# Patient Record
Sex: MALE | Race: BLACK OR AFRICAN AMERICAN | Employment: OTHER | URBAN - METROPOLITAN AREA
[De-identification: names, ages, dates, MRNs, and addresses within clinical notes are randomized per-mention and may not be internally consistent; named-entity substitution may affect disease eponyms.]

---

## 2018-10-09 ENCOUNTER — HOSPITAL ENCOUNTER (OUTPATIENT)
Dept: LAB | Age: 58
Discharge: HOME | End: 2018-10-09
Payer: MEDICARE

## 2018-10-09 ENCOUNTER — HOSPITAL ENCOUNTER (OUTPATIENT)
Dept: NON INVASIVE DIAGNOSTICS | Age: 58
Discharge: HOME | End: 2018-10-09
Payer: MEDICARE

## 2018-10-09 DIAGNOSIS — M20.12 ACQUIRED HALLUX VALGUS OF LEFT FOOT: ICD-10-CM

## 2018-10-09 LAB
ATRIAL RATE: 66 BPM
CALCULATED P AXIS, ECG09: 76 DEGREES
CALCULATED R AXIS, ECG10: 74 DEGREES
CALCULATED T AXIS, ECG11: 65 DEGREES
DIAGNOSIS, 93000: NORMAL
HCT VFR BLD AUTO: 43.6 % (ref 36–48)
HGB BLD-MCNC: 14.1 G/DL (ref 13–16)
P-R INTERVAL, ECG05: 154 MS
POTASSIUM SERPL-SCNC: 4.7 MMOL/L (ref 3.5–5.5)
Q-T INTERVAL, ECG07: 368 MS
QRS DURATION, ECG06: 88 MS
QTC CALCULATION (BEZET), ECG08: 385 MS
VENTRICULAR RATE, ECG03: 66 BPM

## 2018-10-09 PROCEDURE — 93005 ELECTROCARDIOGRAM TRACING: CPT

## 2018-10-09 PROCEDURE — 36415 COLL VENOUS BLD VENIPUNCTURE: CPT | Performed by: PODIATRIST

## 2018-10-09 PROCEDURE — 85018 HEMOGLOBIN: CPT | Performed by: PODIATRIST

## 2018-10-09 PROCEDURE — 84132 ASSAY OF SERUM POTASSIUM: CPT | Performed by: PODIATRIST

## 2018-10-10 LAB
FAX TO INFO,FAXT: NORMAL
FAX TO NUMBER,FAXN: NORMAL

## 2018-10-23 RX ORDER — TRIAMCINOLONE ACETONIDE 1 MG/G
CREAM TOPICAL
COMMUNITY

## 2018-10-23 RX ORDER — OMEPRAZOLE 20 MG/1
20 CAPSULE, DELAYED RELEASE ORAL
Status: ON HOLD | COMMUNITY
End: 2019-02-04

## 2018-10-23 RX ORDER — FAMOTIDINE 20 MG/1
20 TABLET, FILM COATED ORAL DAILY
COMMUNITY

## 2018-10-23 RX ORDER — COLCHICINE 0.6 MG/1
0.6 TABLET ORAL DAILY
COMMUNITY

## 2018-10-29 ENCOUNTER — HOSPITAL ENCOUNTER (OUTPATIENT)
Dept: INTERVENTIONAL RADIOLOGY/VASCULAR | Age: 58
Discharge: HOME | End: 2018-10-29
Attending: PODIATRIST
Payer: MEDICARE

## 2018-10-29 DIAGNOSIS — M79.675 PAIN OF GREAT TOE, LEFT: ICD-10-CM

## 2018-10-29 LAB
ANION GAP SERPL CALC-SCNC: 12 MMOL/L (ref 3–18)
APTT PPP: 25.9 SEC (ref 23–36.4)
BASOPHILS # BLD: 0 K/UL (ref 0–0.1)
BASOPHILS NFR BLD: 0 % (ref 0–3)
BUN SERPL-MCNC: 11 MG/DL (ref 7–18)
BUN/CREAT SERPL: 11
CALCIUM SERPL-MCNC: 8.9 MG/DL (ref 8.5–10.1)
CHLORIDE SERPL-SCNC: 100 MMOL/L (ref 100–108)
CO2 SERPL-SCNC: 28 MMOL/L (ref 21–32)
CREAT SERPL-MCNC: 0.96 MG/DL (ref 0.6–1.3)
DIFFERENTIAL METHOD BLD: ABNORMAL
EOSINOPHIL # BLD: 0.2 K/UL (ref 0–0.4)
EOSINOPHIL NFR BLD: 2 % (ref 0–5)
ERYTHROCYTE [DISTWIDTH] IN BLOOD BY AUTOMATED COUNT: 13 % (ref 11.6–14.5)
GLUCOSE SERPL-MCNC: 80 MG/DL (ref 74–99)
HCT VFR BLD AUTO: 41.7 % (ref 36–48)
HGB BLD-MCNC: 13.1 G/DL (ref 13–16)
INR PPP: 1 (ref 0.8–1.2)
LYMPHOCYTES # BLD: 1.2 K/UL (ref 0.8–3.5)
LYMPHOCYTES NFR BLD: 10 % (ref 20–51)
MCH RBC QN AUTO: 27.6 PG (ref 24–34)
MCHC RBC AUTO-ENTMCNC: 31.4 G/DL (ref 31–37)
MCV RBC AUTO: 87.8 FL (ref 74–97)
MONOCYTES # BLD: 0.7 K/UL (ref 0–1)
MONOCYTES NFR BLD: 6 % (ref 2–9)
NEUTS BAND NFR BLD MANUAL: 9 % (ref 0–5)
NEUTS SEG # BLD: 8.7 K/UL (ref 1.8–8)
NEUTS SEG NFR BLD: 73 % (ref 42–75)
PLATELET # BLD AUTO: 353 K/UL (ref 135–420)
PLATELET COMMENTS,PCOM: ABNORMAL
PMV BLD AUTO: 9.5 FL (ref 9.2–11.8)
POTASSIUM SERPL-SCNC: 4.7 MMOL/L (ref 3.5–5.5)
PROTHROMBIN TIME: 12.4 SEC (ref 11.5–15.2)
RBC # BLD AUTO: 4.75 M/UL (ref 4.7–5.5)
RBC MORPH BLD: ABNORMAL
SODIUM SERPL-SCNC: 140 MMOL/L (ref 136–145)
WBC # BLD AUTO: 11.9 K/UL (ref 4.6–13.2)

## 2018-10-29 PROCEDURE — 85730 THROMBOPLASTIN TIME PARTIAL: CPT | Performed by: PODIATRIST

## 2018-10-29 PROCEDURE — 85610 PROTHROMBIN TIME: CPT | Performed by: PODIATRIST

## 2018-10-29 PROCEDURE — 36415 COLL VENOUS BLD VENIPUNCTURE: CPT | Performed by: PODIATRIST

## 2018-10-29 PROCEDURE — 74011250636 HC RX REV CODE- 250/636: Performed by: PODIATRIST

## 2018-10-29 PROCEDURE — C1751 CATH, INF, PER/CENT/MIDLINE: HCPCS

## 2018-10-29 PROCEDURE — 85025 COMPLETE CBC W/AUTO DIFF WBC: CPT | Performed by: PODIATRIST

## 2018-10-29 PROCEDURE — 80048 BASIC METABOLIC PNL TOTAL CA: CPT | Performed by: PODIATRIST

## 2018-10-29 RX ORDER — LIDOCAINE HYDROCHLORIDE 10 MG/ML
1-10 INJECTION, SOLUTION EPIDURAL; INFILTRATION; INTRACAUDAL; PERINEURAL
Status: COMPLETED | OUTPATIENT
Start: 2018-10-29 | End: 2018-10-29

## 2018-10-29 RX ORDER — HEPARIN SODIUM 200 [USP'U]/100ML
500 INJECTION, SOLUTION INTRAVENOUS
Status: COMPLETED | OUTPATIENT
Start: 2018-10-29 | End: 2018-10-29

## 2018-10-29 RX ORDER — HEPARIN SODIUM 200 [USP'U]/100ML
INJECTION, SOLUTION INTRAVENOUS
Status: DISPENSED
Start: 2018-10-29 | End: 2018-10-29

## 2018-10-29 RX ORDER — LIDOCAINE HYDROCHLORIDE 10 MG/ML
INJECTION, SOLUTION EPIDURAL; INFILTRATION; INTRACAUDAL; PERINEURAL
Status: DISPENSED
Start: 2018-10-29 | End: 2018-10-29

## 2018-10-29 RX ORDER — HEPARIN SODIUM (PORCINE) LOCK FLUSH IV SOLN 100 UNIT/ML 100 UNIT/ML
SOLUTION INTRAVENOUS
Status: DISPENSED
Start: 2018-10-29 | End: 2018-10-29

## 2018-10-29 RX ORDER — HEPARIN SODIUM (PORCINE) LOCK FLUSH IV SOLN 100 UNIT/ML 100 UNIT/ML
500 SOLUTION INTRAVENOUS
Status: COMPLETED | OUTPATIENT
Start: 2018-10-29 | End: 2018-10-29

## 2018-10-29 RX ADMIN — HEPARIN SODIUM 1000 UNITS: 200 INJECTION, SOLUTION INTRAVENOUS at 08:45

## 2018-10-29 RX ADMIN — HEPARIN SODIUM (PORCINE) LOCK FLUSH IV SOLN 100 UNIT/ML 500 UNITS: 100 SOLUTION at 08:48

## 2018-10-29 RX ADMIN — LIDOCAINE HYDROCHLORIDE 3 ML: 10 INJECTION, SOLUTION EPIDURAL; INFILTRATION; INTRACAUDAL; PERINEURAL at 08:45

## 2019-02-03 ENCOUNTER — HOSPITAL ENCOUNTER (EMERGENCY)
Age: 59
Discharge: HOME | DRG: 477 | End: 2019-02-03
Attending: EMERGENCY MEDICINE
Payer: MEDICARE

## 2019-02-03 VITALS
WEIGHT: 110 LBS | OXYGEN SATURATION: 100 % | TEMPERATURE: 98.9 F | HEART RATE: 115 BPM | RESPIRATION RATE: 18 BRPM | HEIGHT: 64 IN | SYSTOLIC BLOOD PRESSURE: 147 MMHG | DIASTOLIC BLOOD PRESSURE: 101 MMHG | BODY MASS INDEX: 18.78 KG/M2

## 2019-02-03 DIAGNOSIS — M79.675 PAIN OF TOE OF LEFT FOOT: Primary | ICD-10-CM

## 2019-02-03 PROCEDURE — 99283 EMERGENCY DEPT VISIT LOW MDM: CPT

## 2019-02-03 PROCEDURE — 74011250637 HC RX REV CODE- 250/637: Performed by: EMERGENCY MEDICINE

## 2019-02-03 RX ORDER — HYDROCODONE BITARTRATE AND ACETAMINOPHEN 5; 325 MG/1; MG/1
1 TABLET ORAL
Status: COMPLETED | OUTPATIENT
Start: 2019-02-03 | End: 2019-02-03

## 2019-02-03 RX ADMIN — HYDROCODONE BITARTRATE AND ACETAMINOPHEN 1 TABLET: 5; 325 TABLET ORAL at 15:48
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
## 2019-02-03 NOTE — DISCHARGE INSTRUCTIONS
Foot Pain: Care Instructions  Your Care Instructions  Foot injuries that cause pain and swelling are fairly common. Almost all sports or home repair projects can cause a misstep that ends up as foot pain. Normal wear and tear, especially as you get older, also can cause foot pain. Most minor foot injuries will heal on their own, and home treatment is usually all you need to do. If you have a severe injury, you may need tests and treatment. Follow-up care is a key part of your treatment and safety. Be sure to make and go to all appointments, and call your doctor if you are having problems. It's also a good idea to know your test results and keep a list of the medicines you take. How can you care for yourself at home? · Take pain medicines exactly as directed. ? If the doctor gave you a prescription medicine for pain, take it as prescribed. ? If you are not taking a prescription pain medicine, ask your doctor if you can take an over-the-counter medicine. · Rest and protect your foot. Take a break from any activity that may cause pain. · Put ice or a cold pack on your foot for 10 to 20 minutes at a time. Put a thin cloth between the ice and your skin. · Prop up the sore foot on a pillow when you ice it or anytime you sit or lie down during the next 3 days. Try to keep it above the level of your heart. This will help reduce swelling. · Your doctor may recommend that you wrap your foot with an elastic bandage. Keep your foot wrapped for as long as your doctor advises. · If your doctor recommends crutches, use them as directed. · Wear roomy footwear. · As soon as pain and swelling end, begin gentle exercises of your foot. Your doctor can tell you which exercises will help. When should you call for help? Call 911 anytime you think you may need emergency care.  For example, call if:    · Your foot turns pale, white, blue, or cold.    Call your doctor now or seek immediate medical care if:    · You cannot move or stand on your foot.     · Your foot looks twisted or out of its normal position.     · Your foot is not stable when you step down.     · You have signs of infection, such as:  ? Increased pain, swelling, warmth, or redness. ? Red streaks leading from the sore area. ? Pus draining from a place on your foot. ? A fever.     · Your foot is numb or tingly.    Watch closely for changes in your health, and be sure to contact your doctor if:    · You do not get better as expected.     · You have bruises from an injury that last longer than 2 weeks. Where can you learn more? Go to http://neil-yoon.info/. Enter B889 in the search box to learn more about \"Foot Pain: Care Instructions. \"  Current as of: September 20, 2018  Content Version: 11.9  © 9949-1314 [employer]. Care instructions adapted under license by [employer] (which disclaims liability or warranty for this information). If you have questions about a medical condition or this instruction, always ask your healthcare professional. [address] any warranty or liability for your use of this information.
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
## 2019-02-04 ENCOUNTER — HOSPITAL ENCOUNTER (INPATIENT)
Age: 59
LOS: 7 days | Discharge: HOME HEALTH | DRG: 477 | End: 2019-02-11
Attending: HOSPITALIST | Admitting: HOSPITALIST
Payer: MEDICARE

## 2019-02-04 ENCOUNTER — APPOINTMENT (OUTPATIENT)
Dept: INTERVENTIONAL RADIOLOGY/VASCULAR | Age: 59
DRG: 477 | End: 2019-02-04
Attending: HOSPITALIST
Payer: MEDICARE

## 2019-02-04 DIAGNOSIS — Z98.890 STATUS POST SURGERY: Primary | ICD-10-CM

## 2019-02-04 PROBLEM — L03.116 CELLULITIS OF LEFT FOOT: Status: ACTIVE | Noted: 2019-02-04

## 2019-02-04 PROBLEM — Z78.9 ALCOHOL USE: Status: ACTIVE | Noted: 2019-02-04

## 2019-02-04 PROBLEM — F10.20 ALCOHOLISM (HCC): Status: ACTIVE | Noted: 2019-02-04

## 2019-02-04 PROBLEM — E87.1 HYPONATREMIA: Status: ACTIVE | Noted: 2019-02-04

## 2019-02-04 PROBLEM — I10 HYPERTENSION: Status: ACTIVE | Noted: 2019-02-04

## 2019-02-04 PROBLEM — M10.9 GOUT: Status: ACTIVE | Noted: 2019-02-04

## 2019-02-04 PROBLEM — I73.9 PAD (PERIPHERAL ARTERY DISEASE) (HCC): Status: ACTIVE | Noted: 2019-02-04

## 2019-02-04 PROBLEM — F17.200 SMOKER: Status: ACTIVE | Noted: 2019-02-04

## 2019-02-04 LAB
ALBUMIN SERPL-MCNC: 3.6 G/DL (ref 3.4–5)
ALBUMIN/GLOB SERPL: 0.8 {RATIO} (ref 0.8–1.7)
ALP SERPL-CCNC: 171 U/L (ref 45–117)
ALT SERPL-CCNC: 18 U/L (ref 16–61)
AMPHET UR QL SCN: NEGATIVE
ANION GAP SERPL CALC-SCNC: 7 MMOL/L (ref 3–18)
AST SERPL-CCNC: 16 U/L (ref 15–37)
BARBITURATES UR QL SCN: NEGATIVE
BASOPHILS # BLD: 0 K/UL (ref 0–0.1)
BASOPHILS NFR BLD: 0 % (ref 0–2)
BENZODIAZ UR QL: NEGATIVE
BILIRUB SERPL-MCNC: 0.6 MG/DL (ref 0.2–1)
BUN SERPL-MCNC: 10 MG/DL (ref 7–18)
BUN/CREAT SERPL: 13 (ref 12–20)
CALCIUM SERPL-MCNC: 9.1 MG/DL (ref 8.5–10.1)
CANNABINOIDS UR QL SCN: NEGATIVE
CHLORIDE SERPL-SCNC: 98 MMOL/L (ref 100–108)
CO2 SERPL-SCNC: 28 MMOL/L (ref 21–32)
COCAINE UR QL SCN: POSITIVE
CREAT SERPL-MCNC: 0.8 MG/DL (ref 0.6–1.3)
DIFFERENTIAL METHOD BLD: ABNORMAL
EOSINOPHIL # BLD: 0.2 K/UL (ref 0–0.4)
EOSINOPHIL NFR BLD: 1 % (ref 0–5)
ERYTHROCYTE [DISTWIDTH] IN BLOOD BY AUTOMATED COUNT: 12.3 % (ref 11.6–14.5)
GLOBULIN SER CALC-MCNC: 4.4 G/DL (ref 2–4)
GLUCOSE SERPL-MCNC: 103 MG/DL (ref 74–99)
HCT VFR BLD AUTO: 40 % (ref 36–48)
HDSCOM,HDSCOM: ABNORMAL
HGB BLD-MCNC: 12.8 G/DL (ref 13–16)
INR PPP: 0.9 (ref 0.8–1.2)
LYMPHOCYTES # BLD: 0.8 K/UL (ref 0.9–3.6)
LYMPHOCYTES NFR BLD: 6 % (ref 21–52)
MAGNESIUM SERPL-MCNC: 2 MG/DL (ref 1.6–2.6)
MCH RBC QN AUTO: 28.1 PG (ref 24–34)
MCHC RBC AUTO-ENTMCNC: 32 G/DL (ref 31–37)
MCV RBC AUTO: 87.9 FL (ref 74–97)
METHADONE UR QL: NEGATIVE
MONOCYTES # BLD: 2 K/UL (ref 0.05–1.2)
MONOCYTES NFR BLD: 13 % (ref 3–10)
NEUTS SEG # BLD: 12 K/UL (ref 1.8–8)
NEUTS SEG NFR BLD: 80 % (ref 40–73)
OPIATES UR QL: POSITIVE
PCP UR QL: NEGATIVE
PLATELET # BLD AUTO: 351 K/UL (ref 135–420)
PMV BLD AUTO: 9.6 FL (ref 9.2–11.8)
POTASSIUM SERPL-SCNC: 4 MMOL/L (ref 3.5–5.5)
PROT SERPL-MCNC: 8 G/DL (ref 6.4–8.2)
PROTHROMBIN TIME: 12.3 SEC (ref 11.5–15.2)
RBC # BLD AUTO: 4.55 M/UL (ref 4.7–5.5)
SODIUM SERPL-SCNC: 133 MMOL/L (ref 136–145)
WBC # BLD AUTO: 15.1 K/UL (ref 4.6–13.2)

## 2019-02-04 PROCEDURE — 65270000029 HC RM PRIVATE

## 2019-02-04 PROCEDURE — C1751 CATH, INF, PER/CENT/MIDLINE: HCPCS

## 2019-02-04 PROCEDURE — 74011250637 HC RX REV CODE- 250/637: Performed by: HOSPITALIST

## 2019-02-04 PROCEDURE — 83735 ASSAY OF MAGNESIUM: CPT

## 2019-02-04 PROCEDURE — 74011250636 HC RX REV CODE- 250/636

## 2019-02-04 PROCEDURE — 85025 COMPLETE CBC W/AUTO DIFF WBC: CPT

## 2019-02-04 PROCEDURE — 87077 CULTURE AEROBIC IDENTIFY: CPT

## 2019-02-04 PROCEDURE — 36415 COLL VENOUS BLD VENIPUNCTURE: CPT

## 2019-02-04 PROCEDURE — 87186 SC STD MICRODIL/AGAR DIL: CPT

## 2019-02-04 PROCEDURE — 80053 COMPREHEN METABOLIC PANEL: CPT

## 2019-02-04 PROCEDURE — 02HV33Z INSERTION OF INFUSION DEVICE INTO SUPERIOR VENA CAVA, PERCUTANEOUS APPROACH: ICD-10-PCS | Performed by: RADIOLOGY

## 2019-02-04 PROCEDURE — 74011250636 HC RX REV CODE- 250/636: Performed by: HOSPITALIST

## 2019-02-04 PROCEDURE — 80307 DRUG TEST PRSMV CHEM ANLYZR: CPT

## 2019-02-04 PROCEDURE — 85610 PROTHROMBIN TIME: CPT

## 2019-02-04 PROCEDURE — 74011000258 HC RX REV CODE- 258: Performed by: HOSPITALIST

## 2019-02-04 PROCEDURE — 87070 CULTURE OTHR SPECIMN AEROBIC: CPT

## 2019-02-04 RX ORDER — AMLODIPINE BESYLATE 5 MG/1
10 TABLET ORAL DAILY
Status: DISCONTINUED | OUTPATIENT
Start: 2019-02-05 | End: 2019-02-11 | Stop reason: HOSPADM

## 2019-02-04 RX ORDER — HEPARIN SODIUM 5000 [USP'U]/ML
5000 INJECTION, SOLUTION INTRAVENOUS; SUBCUTANEOUS EVERY 8 HOURS
Status: DISCONTINUED | OUTPATIENT
Start: 2019-02-04 | End: 2019-02-11 | Stop reason: HOSPADM

## 2019-02-04 RX ORDER — HYDROCODONE BITARTRATE AND ACETAMINOPHEN 5; 325 MG/1; MG/1
1 TABLET ORAL
Status: DISCONTINUED | OUTPATIENT
Start: 2019-02-04 | End: 2019-02-11

## 2019-02-04 RX ORDER — ACETAMINOPHEN 325 MG/1
650 TABLET ORAL
Status: DISCONTINUED | OUTPATIENT
Start: 2019-02-04 | End: 2019-02-11 | Stop reason: HOSPADM

## 2019-02-04 RX ORDER — TRIAMCINOLONE ACETONIDE 1 MG/G
CREAM TOPICAL 2 TIMES DAILY
Status: DISCONTINUED | OUTPATIENT
Start: 2019-02-04 | End: 2019-02-11 | Stop reason: HOSPADM

## 2019-02-04 RX ORDER — ONDANSETRON 2 MG/ML
4 INJECTION INTRAMUSCULAR; INTRAVENOUS
Status: DISCONTINUED | OUTPATIENT
Start: 2019-02-04 | End: 2019-02-11 | Stop reason: HOSPADM

## 2019-02-04 RX ORDER — ASPIRIN 325 MG/1
100 TABLET, FILM COATED ORAL DAILY
Status: DISCONTINUED | OUTPATIENT
Start: 2019-02-05 | End: 2019-02-11 | Stop reason: HOSPADM

## 2019-02-04 RX ORDER — KETOROLAC TROMETHAMINE 30 MG/ML
15 INJECTION, SOLUTION INTRAMUSCULAR; INTRAVENOUS
Status: DISPENSED | OUTPATIENT
Start: 2019-02-04 | End: 2019-02-09

## 2019-02-04 RX ORDER — MORPHINE SULFATE 2 MG/ML
1 INJECTION, SOLUTION INTRAMUSCULAR; INTRAVENOUS
Status: DISCONTINUED | OUTPATIENT
Start: 2019-02-04 | End: 2019-02-05

## 2019-02-04 RX ORDER — HEPARIN SODIUM 200 [USP'U]/100ML
500 INJECTION, SOLUTION INTRAVENOUS
Status: COMPLETED | OUTPATIENT
Start: 2019-02-04 | End: 2019-02-04

## 2019-02-04 RX ORDER — HEPARIN SODIUM (PORCINE) LOCK FLUSH IV SOLN 100 UNIT/ML 100 UNIT/ML
SOLUTION INTRAVENOUS
Status: COMPLETED
Start: 2019-02-04 | End: 2019-02-04

## 2019-02-04 RX ORDER — SODIUM CHLORIDE 0.9 % (FLUSH) 0.9 %
5-40 SYRINGE (ML) INJECTION EVERY 8 HOURS
Status: DISCONTINUED | OUTPATIENT
Start: 2019-02-04 | End: 2019-02-08

## 2019-02-04 RX ORDER — SODIUM CHLORIDE 0.9 % (FLUSH) 0.9 %
5-40 SYRINGE (ML) INJECTION AS NEEDED
Status: DISCONTINUED | OUTPATIENT
Start: 2019-02-04 | End: 2019-02-11 | Stop reason: HOSPADM

## 2019-02-04 RX ORDER — LISINOPRIL 20 MG/1
40 TABLET ORAL DAILY
Status: DISCONTINUED | OUTPATIENT
Start: 2019-02-04 | End: 2019-02-11 | Stop reason: HOSPADM

## 2019-02-04 RX ORDER — SODIUM CHLORIDE 9 MG/ML
75 INJECTION, SOLUTION INTRAVENOUS CONTINUOUS
Status: DISCONTINUED | OUTPATIENT
Start: 2019-02-04 | End: 2019-02-09

## 2019-02-04 RX ORDER — LIDOCAINE HYDROCHLORIDE 10 MG/ML
INJECTION, SOLUTION EPIDURAL; INFILTRATION; INTRACAUDAL; PERINEURAL
Status: COMPLETED
Start: 2019-02-04 | End: 2019-02-04

## 2019-02-04 RX ORDER — OMEPRAZOLE 20 MG/1
20 CAPSULE, DELAYED RELEASE ORAL DAILY
Status: DISCONTINUED | OUTPATIENT
Start: 2019-02-05 | End: 2019-02-11 | Stop reason: HOSPADM

## 2019-02-04 RX ORDER — FOLIC ACID 1 MG/1
1 TABLET ORAL DAILY
Status: DISCONTINUED | OUTPATIENT
Start: 2019-02-05 | End: 2019-02-11 | Stop reason: HOSPADM

## 2019-02-04 RX ORDER — COLCHICINE 0.6 MG/1
0.6 TABLET ORAL DAILY
Status: DISCONTINUED | OUTPATIENT
Start: 2019-02-05 | End: 2019-02-11 | Stop reason: HOSPADM

## 2019-02-04 RX ORDER — HEPARIN SODIUM (PORCINE) LOCK FLUSH IV SOLN 100 UNIT/ML 100 UNIT/ML
500 SOLUTION INTRAVENOUS
Status: DISCONTINUED | OUTPATIENT
Start: 2019-02-04 | End: 2019-02-11 | Stop reason: HOSPADM

## 2019-02-04 RX ORDER — HEPARIN SODIUM 200 [USP'U]/100ML
INJECTION, SOLUTION INTRAVENOUS
Status: COMPLETED
Start: 2019-02-04 | End: 2019-02-04

## 2019-02-04 RX ORDER — IBUPROFEN 200 MG
1 TABLET ORAL EVERY 24 HOURS
Status: DISCONTINUED | OUTPATIENT
Start: 2019-02-04 | End: 2019-02-11 | Stop reason: HOSPADM

## 2019-02-04 RX ORDER — CLONIDINE HYDROCHLORIDE 0.1 MG/1
0.1 TABLET ORAL
Status: COMPLETED | OUTPATIENT
Start: 2019-02-04 | End: 2019-02-04

## 2019-02-04 RX ORDER — LORAZEPAM 2 MG/ML
3 INJECTION INTRAMUSCULAR
Status: DISCONTINUED | OUTPATIENT
Start: 2019-02-04 | End: 2019-02-11 | Stop reason: HOSPADM

## 2019-02-04 RX ORDER — LORAZEPAM 1 MG/1
1 TABLET ORAL
Status: DISCONTINUED | OUTPATIENT
Start: 2019-02-04 | End: 2019-02-11 | Stop reason: HOSPADM

## 2019-02-04 RX ORDER — LORAZEPAM 2 MG/ML
1 INJECTION INTRAMUSCULAR
Status: DISCONTINUED | OUTPATIENT
Start: 2019-02-04 | End: 2019-02-11 | Stop reason: HOSPADM

## 2019-02-04 RX ORDER — DOCUSATE SODIUM 100 MG/1
100 CAPSULE, LIQUID FILLED ORAL 2 TIMES DAILY
Status: DISCONTINUED | OUTPATIENT
Start: 2019-02-04 | End: 2019-02-11 | Stop reason: HOSPADM

## 2019-02-04 RX ORDER — LIDOCAINE HYDROCHLORIDE 10 MG/ML
1-5 INJECTION, SOLUTION EPIDURAL; INFILTRATION; INTRACAUDAL; PERINEURAL
Status: COMPLETED | OUTPATIENT
Start: 2019-02-04 | End: 2019-02-04

## 2019-02-04 RX ORDER — DIPHENHYDRAMINE HYDROCHLORIDE 50 MG/ML
12.5 INJECTION, SOLUTION INTRAMUSCULAR; INTRAVENOUS
Status: DISCONTINUED | OUTPATIENT
Start: 2019-02-04 | End: 2019-02-11 | Stop reason: HOSPADM

## 2019-02-04 RX ORDER — LORAZEPAM 1 MG/1
2 TABLET ORAL
Status: DISCONTINUED | OUTPATIENT
Start: 2019-02-04 | End: 2019-02-11 | Stop reason: HOSPADM

## 2019-02-04 RX ORDER — LORAZEPAM 2 MG/ML
2 INJECTION INTRAMUSCULAR
Status: DISCONTINUED | OUTPATIENT
Start: 2019-02-04 | End: 2019-02-11 | Stop reason: HOSPADM

## 2019-02-04 RX ORDER — NALOXONE HYDROCHLORIDE 0.4 MG/ML
0.4 INJECTION, SOLUTION INTRAMUSCULAR; INTRAVENOUS; SUBCUTANEOUS AS NEEDED
Status: DISCONTINUED | OUTPATIENT
Start: 2019-02-04 | End: 2019-02-11 | Stop reason: HOSPADM

## 2019-02-04 RX ADMIN — HEPARIN SODIUM 1000 UNITS: 200 INJECTION, SOLUTION INTRAVENOUS at 14:26

## 2019-02-04 RX ADMIN — LIDOCAINE HYDROCHLORIDE 2 ML: 10 INJECTION, SOLUTION EPIDURAL; INFILTRATION; INTRACAUDAL; PERINEURAL at 14:26

## 2019-02-04 RX ADMIN — PIPERACILLIN SODIUM,TAZOBACTAM SODIUM 3.38 G: 3; .375 INJECTION, POWDER, FOR SOLUTION INTRAVENOUS at 17:30

## 2019-02-04 RX ADMIN — MORPHINE SULFATE 1 MG: 2 INJECTION, SOLUTION INTRAMUSCULAR; INTRAVENOUS at 12:30

## 2019-02-04 RX ADMIN — HEPARIN SODIUM (PORCINE) LOCK FLUSH IV SOLN 100 UNIT/ML 500 UNITS: 100 SOLUTION at 14:37

## 2019-02-04 RX ADMIN — HYDROCODONE BITARTRATE AND ACETAMINOPHEN 1 TABLET: 5; 325 TABLET ORAL at 15:05

## 2019-02-04 RX ADMIN — PANCRELIPASE LIPASE, PANCRELIPASE PROTEASE, PANCRELIPASE AMYLASE 1 CAPSULE: 10000; 32000; 42000 CAPSULE, DELAYED RELEASE ORAL at 21:12

## 2019-02-04 RX ADMIN — PIPERACILLIN SODIUM,TAZOBACTAM SODIUM 3.38 G: 3; .375 INJECTION, POWDER, FOR SOLUTION INTRAVENOUS at 12:30

## 2019-02-04 RX ADMIN — HEPARIN SODIUM 5000 UNITS: 5000 INJECTION INTRAVENOUS; SUBCUTANEOUS at 19:54

## 2019-02-04 RX ADMIN — TRIAMCINOLONE ACETONIDE: 1 CREAM TOPICAL at 21:12

## 2019-02-04 RX ADMIN — MORPHINE SULFATE 1 MG: 2 INJECTION, SOLUTION INTRAMUSCULAR; INTRAVENOUS at 17:30

## 2019-02-04 RX ADMIN — CLONIDINE HYDROCHLORIDE 0.1 MG: 0.1 TABLET ORAL at 15:27

## 2019-02-04 RX ADMIN — VANCOMYCIN HYDROCHLORIDE 1000 MG: 10 INJECTION, POWDER, LYOPHILIZED, FOR SOLUTION INTRAVENOUS at 13:12

## 2019-02-04 RX ADMIN — SODIUM CHLORIDE 75 ML/HR: 900 INJECTION, SOLUTION INTRAVENOUS at 13:09

## 2019-02-04 RX ADMIN — LISINOPRIL 40 MG: 20 TABLET ORAL at 13:41

## 2019-02-04 RX ADMIN — SODIUM CHLORIDE 75 ML/HR: 900 INJECTION, SOLUTION INTRAVENOUS at 21:10

## 2019-02-04 RX ADMIN — MORPHINE SULFATE 1 MG: 2 INJECTION, SOLUTION INTRAMUSCULAR; INTRAVENOUS at 21:11

## 2019-02-04 RX ADMIN — KETOROLAC TROMETHAMINE 15 MG: 30 INJECTION, SOLUTION INTRAMUSCULAR at 21:11

## 2019-02-04 NOTE — CONSULTS
Consult Note     This is a 62y.o. year old  Male whois my patient being admitted for failure of implant following great toe joint fusion as well as developing cellulitis and non healing. He has a history of PVD and was cleared for surgery by [facility] vascular. He did well initially but is now starting to deteriorate around the incision. He requires explantation of the hardware and bone biopsy but I also would like to have another vascular eval by Dr Negrita Leone. We have attempted out patient surgery in the past but IV access was too difficult. Subjective:  Past Medical History:   Diagnosis Date    Gout     Hypertension     Pancreatitis chronic      Past Surgical History:   Procedure Laterality Date    HX CHOLECYSTECTOMY      VASCULAR SURGERY PROCEDURE UNLIST      vascular     Social History     Socioeconomic History    Marital status:      Spouse name: Not on file    Number of children: Not on file    Years of education: Not on file    Highest education level: Not on file   Social Needs    Financial resource strain: Not on file    Food insecurity - worry: Not on file    Food insecurity - inability: Not on file   [place] [employer] needs - medical: Not on file   [employer] needs - non-medical: Not on file   Occupational History    Not on file   Tobacco Use    Smoking status: Current Every Day Smoker     Packs/day: 0.50     Years: 20.00     Pack years: 10.00    Smokeless tobacco: Never Used    Tobacco comment: using chantix   Substance and Sexual Activity    Alcohol use:  Yes     Alcohol/week: 1.2 oz     Types: 2 Cans of beer per week    Drug use: No    Sexual activity: Not on file   Other Topics Concern     Service Not Asked    Blood Transfusions Not Asked    Caffeine Concern Not Asked    Occupational Exposure Not Asked    Hobby Hazards Not Asked    Sleep Concern Not Asked    Stress Concern Not Asked    Weight Concern Not Asked    Special Diet Not Asked  Back Care Not Asked    Exercise Not Asked    Bike Helmet Not Asked   [address] Not Asked    Self-Exams Not Asked   Social History Narrative    Not on file     No family history on file.       Objective:  Lower Extremity Physical Exam    Visit Vitals  BP (!) 160/92   Pulse 85   Temp 97.7 °F (36.5 °C)   Resp 16   SpO2 100%       Exam:  Edema and erythema around the great toe fusion site  Skin breakdown and pain  Weak pedal pulses    Assessment:  PVD  Cellulitis of the great toe  Implant failure    Plan  Vascular consult  Explantation of hardware  IV abx    Osito Sanabria DPM  February 4, 2019  5:02 PM

## 2019-02-04 NOTE — PROCEDURES
Vascular & Interventional Radiology Brief Procedure Note    Interventional Radiologist: Jet Fuchs MD    Pre-operative Diagnosis:  Long term venous access     Post-operative Diagnosis: Same as pre-op dx    Procedure(s) Performed:  Ultrasound/fluoroscopic guided PICC line placement    Anesthesia:  Local      Findings:  Successful left arm dual lumen 5F PICC line placement. Ready for immediate use.       Complications: None    Estimated Blood Loss:  minimal    Tubes and Drains: as above    Specimens: None    Condition: Stable       Jet Fuchs MD  [facility]  Vascular & Interventional Radiology  2/4/2019
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
## 2019-02-04 NOTE — PROGRESS NOTES
Problem: Falls - Risk of 
Goal: *Absence of Falls Document [place]by Rank Fall Risk and appropriate interventions in the flowsheet. Outcome: Progressing Towards Goal 
Fall Risk Interventions: 
Mobility Interventions: Assess mobility with egress test 
 
  
 
Medication Interventions: Patient to call before getting OOB Elimination Interventions: Bed/chair exit alarm History of Falls Interventions: Bed/chair exit alarm

## 2019-02-04 NOTE — H&P (VIEW-ONLY)
Consult Note This is a 62y.o. year old  Male whois my patient being admitted for failure of implant following great toe joint fusion as well as developing cellulitis and non healing. He has a history of PVD and was cleared for surgery by [facility] vascular. He did well initially but is now starting to deteriorate around the incision. He requires explantation of the hardware and bone biopsy but I also would like to have another vascular eval by Dr Lauren Bush. We have attempted out patient surgery in the past but IV access was too difficult. Subjective: 
Past Medical History:  
Diagnosis Date  Gout  Hypertension  Pancreatitis chronic Past Surgical History:  
Procedure Laterality Date  HX CHOLECYSTECTOMY  VASCULAR SURGERY PROCEDURE UNLIST    
 vascular Social History Socioeconomic History  Marital status:  Spouse name: Not on file  Number of children: Not on file  Years of education: Not on file  Highest education level: Not on file Social Needs  Financial resource strain: Not on file  Food insecurity - worry: Not on file  Food insecurity - inability: Not on file  Transportation needs - medical: Not on file  Transportation needs - non-medical: Not on file Occupational History  Not on file Tobacco Use  Smoking status: Current Every Day Smoker Packs/day: 0.50 Years: 20.00 Pack years: 10.00  Smokeless tobacco: Never Used  Tobacco comment: using chantix Substance and Sexual Activity  Alcohol use: Yes Alcohol/week: 1.2 oz Types: 2 Cans of beer per week  Drug use: No  
 Sexual activity: Not on file Other Topics Concern [address] Service Not Asked  Blood Transfusions Not Asked  Caffeine Concern Not Asked  Occupational Exposure Not Asked [place] Hazards Not Asked  Sleep Concern Not Asked  Stress Concern Not Asked  Weight Concern Not Asked  Special Diet Not Asked  Back Care Not Asked  Exercise Not Asked  Bike Helmet Not Asked  Seat Belt Not Asked  Self-Exams Not Asked Social History Narrative  Not on file No family history on file. Objective: 
Lower Extremity Physical Exam 
 
Visit Vitals BP (!) 160/92 Pulse 85 Temp 97.7 °F (36.5 °C) Resp 16 SpO2 100% Exam: 
Edema and erythema around the great toe fusion site Skin breakdown and pain Weak pedal pulses Assessment: PVD Cellulitis of the great toe Implant failure Plan Vascular consult Explantation of hardware IV abx Gerson Douglas DPM 
February 4, 2019 
5:02 PM

## 2019-02-04 NOTE — ROUTINE PROCESS
Called Kalina Vigil (medic) for IV site. Pt has had PICC lines in the past d/t being a hard stick. [address], spoke to Ms. Carvajal regarding STAT lab draw  
 
[phone] - Spoke to Ivan Tovar from lab, states already chiquita STAT labs as per order 65 - Spoke to MD regarding high BP even after administration of lisinopril (scheduled and pain medication). MD states will place orders

## 2019-02-04 NOTE — PROGRESS NOTES
Pharmacy Dosing Services: Vancomycin Consult for Vancomycin Dosing by Pharmacy by Dr. Juan Bear provided for this 62y.o. year old male , for indication of SSTI. Day of Therapy 01 Ht Readings from Last 1 Encounters:  
02/03/19 162.6 cm (64\") Wt Readings from Last 1 Encounters:  
02/03/19 49.9 kg (110 lb) Previous Regimen NA Last Level NA Other Current Antibiotics Zosyn 3.375 gm IV q6 Significant Cultures Pending Serum Creatinine Lab Results Component Value Date/Time Creatinine 0.80 02/04/2019 12:17 PM  
  
Creatinine Clearance Estimated Creatinine Clearance: 68.4 mL/min (based on SCr of 0.8 mg/dL). BUN Lab Results Component Value Date/Time BUN 10 02/04/2019 12:17 PM  
  
WBC Lab Results Component Value Date/Time WBC 15.1 (H) 02/04/2019 12:17 PM  
  
H/H Lab Results Component Value Date/Time HGB 12.8 (L) 02/04/2019 12:17 PM  
  
Platelets Lab Results Component Value Date/Time PLATELET 204 47/18/6015 12:17 PM  
  
Temp 98.5 °F (36.9 °C) Start Vancomycin therapy, with loading dose of Vancomycin 1 gm IV x1 @ 13:00 2/4/19 Follow with maintenance dose of 750 mg IV q12, beginning @ 01:00 2/5/19 Dose calculated to approximate a therapeutic trough of 10-15 mcg/mL. Pharmacy to follow daily and will make changes to dose and/or frequency based on clinical status. Safia Paez, Pharm. D. Clinical Pharmacist 
[phone]

## 2019-02-04 NOTE — H&P
The patient is an appropriate candidate to undergo PICC. Patient assessed immediately prior to induction. Anesthesia plan as follows: Local/No Anesthesia. History and Physical update:  H&P was reviewed and the patient was examined. No changes have occurred in the patient's condition since the H&P was completed. Levi Fierro MD 
Vascular & Interventional Radiology [facility] 2/4/2019

## 2019-02-05 ENCOUNTER — APPOINTMENT (OUTPATIENT)
Dept: VASCULAR SURGERY | Age: 59
DRG: 477 | End: 2019-02-05
Attending: SURGERY
Payer: MEDICARE

## 2019-02-05 PROBLEM — F14.10 COCAINE ABUSE (HCC): Status: ACTIVE | Noted: 2019-02-05

## 2019-02-05 LAB
ALBUMIN SERPL-MCNC: 3.1 G/DL (ref 3.4–5)
ALBUMIN/GLOB SERPL: 0.9 {RATIO} (ref 0.8–1.7)
ALP SERPL-CCNC: 151 U/L (ref 45–117)
ALT SERPL-CCNC: 15 U/L (ref 16–61)
ANION GAP SERPL CALC-SCNC: 7 MMOL/L (ref 3–18)
AST SERPL-CCNC: 15 U/L (ref 15–37)
BASOPHILS # BLD: 0 K/UL (ref 0–0.1)
BASOPHILS NFR BLD: 0 % (ref 0–2)
BILIRUB SERPL-MCNC: 0.7 MG/DL (ref 0.2–1)
BUN SERPL-MCNC: 11 MG/DL (ref 7–18)
BUN/CREAT SERPL: 13 (ref 12–20)
CALCIUM SERPL-MCNC: 8.8 MG/DL (ref 8.5–10.1)
CHLORIDE SERPL-SCNC: 104 MMOL/L (ref 100–108)
CO2 SERPL-SCNC: 26 MMOL/L (ref 21–32)
CREAT SERPL-MCNC: 0.86 MG/DL (ref 0.6–1.3)
DIFFERENTIAL METHOD BLD: ABNORMAL
EOSINOPHIL # BLD: 0.3 K/UL (ref 0–0.4)
EOSINOPHIL NFR BLD: 4 % (ref 0–5)
ERYTHROCYTE [DISTWIDTH] IN BLOOD BY AUTOMATED COUNT: 12.4 % (ref 11.6–14.5)
GLOBULIN SER CALC-MCNC: 3.6 G/DL (ref 2–4)
GLUCOSE SERPL-MCNC: 77 MG/DL (ref 74–99)
HCT VFR BLD AUTO: 39 % (ref 36–48)
HGB BLD-MCNC: 12.2 G/DL (ref 13–16)
LEFT ABI: 0.56
LEFT ANTERIOR TIBIAL: 80 MMHG
LEFT ARM BP: 144 MMHG
LEFT POSTERIOR TIBIAL: 44 MMHG
LYMPHOCYTES # BLD: 1.2 K/UL (ref 0.9–3.6)
LYMPHOCYTES NFR BLD: 12 % (ref 21–52)
MAGNESIUM SERPL-MCNC: 1.8 MG/DL (ref 1.6–2.6)
MCH RBC QN AUTO: 27.5 PG (ref 24–34)
MCHC RBC AUTO-ENTMCNC: 31.3 G/DL (ref 31–37)
MCV RBC AUTO: 88 FL (ref 74–97)
MONOCYTES # BLD: 1.4 K/UL (ref 0.05–1.2)
MONOCYTES NFR BLD: 15 % (ref 3–10)
NEUTS SEG # BLD: 6.6 K/UL (ref 1.8–8)
NEUTS SEG NFR BLD: 69 % (ref 40–73)
PLATELET # BLD AUTO: 321 K/UL (ref 135–420)
PMV BLD AUTO: 9.8 FL (ref 9.2–11.8)
POTASSIUM SERPL-SCNC: 4.1 MMOL/L (ref 3.5–5.5)
PROT SERPL-MCNC: 6.7 G/DL (ref 6.4–8.2)
RBC # BLD AUTO: 4.43 M/UL (ref 4.7–5.5)
RIGHT ABI: 0.92
RIGHT ANTERIOR TIBIAL: 133 MMHG
RIGHT ARM BP: 140 MMHG
RIGHT POSTERIOR TIBIAL: 120 MMHG
RIGHT TBI: 0.97
RIGHT TOE PRESSURE: 140 MMHG
SODIUM SERPL-SCNC: 137 MMOL/L (ref 136–145)
WBC # BLD AUTO: 9.4 K/UL (ref 4.6–13.2)

## 2019-02-05 PROCEDURE — 80053 COMPREHEN METABOLIC PANEL: CPT

## 2019-02-05 PROCEDURE — 93922 UPR/L XTREMITY ART 2 LEVELS: CPT

## 2019-02-05 PROCEDURE — 74011250636 HC RX REV CODE- 250/636: Performed by: HOSPITALIST

## 2019-02-05 PROCEDURE — 83735 ASSAY OF MAGNESIUM: CPT

## 2019-02-05 PROCEDURE — 97116 GAIT TRAINING THERAPY: CPT

## 2019-02-05 PROCEDURE — 74011250636 HC RX REV CODE- 250/636: Performed by: INTERNAL MEDICINE

## 2019-02-05 PROCEDURE — 74011250637 HC RX REV CODE- 250/637: Performed by: HOSPITALIST

## 2019-02-05 PROCEDURE — 36415 COLL VENOUS BLD VENIPUNCTURE: CPT

## 2019-02-05 PROCEDURE — 77030020887 HC CRM SKN PROT DIMTH S&N -A

## 2019-02-05 PROCEDURE — 97165 OT EVAL LOW COMPLEX 30 MIN: CPT

## 2019-02-05 PROCEDURE — 97535 SELF CARE MNGMENT TRAINING: CPT

## 2019-02-05 PROCEDURE — 93926 LOWER EXTREMITY STUDY: CPT

## 2019-02-05 PROCEDURE — 97161 PT EVAL LOW COMPLEX 20 MIN: CPT

## 2019-02-05 PROCEDURE — 65270000029 HC RM PRIVATE

## 2019-02-05 PROCEDURE — 85025 COMPLETE CBC W/AUTO DIFF WBC: CPT

## 2019-02-05 PROCEDURE — 74011000258 HC RX REV CODE- 258: Performed by: HOSPITALIST

## 2019-02-05 RX ORDER — CEFAZOLIN SODIUM 2 G/50ML
2 SOLUTION INTRAVENOUS ONCE
Status: CANCELLED | OUTPATIENT
Start: 2019-02-06 | End: 2019-02-07

## 2019-02-05 RX ORDER — HYDRALAZINE HYDROCHLORIDE 20 MG/ML
10 INJECTION INTRAMUSCULAR; INTRAVENOUS
Status: DISCONTINUED | OUTPATIENT
Start: 2019-02-05 | End: 2019-02-11 | Stop reason: HOSPADM

## 2019-02-05 RX ORDER — HYDROMORPHONE HYDROCHLORIDE 1 MG/ML
1 INJECTION, SOLUTION INTRAMUSCULAR; INTRAVENOUS; SUBCUTANEOUS
Status: DISCONTINUED | OUTPATIENT
Start: 2019-02-05 | End: 2019-02-06

## 2019-02-05 RX ADMIN — PIPERACILLIN SODIUM,TAZOBACTAM SODIUM 3.38 G: 3; .375 INJECTION, POWDER, FOR SOLUTION INTRAVENOUS at 11:53

## 2019-02-05 RX ADMIN — HYDROMORPHONE HYDROCHLORIDE 1 MG: 1 INJECTION, SOLUTION INTRAMUSCULAR; INTRAVENOUS; SUBCUTANEOUS at 10:28

## 2019-02-05 RX ADMIN — HYDROMORPHONE HYDROCHLORIDE 1 MG: 1 INJECTION, SOLUTION INTRAMUSCULAR; INTRAVENOUS; SUBCUTANEOUS at 22:06

## 2019-02-05 RX ADMIN — Medication 100 MG: at 09:15

## 2019-02-05 RX ADMIN — Medication 10 ML: at 06:41

## 2019-02-05 RX ADMIN — PANCRELIPASE LIPASE, PANCRELIPASE PROTEASE, PANCRELIPASE AMYLASE 1 CAPSULE: 10000; 32000; 42000 CAPSULE, DELAYED RELEASE ORAL at 22:06

## 2019-02-05 RX ADMIN — HYDROMORPHONE HYDROCHLORIDE 1 MG: 1 INJECTION, SOLUTION INTRAMUSCULAR; INTRAVENOUS; SUBCUTANEOUS at 07:58

## 2019-02-05 RX ADMIN — HYDROMORPHONE HYDROCHLORIDE 1 MG: 1 INJECTION, SOLUTION INTRAMUSCULAR; INTRAVENOUS; SUBCUTANEOUS at 03:51

## 2019-02-05 RX ADMIN — AMLODIPINE BESYLATE 10 MG: 5 TABLET ORAL at 09:15

## 2019-02-05 RX ADMIN — HYDROMORPHONE HYDROCHLORIDE 1 MG: 1 INJECTION, SOLUTION INTRAMUSCULAR; INTRAVENOUS; SUBCUTANEOUS at 13:04

## 2019-02-05 RX ADMIN — PIPERACILLIN SODIUM,TAZOBACTAM SODIUM 3.38 G: 3; .375 INJECTION, POWDER, FOR SOLUTION INTRAVENOUS at 00:45

## 2019-02-05 RX ADMIN — HYDROMORPHONE HYDROCHLORIDE 1 MG: 1 INJECTION, SOLUTION INTRAMUSCULAR; INTRAVENOUS; SUBCUTANEOUS at 17:20

## 2019-02-05 RX ADMIN — HEPARIN SODIUM 5000 UNITS: 5000 INJECTION INTRAVENOUS; SUBCUTANEOUS at 20:54

## 2019-02-05 RX ADMIN — COLCHICINE 0.6 MG: 0.6 TABLET, FILM COATED ORAL at 09:15

## 2019-02-05 RX ADMIN — PIPERACILLIN SODIUM,TAZOBACTAM SODIUM 3.38 G: 3; .375 INJECTION, POWDER, FOR SOLUTION INTRAVENOUS at 17:20

## 2019-02-05 RX ADMIN — FOLIC ACID 1 MG: 1 TABLET ORAL at 09:15

## 2019-02-05 RX ADMIN — HYDROCODONE BITARTRATE AND ACETAMINOPHEN 1 TABLET: 5; 325 TABLET ORAL at 09:15

## 2019-02-05 RX ADMIN — HYDROCODONE BITARTRATE AND ACETAMINOPHEN 1 TABLET: 5; 325 TABLET ORAL at 20:54

## 2019-02-05 RX ADMIN — HEPARIN SODIUM 5000 UNITS: 5000 INJECTION INTRAVENOUS; SUBCUTANEOUS at 11:53

## 2019-02-05 RX ADMIN — OMEPRAZOLE 20 MG: 20 CAPSULE, DELAYED RELEASE ORAL at 09:15

## 2019-02-05 RX ADMIN — HYDROMORPHONE HYDROCHLORIDE 1 MG: 1 INJECTION, SOLUTION INTRAMUSCULAR; INTRAVENOUS; SUBCUTANEOUS at 00:43

## 2019-02-05 RX ADMIN — VANCOMYCIN HYDROCHLORIDE 750 MG: 10 INJECTION, POWDER, LYOPHILIZED, FOR SOLUTION INTRAVENOUS at 01:40

## 2019-02-05 RX ADMIN — HEPARIN SODIUM 5000 UNITS: 5000 INJECTION INTRAVENOUS; SUBCUTANEOUS at 03:31

## 2019-02-05 RX ADMIN — HYDRALAZINE HYDROCHLORIDE 10 MG: 20 INJECTION INTRAMUSCULAR; INTRAVENOUS at 01:57

## 2019-02-05 RX ADMIN — PANCRELIPASE LIPASE, PANCRELIPASE PROTEASE, PANCRELIPASE AMYLASE 1 CAPSULE: 10000; 32000; 42000 CAPSULE, DELAYED RELEASE ORAL at 15:46

## 2019-02-05 RX ADMIN — VANCOMYCIN HYDROCHLORIDE 750 MG: 10 INJECTION, POWDER, LYOPHILIZED, FOR SOLUTION INTRAVENOUS at 12:53

## 2019-02-05 RX ADMIN — LISINOPRIL 40 MG: 20 TABLET ORAL at 07:58

## 2019-02-05 RX ADMIN — TRIAMCINOLONE ACETONIDE: 1 CREAM TOPICAL at 09:16

## 2019-02-05 RX ADMIN — SODIUM CHLORIDE 75 ML/HR: 900 INJECTION, SOLUTION INTRAVENOUS at 12:53

## 2019-02-05 RX ADMIN — KETOROLAC TROMETHAMINE 15 MG: 30 INJECTION, SOLUTION INTRAMUSCULAR at 03:30

## 2019-02-05 RX ADMIN — TRIAMCINOLONE ACETONIDE: 1 CREAM TOPICAL at 20:55

## 2019-02-05 RX ADMIN — PIPERACILLIN SODIUM,TAZOBACTAM SODIUM 3.38 G: 3; .375 INJECTION, POWDER, FOR SOLUTION INTRAVENOUS at 05:30

## 2019-02-05 RX ADMIN — HYDROCODONE BITARTRATE AND ACETAMINOPHEN 1 TABLET: 5; 325 TABLET ORAL at 15:46

## 2019-02-05 NOTE — PROGRESS NOTES
Problem: Falls - Risk of 
Goal: *Absence of Falls Document Sydni Levi Fall Risk and appropriate interventions in the flowsheet. Outcome: Progressing Towards Goal 
Fall Risk Interventions: 
Mobility Interventions: Communicate number of staff needed for ambulation/transfer, PT Consult for mobility concerns Medication Interventions: Patient to call before getting OOB, Teach patient to arise slowly Elimination Interventions: Call light in reach, Patient to call for help with toileting needs History of Falls Interventions: Door open when patient unattended Problem: Pressure Injury - Risk of 
Goal: *Prevention of pressure injury Document Michael Scale and appropriate interventions in the flowsheet. Pressure Injury Interventions: Activity Interventions: PT/OT evaluation Mobility Interventions: PT/OT evaluation Nutrition Interventions: Document food/fluid/supplement intake

## 2019-02-05 NOTE — PROGRESS NOTES
Problem: Pressure Injury - Risk of 
Goal: *Prevention of pressure injury Document Michael Scale and appropriate interventions in the flowsheet. Outcome: Progressing Towards Goal 
Pressure Injury Interventions: Activity Interventions: PT/OT evaluation Mobility Interventions: PT/OT evaluation Nutrition Interventions: Document food/fluid/supplement intake

## 2019-02-05 NOTE — PROGRESS NOTES
Call to [facility] RN, informed her of 1330 Left leg angio procedure tomorrow. Pt needs to be NPO and no hold of heparin SQ.

## 2019-02-05 NOTE — PROGRESS NOTES
Problem: Mobility Impaired (Adult and Pediatric) Goal: *Acute Goals and Plan of Care (Insert Text) Physical Therapy Goals Initiated 2/5/2019 and to be accomplished within 4 day(s) 1. Patient will move from supine <> sit with independence in prep for out of bed activity and change of position. 2.  Patient will perform sit<> stand with mod independence with LRAD in prep for transfers/ambulation. 3.  Patient will transfer from bed <> chair with mod independence with LRAD for time up in chair for completion of ADL activity. 4.  Patient will ambulate 150 feet with modified for improved functional mobility/safe discharge. 5.  Patient will ascend/descend 3-5 stairs with handrail(s) with minimal assistance/contact guard assist for home re-entry as needed. Outcome: Progressing Towards Goal 
physical Therapy EVALUATION Patient: Dhaval Obrien (62 y.o. male) Date: 2/5/2019 Primary Diagnosis: Cellulitis of left foot [L03.116] Precautions: Fall ASSESSMENT : 
Based on the objective data described below, the patient is a 62 y M seen on medical unit. Pt c/o pain 10/10 L foot and initially declining PT participation due to same, however, intermittently joking with writer as well. Pt with swelling L foot at area of great toe and incision/no dressing. Pt presents with independence in bed mobility, limitation in transfers to supervision and in gait quality with SPC/orthotic boot L foot (donned independently) required for gt 46ft with supervision. Pt with balance intact, decreased loretta, step to gt pattern with decreased stance time L LE. Dr Alyse Chavez in to see pt upon return to room. Patient reports pain 10/10 post treatment as well. Nurse Stiven Cesar notified of above and notes pt able to have pain meds again after session. Pt left supine in bed with all needs in reach. At this time, pt may not require physical therapy upon discharge, however, will update status following further intervention. Pt Education: pt educated in safety/technique during functional mobility tasks Patient will benefit from skilled intervention to address the above impairments. Patients rehabilitation potential is considered to be Good Factors which may influence rehabilitation potential include:  
[]         None noted 
[]         Mental ability/status []         Medical condition 
[]         Home/family situation and support systems 
[]         Safety awareness [x]         Pain tolerance/management 
[]         Other: PLAN : 
Recommendations and Planned Interventions: 
[x]           Bed Mobility Training             []    Neuromuscular Re-Education 
[x]           Transfer Training                   []    Orthotic/Prosthetic Training 
[x]           Gait Training                          []    Modalities [x]           Therapeutic Exercises          []    Edema Management/Control 
[x]           Therapeutic Activities            [x]    Patient and Family Training/Education 
[]           Other (comment): Frequency/Duration: Patient will be followed by physical therapy 1-2 times per day to address goals. Discharge Recommendations: To Be Determined Further Equipment Recommendations for Discharge: N/A  
 
SUBJECTIVE:  
Patient stated I need something for this pain.  OBJECTIVE DATA SUMMARY:  
 
Past Medical History:  
Diagnosis Date  Gout  Hypertension  Pancreatitis chronic Past Surgical History:  
Procedure Laterality Date  HX CHOLECYSTECTOMY  VASCULAR SURGERY PROCEDURE UNLIST    
 vascular Barriers to Learning/Limitations: None Compensate with: N/A Prior Level of Function/Home Situation: as noted above Home Situation Home Environment: Apartment # Steps to Enter: 4 Rails to Enter: Yes One/Two Story Residence: One story Living Alone: No 
Support Systems: Spouse/Significant Other/Partner Patient Expects to be Discharged to[de-identified] [facility] Current DME Used/Available at Home: Brace/Splint, Walker, rolling, Cane, straight(amb ) Tub or Shower Type: Tub/Shower combinationCritical Behavior: 
Neurologic State: Alert; Appropriate for age Orientation Level: Oriented X4 Cognition: Follows commands Psychosocial 
Patient Behaviors: Calm; Cooperative Purposeful Interaction: Yes Pt Identified Daily Priority: Clinical issues (comment) [employer] Process: Nurture loving kindness;Establish trust 
Caring Interventions: Reassure Reassure: Therapeutic listening Therapeutic Modalities: Humor; Intentional therapeutic touchSkin Condition/Temp: Dry;Flaky; Warm 
Skin Integrity: Incision (comment); Wound (add Wound LDA)(L great toe region w/o drsing) Skin Integumentary Skin Color: Appropriate for ethnicity Skin Condition/Temp: Dry;Flaky; Warm 
Skin Integrity: Incision (comment); Wound (add Wound LDA)(L great toe region w/o drsing) Turgor: Non-tenting Hair Growth: Absent Strength:   
Strength: Generally decreased, functional(LE's) Tone & Sensation:  
Tone: Normal 
Sensation: Intact Range Of Motion: 
AROM: Within functional limits(LE's) Functional Mobility: 
Bed Mobility: 
Supine to Sit: Independent Sit to Supine: Independent Scooting: Independent Transfers: 
Sit to Stand: Supervision Stand to Sit: Supervision Balance:  
Sitting: Intact Standing: Intact; With support(SPC)Ambulation/Gait Training: 
Distance (ft): 46 Feet (ft) Assistive Device: Gait belt;Brace/Splint;Cane, straight Ambulation - Level of Assistance: Supervision Gait Description (WDL): Exceptions to [facility] Gait Abnormalities: Antalgic;Decreased step clearance; Step to gait Left Side Weight Bearing: As tolerated Stance: Left decreased Speed/Becka: Pace decreased (<100 feet/min) Step Length: Right shortened;Left shortened Swing Pattern: Left asymmetrical;Right asymmetrical 
Interventions: Safety awareness training Pain: 
Pain Scale 1: Numeric (0 - 10) Pain Intensity 1: 10 
Pain Location 1: Foot Pain Orientation 1: Left Pain Description 1: (\"just pain\") Pain Intervention(s) 1: Nurse notified Activity Tolerance:  
Fair Please refer to the flowsheet for vital signs taken during this treatment. After treatment:  
[]         Patient left in no apparent distress sitting up in chair 
[x]         Patient left in no apparent distress in bed 
[x]         Call bell left within reach [x]         Nursing notified 
[]         Caregiver present 
[]         Bed alarm activated COMMUNICATION/EDUCATION:  
[x]         Fall prevention education was provided and the patient/caregiver indicated understanding. [x]         Patient/family have participated as able in goal setting and plan of care. [x]         Patient/family agree to work toward stated goals and plan of care. []         Patient understands intent and goals of therapy, but is neutral about his/her participation. []         Patient is unable to participate in goal setting and plan of care. Eval Complexity: History: HIGH Complexity :3+ comorbidities / personal factors will impact the outcome/ POC Exam:LOW Complexity : 1-2 Standardized tests and measures addressing body structure, function, activity limitation and / or participation in recreation  Presentation: LOW Complexity : Stable, uncomplicated  Clinical Decision Making:Low Complexity amb >30ft with SPC/S with ortho boot in place Overall Complexity:LOW Thank you for this referral. 
Marquise Alarcon, PT Time Calculation: 26 mins

## 2019-02-05 NOTE — PROGRESS NOTES
Problem: Falls - Risk of 
Goal: *Absence of Falls Document [place] Fall Risk and appropriate interventions in the flowsheet. Outcome: Progressing Towards Goal 
Fall Risk Interventions: 
Mobility Interventions: Assess mobility with egress test 
 
  
 
Medication Interventions: Patient to call before getting OOB Elimination Interventions: Call light in reach History of Falls Interventions: Door open when patient unattended

## 2019-02-05 NOTE — PROGRESS NOTES
Problem: Self Care Deficits Care Plan (Adult) Goal: *Acute Goals and Plan of Care (Insert Text) Outcome: Resolved/Met Date Met: 02/05/19 Occupational Therapy EVALUATION/discharge Patient: Rafaela Gordon (62 y.o. male) Date: 2/5/2019 Primary Diagnosis: Cellulitis of left foot [L03.116] Precautions:   Fall ASSESSMENT AND RECOMMENDATIONS: 
Based on the objective data described below, the patient presents with ability to perform ADL tasks at baseline level. Pt was sitting on EOB upon arrival; pleasant and cooperative. Pt was able to demo ability to complete his dressing tasks with no difficulty. Pt donned L boot and R shoe with setup. Pt completed in-room and bathroom mobility with his SPC and S/Mod I. Pt completing toileting tasks and grooming tasks while in bathroom with S/Mod I. Pt was left sitting on EOB with all needs met; call bell and table tray within reach. Pt overall is completing his self-care tasks with no difficulty or assistance. Pt was educated and verbalized understanding of Role of OT and in agreement of not needing skilled OT services. Pt with no further OT/ADL concerns at this time. Skilled occupational therapy is not indicated at this time. Education: Role of OT/POC, home safety Discharge Recommendations: none Further Equipment Recommendations for Discharge: none SUBJECTIVE:  
Patient stated I'm doing alright. I can take care of myself.  OBJECTIVE DATA SUMMARY:  
 
Past Medical History:  
Diagnosis Date  Gout  Hypertension  Pancreatitis chronic Past Surgical History:  
Procedure Laterality Date  HX CHOLECYSTECTOMY  VASCULAR SURGERY PROCEDURE UNLIST    
 vascular Barriers to Learning/Limitations: None Compensate with: visual, verbal, tactile, kinesthetic cues/model Prior Level of Function/Home Situation: Pt was Mod I with ADLs/IADLs prior. Pt used SPC for mobility Home Situation Home Environment: Apartment # Steps to Enter: 4 Rails to Enter: Yes One/Two Story Residence: One story Living Alone: No 
Support Systems: Spouse/Significant Other/Partner Patient Expects to be Discharged to[de-identified] [employer] Current DME Used/Available at Home: Brace/Splint, Walker, rolling, Cane, straight(amb ) Tub or Shower Type: Tub/Shower combination Cognitive/Behavioral Status: 
Neurologic State: Alert; Appropriate for age Orientation Level: Appropriate for age;Oriented X4 Cognition: Appropriate decision making; Appropriate for age attention/concentration; Appropriate safety awareness; Follows commands Safety/Judgement: Awareness of environment; Fall prevention;Good awareness of safety precautions; Home safety Vision/Perceptual:   
   Corrective Lenses: Glasses Coordination: 
Coordination: Within functional limits Fine Motor Skills-Upper: Left Intact; Right Intact Gross Motor Skills-Upper: Left Intact; Right Intact Balance: 
Sitting: Intact Standing: Intact; With support[place] Strength: BUE Strength: Within functional limits Tone & Sensation: BUE Tone: Normal 
Sensation: Intact Range of Motion: BUE 
AROM: Within functional limits PROM: Within functional limits Functional Mobility and Transfers for ADLs: 
Bed Mobility: 
 Supine to Sit: Independent Sit to Supine: Independent Scooting: Independent Transfers: 
Sit to Stand: Supervision;Modified independent ADL Assessment: 
Feeding: Independent Oral Facial Hygiene/Grooming: Supervision;Modified Independent Lower Body Dressing: Setup Toileting: Supervision;Modified independent ADL Intervention: 
Feeding Feeding Assistance: Independent Container Management: Independent Cutting Food: Independent Utensil Management: Independent Food to Mouth: Independent Drink to Mouth: Independent Grooming Grooming Assistance: Supervision/set up;Modified independent Washing Face: Supervision/set-up; Modified independent Washing Hands: Supervision/set-up; Modified independent Brushing Teeth: Supervision/set-up; Modified independent Lower Body Dressing Assistance Shoes with Cloth Laces: Supervision/set-up Position Performed: Bending forward method;Seated edge of bed Toileting Toileting Assistance: Supervision/set up;Modified independent Clothing Management: Supervision/set-up Cognitive Retraining Safety/Judgement: Awareness of environment; Fall prevention;Good awareness of safety precautions; Home safety Pain: 
Pre-treatment: 9/10 Post-treatment: 9/10  Pt was given pain medication during session. Activity Tolerance:  
Pt overall tolerated session well with no signs of fatigue or distress. Please refer to the flowsheet for vital signs taken during this treatment. After treatment:  
[x]  Patient left in no apparent distress sitting up on EOB []  Patient left in no apparent distress in bed 
[]  Call bell left within reach 
[]  Nursing notified 
[]  Caregiver present 
[]  Bed alarm activated COMMUNICATION/EDUCATION:  
Communication/Collaboration: 
[x]      Home safety education was provided and the patient/caregiver indicated understanding. [x]      Patient/family have participated as able and agree with findings and recommendations. []      Patient is unable to participate in plan of care at this time. Thank you for this referral. 
Rula Zaidi OTR/L Time Calculation: 24 mins Eval Complexity: History: MEDIUM Complexity : Expanded review of history including physical, cognitive and psychosocial  history ; Examination: LOW Complexity : 1-3 performance deficits relating to physical, cognitive , or psychosocial skils that result in activity limitations and / or participation restrictions ; Decision Making:LOW Complexity : No comorbidities that affect functional and no verbal or physical assistance needed to complete eval tasks

## 2019-02-05 NOTE — PROGRESS NOTES
Progress Note Patient: Jose M Howe Said               Sex: male          DOA: 2/4/2019 YOB: 1960      Age:  62 y.o.        LOS:  LOS: 1 day Subjective:  
Pt seen today for follow up of cellulitis of the great toe and PVD. He ius feeling better but still reporting pain 10/10. He wants more morphine. Objective:  
  
Visit Vitals /71 (BP 1 Location: Right arm, BP Patient Position: At rest) Pulse 89 Temp 98.6 °F (37 °C) Resp 18 Ht 5' 4\" (1.626 m) Wt 49.9 kg (110 lb) SpO2 98% BMI 18.88 kg/m² Physical Exam: 
Erythema and edema is much better. Wounds are healing. But great toe is still quite tender Intake and Output: 
Current Shift:  No intake/output data recorded. Last three shifts:  02/03 1901 - 02/05 0700 In: -  
Out: 011 [[id number]] Lab/Data Reviewed: All lab results for the last 24 hours reviewed. All lab results for the last 24 hours reviewed. Medications Reviewed Assessment/Plan Principal Problem: 
  Cellulitis of left foot (2/4/2019) Active Problems: Hypertension (2/4/2019) Smoker (2/4/2019) Pancreatitis chronic (2/4/2019) Gout (2/4/2019) PAD (peripheral artery disease) ([address] (2/4/2019) Alcohol use (2/4/2019) Hyponatremia (2/4/2019) Cocaine abuse ([address] (2/5/2019) Awaiting angiogram by Dr Iveth Man I would like to remove hardware once he has been cleared from a vascular standpoint. Gauri Gaviria, DPANAIS 
February 5, 2019

## 2019-02-05 NOTE — ROUTINE PROCESS
Bedside and Verbal shift change report given to Magdaleno Engel RN (oncoming nurse) by Gene Rouse RN (offgoing nurse). Report included the following information SBAR.

## 2019-02-05 NOTE — CONSULTS
VASCULAR CONSULTATION NOTE    A vascular consultation has been requested on Glynn Lang, who is a 62 y.o. male admitted to the hospital on 2/4/2019 with an admitting diagnosis of Cellulitis of left foot [L03.116]. He is being seen for evaluation and possible treatment of  Poorly healing left foot wound    Attending Physician: Dr. Nguyen Funk    HPI:  61 yo male w/ HTN and tob abuse who presents with poorly healing left foot wound after podiatric intervention for hammer toe. Patient has had right to left fem-fem bypass with GSV from Left thigh and this is patent. He has a palpable right popliteal and left popliteal pulse but I do not appreciate palpable pedal pulses bilaterally. Past Medical History:   Diagnosis Date    Gout     Hypertension     Pancreatitis chronic      Past Surgical History:   Procedure Laterality Date    HX CHOLECYSTECTOMY      VASCULAR SURGERY PROCEDURE UNLIST      vascular       No family history on file. No Known Allergies    Home Medications:   [unfilled]    In Patient Medications:   [unfilled]    Review of Systems:   Constitutional: negative for sweats  Respiratory: negative for cough or sputum  Cardiovascular: negative for chest pain  Gastrointestinal: negative for abdominal pain  Genitourinary:negative for frequency  Musculoskeletal:positive for bone pain  Neurological: negative for seizures  Behavioral/Psych: negative for bipolar    Physical Assessment:       Physical Exam   Constitutional: oriented to person, place, and time and well-developed, well-nourished, and in no distress. HENT:   Head: Normocephalic. Eyes: Conjunctivae are normal.   Neck: Normal range of motion. Neck supple. Normal carotid pulses and no JVD present. Carotid bruit is not present. Cardiovascular: Normal rate, regular rhythm, normal heart sounds   Pulses:       Carotid pulses are 2+ on the right side, and 2+ on the left side.        Radial pulses are 2+ on the right side, and 2+ on the left side.        Femoral pulses are 2+ on the right side, and 2+ on the left side. Popliteal pulses are 2+ on the right side, and 2+ on the left side. Dorsalis pedis pulses are 0 on the right side, and 0 on the left side. Posterior tibial pulses are 0 on the right side, and 0 on the left side. Normal PMI, no palpable thrills; lifts or palpable S3 or S4   Pulmonary/Chest: Effort normal and breath sounds normal. Has no wheezes. No rales. Abdominal: Soft. Exhibits no distension, no abdominal bruit, no pulsatile midline mass and no mass. There is no hepatomegaly. No tenderness. No guarding. Palpable pulse in right to left fem-fem bypass lower abdomen. Musculoskeletal: Normal range of motion. Exhibits no edema, left dorsal foot is tender over 1st MT. Lymphadenopathy:  Has no cervical adenopathy. Neurological:  Alert and oriented to person, place, and time. Displays no weakness, facial symmetry and normal speech. Gait normal.  Skin: Skin is warm and dry. No rash noted. No erythema. No pallor. Psychiatric: Mood and affect normal.             Lab   CBC(Brief) w/Diff   Lab Results   Component Value Date/Time    WBC 15.1 (H) 02/04/2019 12:17 PM    HCT 40.0 02/04/2019 12:17 PM    MCV 87.9 02/04/2019 12:17 PM        Lab Results   Component Value Date    INR 0.9 02/04/2019    APTT 25.9 10/29/2018     Lab Results   Component Value Date     (L) 02/04/2019    CO2 28 02/04/2019    BUN 10 02/04/2019       Xray       PVL       Impression:     1. 63 yo male with PAD and poorly healing left foot wound with underlying infection who will require explantation of hardware per Dr Chhaya Casarez. Plan:      Will obtain non-invasive arterial studies tomorrow  Likely angiography with intervention Wednesday 2/6    [address] [facility]  [employer] Asst Prof Surgery  PG [phone]  Cell [phone]

## 2019-02-05 NOTE — ROUTINE PROCESS
Bedside and Verbal shift change report given to Shayy Trammell RN (oncoming nurse) by Sara Fallon RN (offgoing nurse). Report included the following information SBAR, Kardex, ED Summary, Procedure Summary, Intake/Output, MAR, Recent Results and Med Rec Status.

## 2019-02-05 NOTE — PROGRESS NOTES
INITIAL NUTRITION ASSESSMENT  
RECOMMENDATIONS/PLAN:  
Adv diet per MD 
Avoid prolonged NPO diet order as it does not meet estimated needs Needs MVI added Monitor labs/lytes, diet adv, skin integrity, wt, fluid status, BM 
 
REASON FOR ASSESSMENT:  
 
[] Positive Nutrition Screen: 
[x] BMI <19 NUTRITION ASSESSMENT:  
Client History: 62 yrs old Male admitted with left unhealing foot wound, cellulitis, hardware infection, PAD, HTN, chronic pancreatitis, hyponatremia. Noted edema around great toe fusion site PMHx: Gout, HTN, pancreatitis chronic Cultural/Quaker Food Preferences: None Identified FOOD/NUTRITION HISTORY Diet History: pt in a lot of pain and upset that he has not gotten anything to eat; pt not a good answer of questions due to pain and hunger; pt did report that he sometimes eats three meals at home and takes iron and calcium supplements. Unable to do physical assessment at this time Food Allergies:  - NKFA Pertinent PTA Medications: pepcid, creon, colcrys NUTRITION INTAKE Diet Order:  NPO Average PO Intake:      
No data found. Pertinent Medications:  - Reviewed; colace, folic acid, zenpep, thiamine Electrolyte Replacement Protocol: -K  -Mg  -PO4 Insulin:  - SSI  - Pre-meal   -  Basal   - Drip  - None Pt expected to meet estimated nutrient needs through next review:          -  Yes     - No; NPO does not meet estimated needs ANTHROPOMETRICS Height: 5' 4\" (162.6 cm)       Weight: 49.9 kg (110 lb) BMI: 18.9 kg/m^2  -  normal weight (18.5%-24.9% BMI) Weight change: no wt loss per chart review Comparison to Reference Standards: IBW: 130 lbs      %IBW:85%      AdjBW: n/a NUTRITION-FOCUSED PHYSICAL ASSESSMENT Skin: left unhealing foot wound. GI: +BM 2/4/19 BIOCHEMICAL DATA & MEDICAL TESTS Pertinent Labs:  - Reviewed; NUTRITION PRESCRIPTION Calories: 7307-8760 kcal/day based on 35-40kcal/kg Protein: 60-75 g/day based on 1.2-1.5 g/kg Fluid: 4077-6439 ml/day based on 1 kcal/ml NUTRITION DIAGNOSES:  
1. Malnutrition related to inadequate oral intake as evidence by BMI 18.9, unhealing foot wound NUTRITION INTERVENTIONS:  
INTERVENTIONS:        GOALS: 
1. Diet: Adv diet per MD 1. Adv diet per MD by next review 3 days LEARNING NEEDS (Diet, Supplementation, Food/Nutrient-Drug Interaction):  
[] None Identified 
[] Inpatient education provided/documented   
[x] Identified and patient:  [] Declined     [x] Was not appropriate/indicated NUTRITION MONITORING /EVALUATION:  
Monitor wt Monitor renal labs, electrolytes, fluid status 
 
[] Participated in Interdisciplinary Rounds 
[x] [facility] Reviewed/Documented DISCHARGE NUTRITION RECOMMENDATIONS ADDRESSED:  
 
  [x] To be determined closer to discharge NUTRITION RISK:     [x]  At risk                     []  Not currently at risk Will follow-up per policy. Adalberto Mak 1

## 2019-02-05 NOTE — PROGRESS NOTES
Hospitalist Progress Note-critical care note Patient: Brian Hough MRN: [id number]  CSN: [id number] YOB: 1960  Age: 62 y.o. Sex: male DOA: 2/4/2019 LOS:  LOS: 1 day Chief complaint: foot cellulitis. Htn. Smoker , cocaine abuse   
htn Assessment/Plan Hospital Problems  Date Reviewed: 6/2/2013 Codes Class Noted POA Cocaine abuse ([facility] [address] ICD-10-CM: F14.10 ICD-9-CM: 305.60  2/5/2019 Unknown * (Principal) Cellulitis of left foot ICD-10-CM: L03.116 ICD-9-CM: 682.7  2/4/2019 Unknown Hypertension ICD-10-CM: I10 
ICD-9-CM: 401.9  2/4/2019 Unknown Smoker ICD-10-CM: Z00.252 ICD-9-CM: 305.1  2/4/2019 Unknown Pancreatitis chronic ICD-9-CM: 577.1  2/4/2019 Unknown Gout ICD-10-CM: M10.9 ICD-9-CM: 274.9  2/4/2019 Unknown PAD (peripheral artery disease) (HCC) ICD-10-CM: I73.9 ICD-9-CM: 443.9  2/4/2019 Unknown Alcohol use ICD-10-CM: Z78.9 ICD-9-CM: V49.89  2/4/2019 Hyponatremia ICD-10-CM: E87.1 ICD-9-CM: 276.1  2/4/2019 Unknown Foot cellulitis-left Due to hardwire infection Dr. Nathen Hodgkin is planning surgery after vascular evaluation  
picc line placed  
vanc and zosyn Pain control Will feed pt  
  
  
Pad Vascular on board, will do pal and angiography tomorrow Need to stop smoking  
  
 HTN, accelerated Better controlled, cocaine positive Continue home medication. 
  
Smoker Nicotine patch  
  
Chronic pancreatitis Due to alcohol drinking Continue creon  
  
Hyponatremia Resolved per iv ns, will d/c fluid-start feeding now  
  Alcohol use Reported not daily drinker now On ciwa protocol  
  
Cocaine abuse Subjective: foot pain Nurse: positive cocaine-on iv dilaudid for pain Pain meds has been upgraded to dilaudid Disposition :tbd, Review of systems: 
 
General: No fevers or chills. Cardiovascular: No chest pain or pressure. No palpitations. Pulmonary: No shortness of breath. Gastrointestinal: No nausea, vomiting. Msk: foot pain Vital signs/Intake and Output: 
Visit Vitals /83 Pulse 68 Temp 99.5 °F (37.5 °C) Resp 18 Ht 5' 4\" (1.626 m) Wt 49.9 kg (110 lb) SpO2 99% BMI 18.88 kg/m² Current Shift:  No intake/output data recorded. Last three shifts:  02/03 1901 - 02/05 0700 In: -  
Out: 462 [WBOWU:488] Physical Exam: 
General: WD, WN. Alert, cooperative, no acute distress   
HEENT: NC, Atraumatic. PERRLA, anicteric sclerae. Lungs: CTA Bilaterally. No Wheezing/Rhonchi/Rales. Heart:  Regular  rhythm,  No murmur, No Rubs, No Gallops Abdomen: Soft, Non distended, Non tender.  +Bowel sounds, Extremities: No c/c. Left foot erythema /swelling better Psych:   Not anxious or agitated. Neurologic:  No acute neurological deficit. Labs: Results:  
   
Chemistry Recent Labs 02/05/19 
1695 02/04/19 
1217 GLU 77 103*  133* K 4.1 4.0  
 98* CO2 26 28 BUN 11 10 CREA 0.86 0.80 CA 8.8 9.1 AGAP 7 7 BUCR 13 13 * 171* TP 6.7 8.0 ALB 3.1* 3.6 GLOB 3.6 4.4* AGRAT 0.9 0.8 CBC w/Diff Recent Labs 02/05/19 
8085 02/04/19 
1217 WBC 9.4 15.1*  
RBC 4.43* 4.55* HGB 12.2* 12.8* HCT 39.0 40.0  351 GRANS 69 80* LYMPH 12* 6*  
EOS 4 1 Cardiac Enzymes No results for input(s): CPK, CKND1, DOUGLAS in the last 72 hours. No lab exists for component: Carmen Orellana Coagulation Recent Labs 02/04/19 
1155 PTP 12.3 INR 0.9 Lipid Panel Lab Results Component Value Date/Time Cholesterol, total 126 02/24/2014 09:52 AM  
 HDL Cholesterol 58 02/24/2014 09:52 AM  
 LDL, calculated 60.6 02/24/2014 09:52 AM  
 VLDL, calculated 7.4 02/24/2014 09:52 AM  
 Triglyceride 37 02/24/2014 09:52 AM  
 CHOL/HDL Ratio 2.2 02/24/2014 09:52 AM  
  
BNP No results for input(s): BNPP in the last 72 hours. Liver Enzymes Recent Labs 02/05/19 
2473 TP 6.7 ALB 3.1* * SGOT 15 Thyroid Studies Lab Results Component Value Date/Time TSH 0.84 02/24/2014 09:52 AM  
    
Procedures/imaging: see electronic medical records for all procedures/Xrays and details which were not copied into this note but were reviewed prior to creation of Plan Danelle Vega MD

## 2019-02-05 NOTE — PROGRESS NOTES
1955-Pt received in bed A+Ox4, lung sounds clear in all lobes, pt. On room air, bowel sounds active, PICC line,double lumen with 0.9 NS infusing at 75ml/hr. Pt. Is NPO. Uses urinal. Has a cane at bedside for ambulation. Pt requested pain medication and rated his pain as a 9/10. Pt was given Toradol 15mg IV and Morphine 1mg IV for great toe pain. Pt has three stitches in his great toe. At 2110, pt rated his pain as a 9/10. At 2210, pt rated his pain as a 7/10.  
2130-CIWA score was 2. 
2330-CIWA score was  2 . Pt's B/p was 184/120. MD was jaylene. Dilaudid 1mg IV was ordered for pain. 0043-Pt rated his pain as a 10/10. Pt was given Dilaudid 1mg IV. At 0000- Pt was given Piperacillin IVPB via his PICC. 0135-Pt was given Vancomycin 750mg/250ml IVPB. 0143-Pt will be reassessed at 0143 for pain and his b/p. 
0148-Pt rated his pain in his great toe as a 8/10. Pt's b/p was 167/84.  
0200-Pt was given Hydralazine 10mg IVP. 0350-Pts' b/p was 147/103. 
0351- Pt was given Dilaudid 1mg/ml IVP. Pt will be reassessed in one hour. 0429-Pts. B/p-131/86. Pt continues to rate pain as a 10/10. 
0530- Pt. Continues to rate pain as a 7/10. Pt is lying awake in bed. 
0600-Pt is receiving Zosyn 3.375mg IV.
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
## 2019-02-05 NOTE — ROUTINE PROCESS
Spoke to Selina Merlin from radiology regarding prep of groin sites this evening in preparation for procedure tomorrow and states that MD states to GIVE heparin as ordered and to place pt NPO status at midnight.

## 2019-02-05 NOTE — PROGRESS NOTES
Arterial duplex with evidence of significant tibial level disease. Will perform angio tomorrow. Patience Londono MD [facility] [employer] Asst Prof Surgery PG [phone] Cell [phone]

## 2019-02-05 NOTE — WOUND CARE
Received consult for foot wound. According to notes, patient is currently being treated by Dr. Haile Palmer. Please consult wound care if any further need arises.

## 2019-02-06 ENCOUNTER — APPOINTMENT (OUTPATIENT)
Dept: INTERVENTIONAL RADIOLOGY/VASCULAR | Age: 59
DRG: 477 | End: 2019-02-06
Attending: SURGERY
Payer: MEDICARE

## 2019-02-06 LAB
ALBUMIN SERPL-MCNC: 2.8 G/DL (ref 3.4–5)
ALBUMIN/GLOB SERPL: 0.8 {RATIO} (ref 0.8–1.7)
ALP SERPL-CCNC: 134 U/L (ref 45–117)
ALT SERPL-CCNC: 14 U/L (ref 16–61)
ANION GAP SERPL CALC-SCNC: 6 MMOL/L (ref 3–18)
AST SERPL-CCNC: 11 U/L (ref 15–37)
BACTERIA SPEC CULT: ABNORMAL
BACTERIA SPEC CULT: ABNORMAL
BASOPHILS # BLD: 0 K/UL (ref 0–0.1)
BASOPHILS NFR BLD: 0 % (ref 0–2)
BILIRUB SERPL-MCNC: 0.4 MG/DL (ref 0.2–1)
BUN SERPL-MCNC: 12 MG/DL (ref 7–18)
BUN/CREAT SERPL: 15 (ref 12–20)
CALCIUM SERPL-MCNC: 8.4 MG/DL (ref 8.5–10.1)
CHLORIDE SERPL-SCNC: 109 MMOL/L (ref 100–108)
CO2 SERPL-SCNC: 26 MMOL/L (ref 21–32)
CREAT SERPL-MCNC: 0.78 MG/DL (ref 0.6–1.3)
DATE LAST DOSE: NORMAL
DIFFERENTIAL METHOD BLD: ABNORMAL
EOSINOPHIL # BLD: 0.5 K/UL (ref 0–0.4)
EOSINOPHIL NFR BLD: 4 % (ref 0–5)
ERYTHROCYTE [DISTWIDTH] IN BLOOD BY AUTOMATED COUNT: 12.5 % (ref 11.6–14.5)
GLOBULIN SER CALC-MCNC: 3.4 G/DL (ref 2–4)
GLUCOSE SERPL-MCNC: 103 MG/DL (ref 74–99)
GRAM STN SPEC: ABNORMAL
GRAM STN SPEC: ABNORMAL
HCT VFR BLD AUTO: 34.3 % (ref 36–48)
HGB BLD-MCNC: 10.6 G/DL (ref 13–16)
LEFT ARTERIAL PROX ANASTOMOSIS EDV: 27.6 CM/S
LEFT ARTERIAL PROX ANASTOMOSIS PSV: 183.6 CM/S
LEFT CFA DIST SYS PSV: 141.8 CM/S
LEFT DIST ATA VELOCITY: 16.8 CM/S
LEFT DIST OUTFLOW EDV: 14.1 CM/S
LEFT DIST OUTFLOW PSV: 47.8 CM/S
LEFT DIST PTA PSV: 44.5 CM/S
LEFT GRAFT 1 NAME: NORMAL
LEFT INFLOW ARTERY EDV: 33.2 CM/S
LEFT INFLOW ARTERY PSV: 180.8 CM/S
LEFT MID ATA VELOCITY: 14 CM/S
LEFT MID OUTFLOW EDV: 16.7 CM/S
LEFT MID OUTFLOW PSV: 63.3 CM/S
LEFT PROX ATA VELOCITY: 50.4 CM/S
LEFT PROX OUTFLOW EDV: 20.7 CM/S
LEFT PROX OUTFLOW PSV: 139.2 CM/S
LEFT PROX PFA A PSV: 125.1 CM/S
LEFT PTA MID SYS PSV: 23.9 CM/S
LEFT SFA PROX VEL RATIO: 1.53
LEFT SUPER FEMORAL PROX SYS PSV: 217 CM/S
LYMPHOCYTES # BLD: 1 K/UL (ref 0.9–3.6)
LYMPHOCYTES NFR BLD: 10 % (ref 21–52)
MAGNESIUM SERPL-MCNC: 1.9 MG/DL (ref 1.6–2.6)
MCH RBC QN AUTO: 27.4 PG (ref 24–34)
MCHC RBC AUTO-ENTMCNC: 30.9 G/DL (ref 31–37)
MCV RBC AUTO: 88.6 FL (ref 74–97)
MONOCYTES # BLD: 1.5 K/UL (ref 0.05–1.2)
MONOCYTES NFR BLD: 14 % (ref 3–10)
NEUTS SEG # BLD: 7.8 K/UL (ref 1.8–8)
NEUTS SEG NFR BLD: 72 % (ref 40–73)
PLATELET # BLD AUTO: 304 K/UL (ref 135–420)
PMV BLD AUTO: 9.9 FL (ref 9.2–11.8)
POTASSIUM SERPL-SCNC: 3.7 MMOL/L (ref 3.5–5.5)
PROT SERPL-MCNC: 6.2 G/DL (ref 6.4–8.2)
RBC # BLD AUTO: 3.87 M/UL (ref 4.7–5.5)
REPORTED DOSE,DOSE: NORMAL UNITS
REPORTED DOSE/TIME,TMG: 1300
RIGHT ARTERIAL PROX ANASTOMOSIS EDV: 42.6 CM/S
RIGHT ARTERIAL PROX ANASTOMOSIS PSV: 212.5 CM/S
RIGHT DIST OUTFLOW EDV: 14.9 CM/S
RIGHT DIST OUTFLOW PSV: 78 CM/S
RIGHT EXT ILIAC DIST VEL: 0 CM/S
RIGHT GRAFT 1 NAME: NORMAL
RIGHT INFLOW ARTERY EDV: 42.6 CM/S
RIGHT INFLOW ARTERY PSV: 176.4 CM/S
RIGHT MID OUTFLOW EDV: 24.6 CM/S
RIGHT MID OUTFLOW PSV: 92.5 CM/S
RIGHT OUTFLOW VESSEL EDV: 14.6 CM/S
RIGHT OUTFLOW VESSEL PSV: 92.6 CM/S
RIGHT PROX OUTFLOW EDV: 18.2 CM/S
RIGHT PROX OUTFLOW PSV: 84.4 CM/S
RIGHT PROX PFA A PSV: 129.7 CM/S
RIGHT SUPER FEMORAL PROX SYS PSV: 92.6 CM/S
SERVICE CMNT-IMP: ABNORMAL
SODIUM SERPL-SCNC: 141 MMOL/L (ref 136–145)
VANCOMYCIN TROUGH SERPL-MCNC: 12 UG/ML (ref 10–20)
WBC # BLD AUTO: 10.8 K/UL (ref 4.6–13.2)

## 2019-02-06 PROCEDURE — C1887 CATHETER, GUIDING: HCPCS

## 2019-02-06 PROCEDURE — 74011250636 HC RX REV CODE- 250/636

## 2019-02-06 PROCEDURE — 99153 MOD SED SAME PHYS/QHP EA: CPT

## 2019-02-06 PROCEDURE — C1725 CATH, TRANSLUMIN NON-LASER: HCPCS

## 2019-02-06 PROCEDURE — 74011250637 HC RX REV CODE- 250/637: Performed by: HOSPITALIST

## 2019-02-06 PROCEDURE — 97116 GAIT TRAINING THERAPY: CPT

## 2019-02-06 PROCEDURE — 04CN3ZZ EXTIRPATION OF MATTER FROM LEFT POPLITEAL ARTERY, PERCUTANEOUS APPROACH: ICD-10-PCS | Performed by: SURGERY

## 2019-02-06 PROCEDURE — 74011250636 HC RX REV CODE- 250/636: Performed by: INTERNAL MEDICINE

## 2019-02-06 PROCEDURE — 74011250636 HC RX REV CODE- 250/636: Performed by: HOSPITALIST

## 2019-02-06 PROCEDURE — 047Q3ZZ DILATION OF LEFT ANTERIOR TIBIAL ARTERY, PERCUTANEOUS APPROACH: ICD-10-PCS | Performed by: SURGERY

## 2019-02-06 PROCEDURE — 80053 COMPREHEN METABOLIC PANEL: CPT

## 2019-02-06 PROCEDURE — 99152 MOD SED SAME PHYS/QHP 5/>YRS: CPT

## 2019-02-06 PROCEDURE — 74011250636 HC RX REV CODE- 250/636: Performed by: SURGERY

## 2019-02-06 PROCEDURE — C1876 STENT, NON-COA/NON-COV W/DEL: HCPCS

## 2019-02-06 PROCEDURE — 80202 ASSAY OF VANCOMYCIN: CPT

## 2019-02-06 PROCEDURE — 97530 THERAPEUTIC ACTIVITIES: CPT

## 2019-02-06 PROCEDURE — 36592 COLLECT BLOOD FROM PICC: CPT

## 2019-02-06 PROCEDURE — 85025 COMPLETE CBC W/AUTO DIFF WBC: CPT

## 2019-02-06 PROCEDURE — 047N3DZ DILATION OF LEFT POPLITEAL ARTERY WITH INTRALUMINAL DEVICE, PERCUTANEOUS APPROACH: ICD-10-PCS | Performed by: SURGERY

## 2019-02-06 PROCEDURE — 047W3ZZ DILATION OF LEFT FOOT ARTERY, PERCUTANEOUS APPROACH: ICD-10-PCS | Performed by: SURGERY

## 2019-02-06 PROCEDURE — 74011000258 HC RX REV CODE- 258: Performed by: HOSPITALIST

## 2019-02-06 PROCEDURE — 65270000029 HC RM PRIVATE

## 2019-02-06 PROCEDURE — 74011636320 HC RX REV CODE- 636/320: Performed by: HOSPITALIST

## 2019-02-06 PROCEDURE — 83735 ASSAY OF MAGNESIUM: CPT

## 2019-02-06 RX ORDER — FENTANYL CITRATE 50 UG/ML
INJECTION, SOLUTION INTRAMUSCULAR; INTRAVENOUS
Status: COMPLETED
Start: 2019-02-06 | End: 2019-02-06

## 2019-02-06 RX ORDER — HEPARIN SODIUM 1000 [USP'U]/ML
INJECTION, SOLUTION INTRAVENOUS; SUBCUTANEOUS
Status: DISPENSED
Start: 2019-02-06 | End: 2019-02-07

## 2019-02-06 RX ORDER — FLUMAZENIL 0.1 MG/ML
0.2 INJECTION INTRAVENOUS
Status: DISCONTINUED | OUTPATIENT
Start: 2019-02-06 | End: 2019-02-11 | Stop reason: HOSPADM

## 2019-02-06 RX ORDER — NALOXONE HYDROCHLORIDE 0.4 MG/ML
0.2 INJECTION, SOLUTION INTRAMUSCULAR; INTRAVENOUS; SUBCUTANEOUS AS NEEDED
Status: DISCONTINUED | OUTPATIENT
Start: 2019-02-06 | End: 2019-02-11 | Stop reason: HOSPADM

## 2019-02-06 RX ORDER — HEPARIN SODIUM 200 [USP'U]/100ML
INJECTION, SOLUTION INTRAVENOUS
Status: DISPENSED
Start: 2019-02-06 | End: 2019-02-07

## 2019-02-06 RX ORDER — VERAPAMIL HYDROCHLORIDE 2.5 MG/ML
INJECTION, SOLUTION INTRAVENOUS
Status: DISCONTINUED
Start: 2019-02-06 | End: 2019-02-06 | Stop reason: WASHOUT

## 2019-02-06 RX ORDER — HYDRALAZINE HYDROCHLORIDE 20 MG/ML
20 INJECTION INTRAMUSCULAR; INTRAVENOUS
Status: COMPLETED | OUTPATIENT
Start: 2019-02-06 | End: 2019-02-06

## 2019-02-06 RX ORDER — MIDAZOLAM HYDROCHLORIDE 1 MG/ML
INJECTION, SOLUTION INTRAMUSCULAR; INTRAVENOUS
Status: DISPENSED
Start: 2019-02-06 | End: 2019-02-07

## 2019-02-06 RX ORDER — NALOXONE HYDROCHLORIDE 0.4 MG/ML
INJECTION, SOLUTION INTRAMUSCULAR; INTRAVENOUS; SUBCUTANEOUS
Status: DISCONTINUED
Start: 2019-02-06 | End: 2019-02-06 | Stop reason: WASHOUT

## 2019-02-06 RX ORDER — LABETALOL HCL 20 MG/4 ML
SYRINGE (ML) INTRAVENOUS
Status: COMPLETED
Start: 2019-02-06 | End: 2019-02-06

## 2019-02-06 RX ORDER — CLONIDINE HYDROCHLORIDE 0.1 MG/1
0.1 TABLET ORAL
Status: COMPLETED | OUTPATIENT
Start: 2019-02-06 | End: 2019-02-06

## 2019-02-06 RX ORDER — NITROGLYCERIN 10 MG/100ML
INJECTION INTRAVENOUS
Status: DISCONTINUED
Start: 2019-02-06 | End: 2019-02-06 | Stop reason: WASHOUT

## 2019-02-06 RX ORDER — MIDAZOLAM HYDROCHLORIDE 1 MG/ML
.5-4 INJECTION, SOLUTION INTRAMUSCULAR; INTRAVENOUS
Status: DISCONTINUED | OUTPATIENT
Start: 2019-02-06 | End: 2019-02-11 | Stop reason: HOSPADM

## 2019-02-06 RX ORDER — FLUMAZENIL 0.1 MG/ML
INJECTION INTRAVENOUS
Status: DISCONTINUED
Start: 2019-02-06 | End: 2019-02-06 | Stop reason: WASHOUT

## 2019-02-06 RX ORDER — LIDOCAINE HYDROCHLORIDE 10 MG/ML
1-10 INJECTION, SOLUTION EPIDURAL; INFILTRATION; INTRACAUDAL; PERINEURAL
Status: COMPLETED | OUTPATIENT
Start: 2019-02-06 | End: 2019-02-06

## 2019-02-06 RX ORDER — FENTANYL CITRATE 50 UG/ML
INJECTION, SOLUTION INTRAMUSCULAR; INTRAVENOUS
Status: DISPENSED
Start: 2019-02-06 | End: 2019-02-07

## 2019-02-06 RX ORDER — HEPARIN SODIUM 1000 [USP'U]/ML
10000 INJECTION, SOLUTION INTRAVENOUS; SUBCUTANEOUS
Status: DISCONTINUED | OUTPATIENT
Start: 2019-02-06 | End: 2019-02-11 | Stop reason: HOSPADM

## 2019-02-06 RX ORDER — HYDRALAZINE HYDROCHLORIDE 20 MG/ML
INJECTION INTRAMUSCULAR; INTRAVENOUS
Status: COMPLETED
Start: 2019-02-06 | End: 2019-02-06

## 2019-02-06 RX ORDER — HEPARIN SODIUM 200 [USP'U]/100ML
1000 INJECTION, SOLUTION INTRAVENOUS
Status: COMPLETED | OUTPATIENT
Start: 2019-02-06 | End: 2019-02-06

## 2019-02-06 RX ORDER — HYDROMORPHONE HYDROCHLORIDE 1 MG/ML
2 INJECTION, SOLUTION INTRAMUSCULAR; INTRAVENOUS; SUBCUTANEOUS
Status: DISCONTINUED | OUTPATIENT
Start: 2019-02-06 | End: 2019-02-11

## 2019-02-06 RX ORDER — LIDOCAINE HYDROCHLORIDE 10 MG/ML
INJECTION, SOLUTION EPIDURAL; INFILTRATION; INTRACAUDAL; PERINEURAL
Status: DISPENSED
Start: 2019-02-06 | End: 2019-02-07

## 2019-02-06 RX ORDER — FENTANYL CITRATE 50 UG/ML
25-200 INJECTION, SOLUTION INTRAMUSCULAR; INTRAVENOUS
Status: DISCONTINUED | OUTPATIENT
Start: 2019-02-06 | End: 2019-02-11

## 2019-02-06 RX ORDER — LABETALOL HCL 20 MG/4 ML
10 SYRINGE (ML) INTRAVENOUS
Status: COMPLETED | OUTPATIENT
Start: 2019-02-06 | End: 2019-02-06

## 2019-02-06 RX ORDER — MIDAZOLAM HYDROCHLORIDE 1 MG/ML
INJECTION, SOLUTION INTRAMUSCULAR; INTRAVENOUS
Status: COMPLETED
Start: 2019-02-06 | End: 2019-02-06

## 2019-02-06 RX ADMIN — MIDAZOLAM HYDROCHLORIDE 0.5 MG: 1 INJECTION, SOLUTION INTRAMUSCULAR; INTRAVENOUS at 14:32

## 2019-02-06 RX ADMIN — SODIUM CHLORIDE 75 ML/HR: 900 INJECTION, SOLUTION INTRAVENOUS at 06:08

## 2019-02-06 RX ADMIN — HYDRALAZINE HYDROCHLORIDE 20 MG: 20 INJECTION INTRAMUSCULAR; INTRAVENOUS at 15:53

## 2019-02-06 RX ADMIN — HEPARIN SODIUM 5000 UNITS: 5000 INJECTION INTRAVENOUS; SUBCUTANEOUS at 21:46

## 2019-02-06 RX ADMIN — PIPERACILLIN SODIUM,TAZOBACTAM SODIUM 3.38 G: 3; .375 INJECTION, POWDER, FOR SOLUTION INTRAVENOUS at 18:15

## 2019-02-06 RX ADMIN — LABETALOL HYDROCHLORIDE 10 MG: 5 INJECTION, SOLUTION INTRAVENOUS at 14:12

## 2019-02-06 RX ADMIN — MIDAZOLAM HYDROCHLORIDE 1 MG: 1 INJECTION, SOLUTION INTRAMUSCULAR; INTRAVENOUS at 13:57

## 2019-02-06 RX ADMIN — Medication 10 ML: at 23:18

## 2019-02-06 RX ADMIN — LISINOPRIL 40 MG: 20 TABLET ORAL at 09:38

## 2019-02-06 RX ADMIN — HYDROMORPHONE HYDROCHLORIDE 1 MG: 1 INJECTION, SOLUTION INTRAMUSCULAR; INTRAVENOUS; SUBCUTANEOUS at 06:17

## 2019-02-06 RX ADMIN — LABETALOL HYDROCHLORIDE 10 MG: 5 INJECTION, SOLUTION INTRAVENOUS at 14:02

## 2019-02-06 RX ADMIN — Medication 10 ML: at 06:17

## 2019-02-06 RX ADMIN — HEPARIN SODIUM 5000 UNITS: 1000 INJECTION INTRAVENOUS; SUBCUTANEOUS at 14:11

## 2019-02-06 RX ADMIN — MIDAZOLAM HYDROCHLORIDE 0.5 MG: 1 INJECTION, SOLUTION INTRAMUSCULAR; INTRAVENOUS at 15:10

## 2019-02-06 RX ADMIN — AMLODIPINE BESYLATE 10 MG: 5 TABLET ORAL at 09:38

## 2019-02-06 RX ADMIN — HYDROMORPHONE HYDROCHLORIDE 1 MG: 1 INJECTION, SOLUTION INTRAMUSCULAR; INTRAVENOUS; SUBCUTANEOUS at 02:57

## 2019-02-06 RX ADMIN — FENTANYL CITRATE 25 MCG: 50 INJECTION, SOLUTION INTRAMUSCULAR; INTRAVENOUS at 15:01

## 2019-02-06 RX ADMIN — LIDOCAINE HYDROCHLORIDE 3 ML: 10 INJECTION, SOLUTION EPIDURAL; INFILTRATION; INTRACAUDAL; PERINEURAL at 16:07

## 2019-02-06 RX ADMIN — FENTANYL CITRATE 25 MCG: 50 INJECTION, SOLUTION INTRAMUSCULAR; INTRAVENOUS at 15:28

## 2019-02-06 RX ADMIN — FENTANYL CITRATE 50 MCG: 50 INJECTION, SOLUTION INTRAMUSCULAR; INTRAVENOUS at 13:57

## 2019-02-06 RX ADMIN — OMEPRAZOLE 20 MG: 20 CAPSULE, DELAYED RELEASE ORAL at 09:38

## 2019-02-06 RX ADMIN — PIPERACILLIN SODIUM,TAZOBACTAM SODIUM 3.38 G: 3; .375 INJECTION, POWDER, FOR SOLUTION INTRAVENOUS at 12:47

## 2019-02-06 RX ADMIN — COLCHICINE 0.6 MG: 0.6 TABLET, FILM COATED ORAL at 09:38

## 2019-02-06 RX ADMIN — FENTANYL CITRATE 25 MCG: 50 INJECTION, SOLUTION INTRAMUSCULAR; INTRAVENOUS at 14:18

## 2019-02-06 RX ADMIN — HYDROMORPHONE HYDROCHLORIDE 2 MG: 1 INJECTION, SOLUTION INTRAMUSCULAR; INTRAVENOUS; SUBCUTANEOUS at 12:45

## 2019-02-06 RX ADMIN — MIDAZOLAM HYDROCHLORIDE 0.5 MG: 1 INJECTION, SOLUTION INTRAMUSCULAR; INTRAVENOUS at 14:18

## 2019-02-06 RX ADMIN — FENTANYL CITRATE 25 MCG: 50 INJECTION, SOLUTION INTRAMUSCULAR; INTRAVENOUS at 14:48

## 2019-02-06 RX ADMIN — MIDAZOLAM HYDROCHLORIDE 0.5 MG: 1 INJECTION, SOLUTION INTRAMUSCULAR; INTRAVENOUS at 14:28

## 2019-02-06 RX ADMIN — FENTANYL CITRATE 25 MCG: 50 INJECTION, SOLUTION INTRAMUSCULAR; INTRAVENOUS at 14:32

## 2019-02-06 RX ADMIN — FENTANYL CITRATE 25 MCG: 50 INJECTION, SOLUTION INTRAMUSCULAR; INTRAVENOUS at 15:10

## 2019-02-06 RX ADMIN — MIDAZOLAM HYDROCHLORIDE 0.5 MG: 1 INJECTION, SOLUTION INTRAMUSCULAR; INTRAVENOUS at 14:07

## 2019-02-06 RX ADMIN — VANCOMYCIN HYDROCHLORIDE 750 MG: 10 INJECTION, POWDER, LYOPHILIZED, FOR SOLUTION INTRAVENOUS at 02:50

## 2019-02-06 RX ADMIN — IOPAMIDOL 75 ML: 510 INJECTION, SOLUTION INTRAVASCULAR at 15:30

## 2019-02-06 RX ADMIN — TRIAMCINOLONE ACETONIDE: 1 CREAM TOPICAL at 21:59

## 2019-02-06 RX ADMIN — PIPERACILLIN SODIUM,TAZOBACTAM SODIUM 3.38 G: 3; .375 INJECTION, POWDER, FOR SOLUTION INTRAVENOUS at 01:25

## 2019-02-06 RX ADMIN — FENTANYL CITRATE 25 MCG: 50 INJECTION, SOLUTION INTRAMUSCULAR; INTRAVENOUS at 14:40

## 2019-02-06 RX ADMIN — Medication 100 MG: at 09:38

## 2019-02-06 RX ADMIN — PIPERACILLIN SODIUM,TAZOBACTAM SODIUM 3.38 G: 3; .375 INJECTION, POWDER, FOR SOLUTION INTRAVENOUS at 06:07

## 2019-02-06 RX ADMIN — CLONIDINE HYDROCHLORIDE 0.1 MG: 0.1 TABLET ORAL at 12:45

## 2019-02-06 RX ADMIN — PIPERACILLIN SODIUM,TAZOBACTAM SODIUM 3.38 G: 3; .375 INJECTION, POWDER, FOR SOLUTION INTRAVENOUS at 23:18

## 2019-02-06 RX ADMIN — VANCOMYCIN HYDROCHLORIDE 750 MG: 10 INJECTION, POWDER, LYOPHILIZED, FOR SOLUTION INTRAVENOUS at 19:22

## 2019-02-06 RX ADMIN — HYDROMORPHONE HYDROCHLORIDE 1 MG: 1 INJECTION, SOLUTION INTRAMUSCULAR; INTRAVENOUS; SUBCUTANEOUS at 09:48

## 2019-02-06 RX ADMIN — MIDAZOLAM HYDROCHLORIDE 0.5 MG: 1 INJECTION, SOLUTION INTRAMUSCULAR; INTRAVENOUS at 14:40

## 2019-02-06 RX ADMIN — Medication 10 ML: at 18:06

## 2019-02-06 RX ADMIN — HEPARIN SODIUM 5000 UNITS: 5000 INJECTION INTRAVENOUS; SUBCUTANEOUS at 06:07

## 2019-02-06 RX ADMIN — HEPARIN SODIUM IN SODIUM CHLORIDE 2000 UNITS: 200 INJECTION INTRAVENOUS at 14:39

## 2019-02-06 RX ADMIN — FENTANYL CITRATE 25 MCG: 50 INJECTION, SOLUTION INTRAMUSCULAR; INTRAVENOUS at 14:28

## 2019-02-06 RX ADMIN — HYDROMORPHONE HYDROCHLORIDE 2 MG: 1 INJECTION, SOLUTION INTRAMUSCULAR; INTRAVENOUS; SUBCUTANEOUS at 21:51

## 2019-02-06 RX ADMIN — HEPARIN SODIUM (PORCINE) LOCK FLUSH IV SOLN 100 UNIT/ML 500 UNITS: 100 SOLUTION at 01:25

## 2019-02-06 RX ADMIN — TRIAMCINOLONE ACETONIDE: 1 CREAM TOPICAL at 09:39

## 2019-02-06 RX ADMIN — MIDAZOLAM HYDROCHLORIDE 0.5 MG: 1 INJECTION, SOLUTION INTRAMUSCULAR; INTRAVENOUS at 15:01

## 2019-02-06 RX ADMIN — FOLIC ACID 1 MG: 1 TABLET ORAL at 09:38

## 2019-02-06 RX ADMIN — DOCUSATE SODIUM 100 MG: 100 CAPSULE, LIQUID FILLED ORAL at 21:02

## 2019-02-06 RX ADMIN — HYDROMORPHONE HYDROCHLORIDE 2 MG: 1 INJECTION, SOLUTION INTRAMUSCULAR; INTRAVENOUS; SUBCUTANEOUS at 18:40

## 2019-02-06 RX ADMIN — MIDAZOLAM HYDROCHLORIDE 0.5 MG: 1 INJECTION, SOLUTION INTRAMUSCULAR; INTRAVENOUS at 14:48

## 2019-02-06 RX ADMIN — FENTANYL CITRATE 25 MCG: 50 INJECTION, SOLUTION INTRAMUSCULAR; INTRAVENOUS at 14:07

## 2019-02-06 NOTE — PROGRESS NOTES
Problem: Mobility Impaired (Adult and Pediatric) Goal: *Acute Goals and Plan of Care (Insert Text) Physical Therapy Goals Initiated 2/5/2019 and to be accomplished within 4 day(s) 1. Patient will move from supine <> sit with independence in prep for out of bed activity and change of position. 2.  Patient will perform sit<> stand with mod independence with LRAD in prep for transfers/ambulation. 3.  Patient will transfer from bed <> chair with mod independence with LRAD for time up in chair for completion of ADL activity. 4.  Patient will ambulate 150 feet with modified for improved functional mobility/safe discharge. 5.  Patient will ascend/descend 3-5 stairs with handrail(s) with minimal assistance/contact guard assist for home re-entry as needed. Outcome: Progressing Towards Goal 
physical Therapy TREATMENT Patient: Nydia Cervantes (62 y.o. male) Date: 2/6/2019 Diagnosis: Cellulitis of left foot [L03.116] Cellulitis of left foot Precautions: Fall Chart, physical therapy assessment, plan of care and goals were reviewed. ASSESSMENT: 
Max encouragement needed to participate. S for donning boot to L foot. Pt also continues to report increase pain to L foot. Pt finally agreeable to ambulate a short distance. Transport arrived to take patient. Pt able to amb to naranjo then sit on bed. Pt has poor safety awareness as he attempts to sit too far away from bed. RW used this tx as standing balance impaired due to severe pain. Cont POC. Progression toward goals: 
[]      Improving appropriately and progressing toward goals [x]      Improving slowly and progressing toward goals 
[]      Not making progress toward goals and plan of care will be adjusted PLAN: 
Patient continues to benefit from skilled intervention to address the above impairments. Continue treatment per established plan of care. Discharge Recommendations:  Home Health or rehab pending progress Further Equipment Recommendations for Discharge:  rolling walker SUBJECTIVE:  
Patient stated  I just need to get home  OBJECTIVE DATA SUMMARY:  
Critical Behavior: 
Neurologic State: Alert Orientation Level: Oriented X4 Cognition: Follows commands Safety/Judgement: Awareness of environment, Fall prevention, Good awareness of safety precautions, Home safety Functional Mobility Training: 
Bed Mobility: 
Supine to Sit: Independent Sit to Supine: Independent Scooting: Independent Transfers: 
Sit to Stand: Supervision;Modified independent Stand to Sit: Supervision;Modified independent Balance: 
Sitting: Intact Standing: Intact; With supportAmbulation/Gait Training: 
Distance (ft): 25 Feet (ft) Assistive Device: Walker, rolling;Gait belt;Brace/Splint Ambulation - Level of Assistance: Supervision Gait Abnormalities: Antalgic;Decreased step clearance; Step to gait Left Side Weight Bearing: As tolerated Stance: Left decreased Speed/Becka: Slow Step Length: Right shortened;Left shortened Swing Pattern: Left asymmetrical;Right asymmetrical 
 
Pain: 
Pain Scale 1: Numeric (0 - 10) Pain Intensity 1: 0 Pain Location 1: Foot Pain Orientation 1: Left Pain Description 1: Stabbing Pain Intervention(s) 1: Medication (see MAR) Activity Tolerance:  
Fair After treatment:  
[] Patient left in no apparent distress sitting up in chair 
[x] Patient left in no apparent distress in bed 
[x] Call bell left within reach 
[] Nursing notified 
[] Caregiver present 
[] Bed alarm activated Kassy Leonardo PTA Time Calculation: 23 mins

## 2019-02-06 NOTE — PROGRESS NOTES
1600 Pt arrived to unit S/P LLE Angiogram. Dressing to rt groin dry and intact. Pt sleepy but arousable. . Rt leg precautions reinforced. 1800 Pt had an uneventful recovery. Taken to room 309 in stable condition with no complaints. Dressing to rt groin dry and intact.

## 2019-02-06 NOTE — BRIEF OP NOTE
Brief Post-Op Note Surgeon(s): [employer] Assistant(s): none Pre-operative Diagnosis: left foot ulcer Post-operative Diagnosis: same as preop diagnosis Procedure(s) Performed:  Left popliteal thrombectomy/pta/stent, left MISAEL pta Anesthesia:  Local with 1% lidocaine and Moderate Sedation Blood Adminstered:  none Findings: occluded MISAEL and TPT, small caliber PT at ankle Complications: none Estimated Blood Loss:  minimal <100 Tubes and Drains: none Implants/Grafts:  6x40 absolute pro self-expanding stent BK pop Specimens: none

## 2019-02-06 NOTE — PROGRESS NOTES
Plan for left lower extremity with possible intervention unchanged. Gem Morales MD [facility] [employer] Asst Prof Surgery PG [phone] Cell [phone]

## 2019-02-06 NOTE — ROUTINE PROCESS
Bedside and Verbal shift change report given to IRASEMA Palacio (oncoming nurse) by Burgess Alejandra RN 
 (offgoing nurse). Report included the following information SBAR, Kardex, Intake/Output and MAR.

## 2019-02-06 NOTE — PROGRESS NOTES
Report called to Lauren Gordon RN on [facility] who has assumed care of patient. Reviewed procedure, tolerance MAR and plan.

## 2019-02-06 NOTE — PROGRESS NOTES
TRANSFER - OUT REPORT: 
 
Verbal report given to  Drew Isabel(name) on [facility]  being transferred to care unit(unit) for routine progression of care Report consisted of patients Situation, Background, Assessment and  
Recommendations(SBAR). Information from the following report(s) SBAR, Procedure Summary and MAR was reviewed with the receiving nurse. Lines: PICC Double Lumen 10/29/18 Right;Brachial (Active) PICC Double Lumen 19/60/66 Left;Basilic (Active) Central Line Being Utilized Yes 2/6/2019  9:40 AM  
Criteria for Appropriate Use Long term IV/antibiotic administration 2/6/2019  9:40 AM  
Site Assessment Clean, dry, & intact 2/6/2019  9:40 AM  
Phlebitis Assessment 0 2/6/2019  9:40 AM  
Infiltration Assessment 0 2/6/2019  9:40 AM  
Date of Last Dressing Change 02/04/19 2/6/2019  9:40 AM  
Dressing Status Clean, dry, & intact 2/6/2019  9:40 AM  
Action Taken Blood drawn 2/6/2019  1:26 AM  
Dressing Type Disk with Chlorhexadine gluconate (CHG); Tape;Transparent 2/6/2019  9:40 AM  
Hub Color/Line Status Purple; Infusing;Flushed 2/6/2019  9:40 AM  
Positive Blood Return (Site #1) Yes 2/6/2019  9:40 AM  
Hub Color/Line Status Red;Capped;Flushed 2/6/2019  9:40 AM  
Positive Blood Return (Site #2) Yes 2/6/2019  9:40 AM  
Alcohol Cap Used Yes 2/6/2019  9:40 AM  
  
 
Opportunity for questions and clarification was provided. Reviewed BP concerns and meds given during procedure. Patient transported with: 
 Registered Nurse

## 2019-02-06 NOTE — PROGRESS NOTES
Bedside and Verbal shift change report given to CHAZ Fletcher (oncoming nurse) by Zak Deluca RN 
 (offgoing nurse). Report included the following information SBAR, Kardex, Intake/Output and MAR.

## 2019-02-06 NOTE — PROGRESS NOTES
Shift summary: Pt c/o constant severe pain to LLE, norco, toradol, and dilaudid given PRN. Pt stated that \"norco doesn't do anything\". NPO since midnight. Pt used CHG wipe to L foot, however, pt c/o much more pain afterwards. Covered open area to top of foot with 4x4s per pt request. Bath wipes used on rest of body due to pt's eczema. Unsteady gait seen with cane, walker given. Patient Vitals for the past 12 hrs: 
 Temp Pulse Resp BP SpO2  
02/06/19 0311 98.2 °F (36.8 °C) 88 16 166/88 97 % 02/05/19 2312 98.8 °F (37.1 °C) 78 18 (!) 162/95 98 % 02/05/19 1847 99.2 °F (37.3 °C) 76 18 (!) 154/97 100 %

## 2019-02-06 NOTE — PROGRESS NOTES
Hospitalist Progress Note-critical care note Patient: Kami Medina MRN: [id number]  CSN: [id number] YOB: 1960  Age: 62 y.o. Sex: male DOA: 2/4/2019 LOS:  LOS: 2 days Chief complaint: foot cellulitis. Htn. Smoker , cocaine abuse   
htn Assessment/Plan Hospital Problems  Date Reviewed: 6/2/2013 Codes Class Noted POA Cocaine abuse ([facility] [address] ICD-10-CM: F14.10 ICD-9-CM: 305.60  2/5/2019 Unknown * (Principal) Cellulitis of left foot ICD-10-CM: L03.116 ICD-9-CM: 682.7  2/4/2019 Unknown Hypertension ICD-10-CM: I10 
ICD-9-CM: 401.9  2/4/2019 Unknown Smoker ICD-10-CM: D17.372 ICD-9-CM: 305.1  2/4/2019 Unknown Pancreatitis chronic ICD-9-CM: 577.1  2/4/2019 Unknown Gout ICD-10-CM: M10.9 ICD-9-CM: 274.9  2/4/2019 Unknown PAD (peripheral artery disease) (HCC) ICD-10-CM: I73.9 ICD-9-CM: 443.9  2/4/2019 Unknown Alcohol use ICD-10-CM: Z78.9 ICD-9-CM: V49.89  2/4/2019 Hyponatremia ICD-10-CM: E87.1 ICD-9-CM: 276.1  2/4/2019 Unknown Foot cellulitis-left Improving Due to hardwire infection Dr. Suellen Mayen on board  
picc line placed with vanc and zosyn Pain control  
  
  
[facility] will have angiography today Need to stop smoking and cocaine use  
  
 HTN, accelerated Elevated due to pain , continue pain control  
 on lisinopril/norvasc, give one dose clonidine Continue home medication. 
  
Smoker Nicotine patch Educated pt  
  
Chronic pancreatitis Due to alcohol drinking Continue creon  
  
Hyponatremia Resolved per iv ns, will d/c fluid-start feeding now  
  Alcohol use On ciwa protocol , no withdrawal so far  
  
Cocaine abuse Subjective: foot pian, what the vascular wants to do Nurse: no acute issue Will increase iv dilaudid Disposition :tbd, Review of systems: 
 
General: No fevers or chills. Cardiovascular: No chest pain or pressure. No palpitations. Pulmonary: No shortness of breath. Gastrointestinal: No nausea, vomiting. Msk: foot pain Vital signs/Intake and Output: 
Visit Vitals /90 (BP 1 Location: Right arm, BP Patient Position: At rest) Pulse 75 Temp 98.8 °F (37.1 °C) Resp 16 Ht 5' 4\" (1.626 m) Wt 49.3 kg (108 lb 11.2 oz) SpO2 100% BMI 18.66 kg/m² Current Shift:  No intake/output data recorded. Last three shifts:  02/04 1901 - 02/06 0700 In: 9468 [P.O.:240; I.V.:1327] Out: 400 [Urine:400] Physical Exam: 
General: WD, WN. Alert, cooperative, no acute distress   
HEENT: NC, Atraumatic. PERRLA, anicteric sclerae. Lungs: CTA Bilaterally. No Wheezing/Rhonchi/Rales. Heart:  Regular  rhythm,  No murmur, No Rubs, No Gallops Abdomen: Soft, Non distended, Non tender.  +Bowel sounds, Extremities: No c/c. Left foot erythema /swelling better Psych:   Not anxious or agitated. Neurologic:  No acute neurological deficit. Labs: Results:  
   
Chemistry Recent Labs 02/06/19 
0120 02/05/19 
0243 02/04/19 
1217 * 77 103*  137 133* K 3.7 4.1 4.0  
* 104 98* CO2 26 26 28 BUN 12 11 10 CREA 0.78 0.86 0.80 CA 8.4* 8.8 9.1 AGAP 6 7 7 BUCR 15 13 13 * 151* 171* TP 6.2* 6.7 8.0 ALB 2.8* 3.1* 3.6 GLOB 3.4 3.6 4.4* AGRAT 0.8 0.9 0.8 CBC w/Diff Recent Labs 02/06/19 
0120 02/05/19 
2909 02/04/19 
1217 WBC 10.8 9.4 15.1*  
RBC 3.87* 4.43* 4.55* HGB 10.6* 12.2* 12.8* HCT 34.3* 39.0 40.0  321 351 GRANS 72 69 80* LYMPH 10* 12* 6*  
EOS 4 4 1 Cardiac Enzymes No results for input(s): CPK, CKND1, DOUGLAS in the last 72 hours. No lab exists for component: Milandreaa Lianne Coagulation Recent Labs 02/04/19 
1155 PTP 12.3 INR 0.9 Lipid Panel Lab Results Component Value Date/Time  Cholesterol, total 126 02/24/2014 09:52 AM  
 HDL Cholesterol 58 02/24/2014 09:52 AM  
 LDL, calculated 60.6 02/24/2014 09:52 AM  
 VLDL, calculated 7.4 02/24/2014 09:52 AM  
 Triglyceride 37 02/24/2014 09:52 AM  
 CHOL/HDL Ratio 2.2 02/24/2014 09:52 AM  
  
BNP No results for input(s): BNPP in the last 72 hours. Liver Enzymes Recent Labs 02/06/19 
0120 TP 6.2* ALB 2.8* * SGOT 11* Thyroid Studies Lab Results Component Value Date/Time TSH 0.84 02/24/2014 09:52 AM  
    
Procedures/imaging: see electronic medical records for all procedures/Xrays and details which were not copied into this note but were reviewed prior to creation of Plan Santos Tamayo MD

## 2019-02-06 NOTE — ROUTINE PROCESS
21  assumed care of pt after verbal report was given by off going nurse, pt off unit having angiogram done, will assess pt upon arrival to unit 1735 recved telephone report from cath holding nurse, will assess pt upon arrival to unit 
 
1802 rcved pt via bed from cath holding, vitals obtained, will monitor 1943 Bedside and Verbal shift change report given to Priti and SEGUNDO Khna RN (oncoming nurse) by [facility]). Report included the following information SBAR, Kardex and MAR.

## 2019-02-06 NOTE — PROGRESS NOTES
Problem: Falls - Risk of 
Goal: *Absence of Falls Document Zenia Fearing Fall Risk and appropriate interventions in the flowsheet. Outcome: Progressing Towards Goal 
Fall Risk Interventions: 
Mobility Interventions: Assess mobility with egress test 
 
  
 
Medication Interventions: Patient to call before getting OOB Elimination Interventions: Call light in reach History of Falls Interventions: Door open when patient unattended

## 2019-02-06 NOTE — PROGRESS NOTES
Pharmacy Dosing Services: Vancomycin Consult for Vancomycin Dosing by Pharmacy by Dr. Zetta Lesch provided for this 62y.o. year old male , for indication of Skin and Soft Tissue Infection. Day of Therapy 3 Vancomycin Trough= 12 mcg/ml at 0120 2/6/19 Goal Trough= 10-15 mcg/ml Continue with Vancomycin 750 mg IV every 12 hours Pharmacy to follow daily and will make changes to dose and/or frequency based on clinical status. Serum Creatinine Lab Results Component Value Date/Time Creatinine 0.78 02/06/2019 01:20 AM  
  
Creatinine Clearance Estimated Creatinine Clearance: 69.4 mL/min (based on SCr of 0.78 mg/dL). BUN Lab Results Component Value Date/Time BUN 12 02/06/2019 01:20 AM  
  
WBC Lab Results Component Value Date/Time WBC 10.8 02/06/2019 01:20 AM  
  
Temp 98.8 °F (37.1 °C)

## 2019-02-07 ENCOUNTER — ANESTHESIA EVENT (OUTPATIENT)
Dept: SURGERY | Age: 59
DRG: 477 | End: 2019-02-07
Payer: MEDICARE

## 2019-02-07 ENCOUNTER — ANESTHESIA (OUTPATIENT)
Dept: SURGERY | Age: 59
DRG: 477 | End: 2019-02-07
Payer: MEDICARE

## 2019-02-07 ENCOUNTER — APPOINTMENT (OUTPATIENT)
Dept: VASCULAR SURGERY | Age: 59
DRG: 477 | End: 2019-02-07
Attending: SURGERY
Payer: MEDICARE

## 2019-02-07 LAB
AMPHET UR QL SCN: NEGATIVE
ANION GAP SERPL CALC-SCNC: 5 MMOL/L (ref 3–18)
BARBITURATES UR QL SCN: NEGATIVE
BENZODIAZ UR QL: POSITIVE
BUN SERPL-MCNC: 6 MG/DL (ref 7–18)
BUN/CREAT SERPL: 8 (ref 12–20)
CALCIUM SERPL-MCNC: 8.3 MG/DL (ref 8.5–10.1)
CANNABINOIDS UR QL SCN: NEGATIVE
CHLORIDE SERPL-SCNC: 108 MMOL/L (ref 100–108)
CO2 SERPL-SCNC: 27 MMOL/L (ref 21–32)
COCAINE UR QL SCN: NEGATIVE
CREAT SERPL-MCNC: 0.79 MG/DL (ref 0.6–1.3)
GLUCOSE SERPL-MCNC: 100 MG/DL (ref 74–99)
HDSCOM,HDSCOM: ABNORMAL
MAGNESIUM SERPL-MCNC: 1.7 MG/DL (ref 1.6–2.6)
METHADONE UR QL: NEGATIVE
OPIATES UR QL: POSITIVE
PCP UR QL: NEGATIVE
POTASSIUM SERPL-SCNC: 3.7 MMOL/L (ref 3.5–5.5)
RIGHT DIST ATA VELOCITY: 91.2 CM/S
RIGHT PERONEAL DIST VELOCITY: 42.1 CM/S
RIGHT PERONEAL MID SYS PSV: 55.2 CM/S
RIGHT POP A PROX VEL RATIO: 0.93
RIGHT POPLITEAL DIST SYS PSV: 119.1 CM/S
RIGHT POPLITEAL PROX SYS PSV: 119.1 CM/S
RIGHT PTA MID SYS PSV: 19.4 CM/S
RIGHT SFA DIST VEL RATIO: 0.98
RIGHT SFA MID VEL RATIO: 1
RIGHT SUPER FEMORAL DIST SYS PSV: 128.4 CM/S
RIGHT SUPER FEMORAL MID SYS PSV: 131.3 CM/S
RIGHT SUPER FEMORAL PROX SYS PSV: 137.7 CM/S
SODIUM SERPL-SCNC: 140 MMOL/L (ref 136–145)

## 2019-02-07 PROCEDURE — 93005 ELECTROCARDIOGRAM TRACING: CPT

## 2019-02-07 PROCEDURE — 74011250636 HC RX REV CODE- 250/636

## 2019-02-07 PROCEDURE — 65270000029 HC RM PRIVATE

## 2019-02-07 PROCEDURE — 88307 TISSUE EXAM BY PATHOLOGIST: CPT

## 2019-02-07 PROCEDURE — 74011000250 HC RX REV CODE- 250

## 2019-02-07 PROCEDURE — 74011000258 HC RX REV CODE- 258: Performed by: HOSPITALIST

## 2019-02-07 PROCEDURE — 88311 DECALCIFY TISSUE: CPT

## 2019-02-07 PROCEDURE — 83735 ASSAY OF MAGNESIUM: CPT

## 2019-02-07 PROCEDURE — 74011250636 HC RX REV CODE- 250/636: Performed by: PODIATRIST

## 2019-02-07 PROCEDURE — 80307 DRUG TEST PRSMV CHEM ANLYZR: CPT

## 2019-02-07 PROCEDURE — 76210000006 HC OR PH I REC 0.5 TO 1 HR: Performed by: PODIATRIST

## 2019-02-07 PROCEDURE — 74011000250 HC RX REV CODE- 250: Performed by: PODIATRIST

## 2019-02-07 PROCEDURE — 87075 CULTR BACTERIA EXCEPT BLOOD: CPT

## 2019-02-07 PROCEDURE — 80048 BASIC METABOLIC PNL TOTAL CA: CPT

## 2019-02-07 PROCEDURE — 87186 SC STD MICRODIL/AGAR DIL: CPT

## 2019-02-07 PROCEDURE — 76010000138 HC OR TIME 0.5 TO 1 HR: Performed by: PODIATRIST

## 2019-02-07 PROCEDURE — 93926 LOWER EXTREMITY STUDY: CPT

## 2019-02-07 PROCEDURE — 74011250636 HC RX REV CODE- 250/636: Performed by: ANESTHESIOLOGY

## 2019-02-07 PROCEDURE — 87070 CULTURE OTHR SPECIMN AEROBIC: CPT

## 2019-02-07 PROCEDURE — 77030032490 HC SLV COMPR SCD KNE COVD -B: Performed by: PODIATRIST

## 2019-02-07 PROCEDURE — 74011250636 HC RX REV CODE- 250/636: Performed by: HOSPITALIST

## 2019-02-07 PROCEDURE — 87116 MYCOBACTERIA CULTURE: CPT

## 2019-02-07 PROCEDURE — 74011250637 HC RX REV CODE- 250/637: Performed by: HOSPITALIST

## 2019-02-07 PROCEDURE — 76060000032 HC ANESTHESIA 0.5 TO 1 HR: Performed by: PODIATRIST

## 2019-02-07 PROCEDURE — 87102 FUNGUS ISOLATION CULTURE: CPT

## 2019-02-07 PROCEDURE — 77030002986 HC SUT PROL J&J -A: Performed by: PODIATRIST

## 2019-02-07 PROCEDURE — 87077 CULTURE AEROBIC IDENTIFY: CPT

## 2019-02-07 PROCEDURE — 74011250636 HC RX REV CODE- 250/636: Performed by: INTERNAL MEDICINE

## 2019-02-07 RX ORDER — FENTANYL CITRATE 50 UG/ML
25 INJECTION, SOLUTION INTRAMUSCULAR; INTRAVENOUS AS NEEDED
Status: DISCONTINUED | OUTPATIENT
Start: 2019-02-07 | End: 2019-02-07 | Stop reason: HOSPADM

## 2019-02-07 RX ORDER — GLYCOPYRROLATE 0.2 MG/ML
INJECTION INTRAMUSCULAR; INTRAVENOUS AS NEEDED
Status: DISCONTINUED | OUTPATIENT
Start: 2019-02-07 | End: 2019-02-07 | Stop reason: HOSPADM

## 2019-02-07 RX ORDER — HALOPERIDOL 5 MG/ML
2 INJECTION INTRAMUSCULAR
Status: DISCONTINUED | OUTPATIENT
Start: 2019-02-07 | End: 2019-02-11 | Stop reason: HOSPADM

## 2019-02-07 RX ORDER — HYDROMORPHONE HYDROCHLORIDE 1 MG/ML
INJECTION, SOLUTION INTRAMUSCULAR; INTRAVENOUS; SUBCUTANEOUS AS NEEDED
Status: DISCONTINUED | OUTPATIENT
Start: 2019-02-07 | End: 2019-02-07 | Stop reason: HOSPADM

## 2019-02-07 RX ORDER — LORAZEPAM 2 MG/ML
3 INJECTION INTRAMUSCULAR
Status: DISCONTINUED | OUTPATIENT
Start: 2019-02-07 | End: 2019-02-11 | Stop reason: HOSPADM

## 2019-02-07 RX ORDER — SODIUM CHLORIDE 0.9 % (FLUSH) 0.9 %
5-40 SYRINGE (ML) INJECTION AS NEEDED
Status: DISCONTINUED | OUTPATIENT
Start: 2019-02-07 | End: 2019-02-07 | Stop reason: HOSPADM

## 2019-02-07 RX ORDER — SODIUM CHLORIDE 0.9 % (FLUSH) 0.9 %
5-40 SYRINGE (ML) INJECTION AS NEEDED
Status: DISCONTINUED | OUTPATIENT
Start: 2019-02-07 | End: 2019-02-11 | Stop reason: HOSPADM

## 2019-02-07 RX ORDER — DEXTROSE 50 % IN WATER (D50W) INTRAVENOUS SYRINGE
25-50 AS NEEDED
Status: CANCELLED | OUTPATIENT
Start: 2019-02-07

## 2019-02-07 RX ORDER — LORAZEPAM 1 MG/1
2 TABLET ORAL
Status: DISCONTINUED | OUTPATIENT
Start: 2019-02-07 | End: 2019-02-11 | Stop reason: HOSPADM

## 2019-02-07 RX ORDER — FENTANYL CITRATE 50 UG/ML
INJECTION, SOLUTION INTRAMUSCULAR; INTRAVENOUS AS NEEDED
Status: DISCONTINUED | OUTPATIENT
Start: 2019-02-07 | End: 2019-02-07 | Stop reason: HOSPADM

## 2019-02-07 RX ORDER — DIPHENHYDRAMINE HYDROCHLORIDE 50 MG/ML
12.5 INJECTION, SOLUTION INTRAMUSCULAR; INTRAVENOUS
Status: DISCONTINUED | OUTPATIENT
Start: 2019-02-07 | End: 2019-02-07 | Stop reason: HOSPADM

## 2019-02-07 RX ORDER — SODIUM CHLORIDE 0.9 % (FLUSH) 0.9 %
5-40 SYRINGE (ML) INJECTION EVERY 8 HOURS
Status: CANCELLED | OUTPATIENT
Start: 2019-02-07

## 2019-02-07 RX ORDER — LORAZEPAM 1 MG/1
1 TABLET ORAL
Status: DISCONTINUED | OUTPATIENT
Start: 2019-02-07 | End: 2019-02-11 | Stop reason: HOSPADM

## 2019-02-07 RX ORDER — ALBUTEROL SULFATE 0.83 MG/ML
2.5 SOLUTION RESPIRATORY (INHALATION) AS NEEDED
Status: CANCELLED | OUTPATIENT
Start: 2019-02-07

## 2019-02-07 RX ORDER — OXYCODONE HYDROCHLORIDE 5 MG/1
5 TABLET ORAL ONCE
Status: CANCELLED | OUTPATIENT
Start: 2019-02-07 | End: 2019-02-07

## 2019-02-07 RX ORDER — ONDANSETRON 2 MG/ML
4 INJECTION INTRAMUSCULAR; INTRAVENOUS ONCE
Status: CANCELLED | OUTPATIENT
Start: 2019-02-07 | End: 2019-02-07

## 2019-02-07 RX ORDER — FENTANYL CITRATE 50 UG/ML
25 INJECTION, SOLUTION INTRAMUSCULAR; INTRAVENOUS AS NEEDED
Status: CANCELLED | OUTPATIENT
Start: 2019-02-07

## 2019-02-07 RX ORDER — OXYCODONE HYDROCHLORIDE 5 MG/1
5 TABLET ORAL ONCE
Status: DISCONTINUED | OUTPATIENT
Start: 2019-02-07 | End: 2019-02-07 | Stop reason: HOSPADM

## 2019-02-07 RX ORDER — SODIUM CHLORIDE 0.9 % (FLUSH) 0.9 %
5-40 SYRINGE (ML) INJECTION EVERY 8 HOURS
Status: DISCONTINUED | OUTPATIENT
Start: 2019-02-07 | End: 2019-02-07 | Stop reason: HOSPADM

## 2019-02-07 RX ORDER — SODIUM CHLORIDE, SODIUM LACTATE, POTASSIUM CHLORIDE, CALCIUM CHLORIDE 600; 310; 30; 20 MG/100ML; MG/100ML; MG/100ML; MG/100ML
150 INJECTION, SOLUTION INTRAVENOUS CONTINUOUS
Status: CANCELLED | OUTPATIENT
Start: 2019-02-07

## 2019-02-07 RX ORDER — ONDANSETRON 2 MG/ML
INJECTION INTRAMUSCULAR; INTRAVENOUS AS NEEDED
Status: DISCONTINUED | OUTPATIENT
Start: 2019-02-07 | End: 2019-02-07 | Stop reason: HOSPADM

## 2019-02-07 RX ORDER — LABETALOL HCL 20 MG/4 ML
5 SYRINGE (ML) INTRAVENOUS
Status: DISCONTINUED | OUTPATIENT
Start: 2019-02-07 | End: 2019-02-07 | Stop reason: HOSPADM

## 2019-02-07 RX ORDER — PROPOFOL 10 MG/ML
INJECTION, EMULSION INTRAVENOUS AS NEEDED
Status: DISCONTINUED | OUTPATIENT
Start: 2019-02-07 | End: 2019-02-07 | Stop reason: HOSPADM

## 2019-02-07 RX ORDER — LORAZEPAM 2 MG/ML
1 INJECTION INTRAMUSCULAR
Status: DISCONTINUED | OUTPATIENT
Start: 2019-02-07 | End: 2019-02-11 | Stop reason: HOSPADM

## 2019-02-07 RX ORDER — NALOXONE HYDROCHLORIDE 0.4 MG/ML
0.1 INJECTION, SOLUTION INTRAMUSCULAR; INTRAVENOUS; SUBCUTANEOUS AS NEEDED
Status: CANCELLED | OUTPATIENT
Start: 2019-02-07

## 2019-02-07 RX ORDER — SODIUM CHLORIDE 0.9 % (FLUSH) 0.9 %
5-40 SYRINGE (ML) INJECTION AS NEEDED
Status: CANCELLED | OUTPATIENT
Start: 2019-02-07

## 2019-02-07 RX ORDER — ALBUTEROL SULFATE 0.83 MG/ML
2.5 SOLUTION RESPIRATORY (INHALATION) AS NEEDED
Status: DISCONTINUED | OUTPATIENT
Start: 2019-02-07 | End: 2019-02-07 | Stop reason: HOSPADM

## 2019-02-07 RX ORDER — DEXMEDETOMIDINE HYDROCHLORIDE 4 UG/ML
INJECTION, SOLUTION INTRAVENOUS AS NEEDED
Status: DISCONTINUED | OUTPATIENT
Start: 2019-02-07 | End: 2019-02-07 | Stop reason: HOSPADM

## 2019-02-07 RX ORDER — METOPROLOL TARTRATE 5 MG/5ML
5 INJECTION INTRAVENOUS ONCE
Status: DISCONTINUED | OUTPATIENT
Start: 2019-02-07 | End: 2019-02-07

## 2019-02-07 RX ORDER — SODIUM CHLORIDE 0.9 % (FLUSH) 0.9 %
5-40 SYRINGE (ML) INJECTION EVERY 8 HOURS
Status: DISCONTINUED | OUTPATIENT
Start: 2019-02-07 | End: 2019-02-11 | Stop reason: HOSPADM

## 2019-02-07 RX ORDER — SODIUM CHLORIDE, SODIUM LACTATE, POTASSIUM CHLORIDE, CALCIUM CHLORIDE 600; 310; 30; 20 MG/100ML; MG/100ML; MG/100ML; MG/100ML
150 INJECTION, SOLUTION INTRAVENOUS CONTINUOUS
Status: DISCONTINUED | OUTPATIENT
Start: 2019-02-07 | End: 2019-02-07 | Stop reason: HOSPADM

## 2019-02-07 RX ORDER — LORAZEPAM 2 MG/ML
2 INJECTION INTRAMUSCULAR
Status: DISCONTINUED | OUTPATIENT
Start: 2019-02-07 | End: 2019-02-11 | Stop reason: HOSPADM

## 2019-02-07 RX ORDER — NALOXONE HYDROCHLORIDE 0.4 MG/ML
0.1 INJECTION, SOLUTION INTRAMUSCULAR; INTRAVENOUS; SUBCUTANEOUS AS NEEDED
Status: DISCONTINUED | OUTPATIENT
Start: 2019-02-07 | End: 2019-02-07 | Stop reason: HOSPADM

## 2019-02-07 RX ORDER — ONDANSETRON 2 MG/ML
4 INJECTION INTRAMUSCULAR; INTRAVENOUS ONCE
Status: DISCONTINUED | OUTPATIENT
Start: 2019-02-07 | End: 2019-02-07 | Stop reason: HOSPADM

## 2019-02-07 RX ORDER — SODIUM CHLORIDE, SODIUM LACTATE, POTASSIUM CHLORIDE, CALCIUM CHLORIDE 600; 310; 30; 20 MG/100ML; MG/100ML; MG/100ML; MG/100ML
150 INJECTION, SOLUTION INTRAVENOUS CONTINUOUS
Status: DISCONTINUED | OUTPATIENT
Start: 2019-02-07 | End: 2019-02-09

## 2019-02-07 RX ORDER — MAGNESIUM SULFATE 100 %
4 CRYSTALS MISCELLANEOUS AS NEEDED
Status: CANCELLED | OUTPATIENT
Start: 2019-02-07

## 2019-02-07 RX ORDER — MAGNESIUM SULFATE 100 %
4 CRYSTALS MISCELLANEOUS AS NEEDED
Status: DISCONTINUED | OUTPATIENT
Start: 2019-02-07 | End: 2019-02-07 | Stop reason: HOSPADM

## 2019-02-07 RX ORDER — DEXTROSE 50 % IN WATER (D50W) INTRAVENOUS SYRINGE
25-50 AS NEEDED
Status: DISCONTINUED | OUTPATIENT
Start: 2019-02-07 | End: 2019-02-07 | Stop reason: HOSPADM

## 2019-02-07 RX ORDER — KETAMINE HYDROCHLORIDE 10 MG/ML
INJECTION, SOLUTION INTRAMUSCULAR; INTRAVENOUS AS NEEDED
Status: DISCONTINUED | OUTPATIENT
Start: 2019-02-07 | End: 2019-02-07 | Stop reason: HOSPADM

## 2019-02-07 RX ORDER — DIPHENHYDRAMINE HYDROCHLORIDE 50 MG/ML
12.5 INJECTION, SOLUTION INTRAMUSCULAR; INTRAVENOUS
Status: CANCELLED | OUTPATIENT
Start: 2019-02-07

## 2019-02-07 RX ORDER — LIDOCAINE HYDROCHLORIDE 20 MG/ML
INJECTION, SOLUTION EPIDURAL; INFILTRATION; INTRACAUDAL; PERINEURAL AS NEEDED
Status: DISCONTINUED | OUTPATIENT
Start: 2019-02-07 | End: 2019-02-07 | Stop reason: HOSPADM

## 2019-02-07 RX ORDER — MIDAZOLAM HYDROCHLORIDE 1 MG/ML
INJECTION, SOLUTION INTRAMUSCULAR; INTRAVENOUS AS NEEDED
Status: DISCONTINUED | OUTPATIENT
Start: 2019-02-07 | End: 2019-02-07 | Stop reason: HOSPADM

## 2019-02-07 RX ADMIN — HYDRALAZINE HYDROCHLORIDE 10 MG: 20 INJECTION INTRAMUSCULAR; INTRAVENOUS at 00:17

## 2019-02-07 RX ADMIN — HYDROMORPHONE HYDROCHLORIDE 2 MG: 1 INJECTION, SOLUTION INTRAMUSCULAR; INTRAVENOUS; SUBCUTANEOUS at 05:30

## 2019-02-07 RX ADMIN — KETAMINE HYDROCHLORIDE 20 MG: 10 INJECTION, SOLUTION INTRAMUSCULAR; INTRAVENOUS at 17:52

## 2019-02-07 RX ADMIN — LABETALOL HYDROCHLORIDE 5 MG: 5 INJECTION, SOLUTION INTRAVENOUS at 18:48

## 2019-02-07 RX ADMIN — AMLODIPINE BESYLATE 10 MG: 5 TABLET ORAL at 12:53

## 2019-02-07 RX ADMIN — MIDAZOLAM HYDROCHLORIDE 2 MG: 1 INJECTION, SOLUTION INTRAMUSCULAR; INTRAVENOUS at 17:38

## 2019-02-07 RX ADMIN — Medication 10 ML: at 22:45

## 2019-02-07 RX ADMIN — DEXMEDETOMIDINE HYDROCHLORIDE 16 MCG: 4 INJECTION, SOLUTION INTRAVENOUS at 18:01

## 2019-02-07 RX ADMIN — HYDROMORPHONE HYDROCHLORIDE 2 MG: 1 INJECTION, SOLUTION INTRAMUSCULAR; INTRAVENOUS; SUBCUTANEOUS at 08:44

## 2019-02-07 RX ADMIN — SODIUM CHLORIDE, SODIUM LACTATE, POTASSIUM CHLORIDE, AND CALCIUM CHLORIDE 150 ML/HR: 600; 310; 30; 20 INJECTION, SOLUTION INTRAVENOUS at 22:09

## 2019-02-07 RX ADMIN — ACETAMINOPHEN 650 MG: 325 TABLET ORAL at 00:08

## 2019-02-07 RX ADMIN — Medication 10 ML: at 13:05

## 2019-02-07 RX ADMIN — DOCUSATE SODIUM 100 MG: 100 CAPSULE, LIQUID FILLED ORAL at 22:25

## 2019-02-07 RX ADMIN — HYDROMORPHONE HYDROCHLORIDE 1 MG: 1 INJECTION, SOLUTION INTRAMUSCULAR; INTRAVENOUS; SUBCUTANEOUS at 17:58

## 2019-02-07 RX ADMIN — VANCOMYCIN HYDROCHLORIDE 750 MG: 10 INJECTION, POWDER, LYOPHILIZED, FOR SOLUTION INTRAVENOUS at 22:24

## 2019-02-07 RX ADMIN — VANCOMYCIN HYDROCHLORIDE 750 MG: 10 INJECTION, POWDER, LYOPHILIZED, FOR SOLUTION INTRAVENOUS at 07:07

## 2019-02-07 RX ADMIN — SODIUM CHLORIDE, SODIUM LACTATE, POTASSIUM CHLORIDE, AND CALCIUM CHLORIDE: 600; 310; 30; 20 INJECTION, SOLUTION INTRAVENOUS at 17:34

## 2019-02-07 RX ADMIN — Medication 10 ML: at 22:24

## 2019-02-07 RX ADMIN — KETOROLAC TROMETHAMINE 15 MG: 30 INJECTION, SOLUTION INTRAMUSCULAR at 16:13

## 2019-02-07 RX ADMIN — KETOROLAC TROMETHAMINE 15 MG: 30 INJECTION, SOLUTION INTRAMUSCULAR at 10:21

## 2019-02-07 RX ADMIN — PIPERACILLIN SODIUM,TAZOBACTAM SODIUM 3.38 G: 3; .375 INJECTION, POWDER, FOR SOLUTION INTRAVENOUS at 12:59

## 2019-02-07 RX ADMIN — PIPERACILLIN SODIUM,TAZOBACTAM SODIUM 3.38 G: 3; .375 INJECTION, POWDER, FOR SOLUTION INTRAVENOUS at 05:30

## 2019-02-07 RX ADMIN — FENTANYL CITRATE 100 MCG: 50 INJECTION, SOLUTION INTRAMUSCULAR; INTRAVENOUS at 17:43

## 2019-02-07 RX ADMIN — SODIUM CHLORIDE 75 ML/HR: 900 INJECTION, SOLUTION INTRAVENOUS at 10:04

## 2019-02-07 RX ADMIN — LIDOCAINE HYDROCHLORIDE 100 MG: 20 INJECTION, SOLUTION EPIDURAL; INFILTRATION; INTRACAUDAL; PERINEURAL at 17:43

## 2019-02-07 RX ADMIN — TRIAMCINOLONE ACETONIDE: 1 CREAM TOPICAL at 08:57

## 2019-02-07 RX ADMIN — HYDROCODONE BITARTRATE AND ACETAMINOPHEN 1 TABLET: 5; 325 TABLET ORAL at 12:52

## 2019-02-07 RX ADMIN — KETAMINE HYDROCHLORIDE 50 MG: 10 INJECTION, SOLUTION INTRAMUSCULAR; INTRAVENOUS at 17:55

## 2019-02-07 RX ADMIN — HEPARIN SODIUM 5000 UNITS: 5000 INJECTION INTRAVENOUS; SUBCUTANEOUS at 22:23

## 2019-02-07 RX ADMIN — HYDROMORPHONE HYDROCHLORIDE 2 MG: 1 INJECTION, SOLUTION INTRAMUSCULAR; INTRAVENOUS; SUBCUTANEOUS at 01:07

## 2019-02-07 RX ADMIN — GLYCOPYRROLATE 0.2 MG: 0.2 INJECTION INTRAMUSCULAR; INTRAVENOUS at 17:52

## 2019-02-07 RX ADMIN — PANCRELIPASE LIPASE, PANCRELIPASE PROTEASE, PANCRELIPASE AMYLASE 1 CAPSULE: 10000; 32000; 42000 CAPSULE, DELAYED RELEASE ORAL at 22:24

## 2019-02-07 RX ADMIN — PROPOFOL 100 MG: 10 INJECTION, EMULSION INTRAVENOUS at 17:43

## 2019-02-07 RX ADMIN — KETAMINE HYDROCHLORIDE 30 MG: 10 INJECTION, SOLUTION INTRAMUSCULAR; INTRAVENOUS at 17:43

## 2019-02-07 RX ADMIN — ONDANSETRON 4 MG: 2 INJECTION INTRAMUSCULAR; INTRAVENOUS at 17:40

## 2019-02-07 RX ADMIN — PANCRELIPASE LIPASE, PANCRELIPASE PROTEASE, PANCRELIPASE AMYLASE 1 CAPSULE: 10000; 32000; 42000 CAPSULE, DELAYED RELEASE ORAL at 00:08

## 2019-02-07 NOTE — PROGRESS NOTES
Pt underwent hardware removal and bone biopsy. Blood flow was poor and prognosis regarding limb salvage is poor. Recommend local wound care for now. DC home on IV abx and I can follow as an out patient.

## 2019-02-07 NOTE — PERIOP NOTES
TRANSFER - IN REPORT: 
 
Verbal report received from Eduardo PERSAUD CRNA(name) on [employer]  being received from OR(unit) for routine progression of care Report consisted of patients Situation, Background, Assessment and  
Recommendations(SBAR). Information from the following report(s) SBAR, OR Summary, Procedure Summary, Intake/Output and MAR was reviewed with the receiving nurse. Opportunity for questions and clarification was provided. Assessment completed upon patients arrival to unit and care assumed.

## 2019-02-07 NOTE — NURSE NAVIGATOR
Spoke with CHAZ Newton  To make sure pre-op checklist is completed and to have EKG done. Order has been placed.

## 2019-02-07 NOTE — INTERVAL H&P NOTE
H&P Update: 
Arlet Munguia was seen and examined. History and physical has been reviewed. The patient has been examined.  There have been no significant clinical changes since the completion of the originally dated History and Physical. 
 
Signed By: Isabel Owen DPM   
 February 7, 2019 5:21 PM

## 2019-02-07 NOTE — PROGRESS NOTES
NUTRITION FOLLOW-UP 
RECOMMENDATIONS/PLAN:  
- Diet: Adv diet per MD 
- Avoid prolonged NPO diet order as this does not meet estimated needs Monitor labs/lytes, diet adv, skin integrity, wt, fluid status, BM Adult Malnutrition Criteria:  
 
 Nutrition assessment was completed by RDDEVAUGHN and the patient was found to meet the following malnutrition criteria established by ASPEN/AND: 
 
Adult Malnutrition Guidelines: SEVERE PROTEIN CALORIE MALNUTRITION IN THE CONTEXT OF CHRONIC ILLNESS Energy Intake: <75% energy intake compared to estimated energy needs >1 month Body Fat:  Severe Depletion Muscle Mass: Severe Depletion Riya Brock 
02/07/19 NUTRITION ASSESSMENT:  
Client Update: 62 yrs old Male with cellulitis, PAD, HTN, hx of cocaine abuse and smoking, chronic pancreatitis, hyponatremia,  s/p L popliteal stenting and AT angioplasty. NFPE: clavicle showing and temporal wasting FOOD/NUTRITION INTAKE Diet Order:  NPO Food Allergies: NKFA Average PO Intake:     
Patient Vitals for the past 100 hrs: 
 % Diet Eaten 02/06/19 2004 80 % 02/05/19 1509 75 % Pertinent Medications:  [x] Reviewed; folic acid, zenpep, thiamine Electrolyte Replacement Protocol: []K []Mg []PO4 Insulin:  []SSI  []Pre-meal   []Basal    []Drip  []None Cultural/Yarsani Food Preferences: None Identified BIOCHEMICAL DATA & MEDICAL TESTS Pertinent Labs:  [x] Reviewed; ANTHROPOMETRICS Height: 5' 4\" (162.6 cm)       Weight: 44.8 kg (98 lb 12.3 oz) BMI: 16.9 kg/m^2 underweight (Less than or = to 18.5% BMI) Adm Weight: 110 lbs                Weight change: -12lbs ? ? Accuracy of adm wt Adjusted Body Weight: n/a NUTRITION-FOCUSED PHYSICAL ASSESSMENT Skin: No PU     
GI: +BM 2/4/19 NUTRITION PRESCRIPTION Calories: 8181-5727 kcal/day based on 35-40kcal/kg Protein: 60-75 g/day based on 1.2-1.5 g/kg Fluid: 6648-0185 ml/day based on 1 kcal/ml NUTRITION DIAGNOSES:  
 1. At risk for inadequate oral intake related to diet order as evidence by NPO diet NUTRITION INTERVENTIONS:  
INTERVENTIONS:        GOALS: 
1. Diet: Adv diet per MD 1. Adv diet per MD by next review 4 days LEARNING NEEDS (Diet, Supplementation, Food/Nutrient-Drug Interaction): 
 [] None Identified   [] Education provided/documented      Identified and patient: [] Declined   [x] Was not appropriate/indicated NUTRITION MONITORING /EVALUATION:  
Monitor wt Monitor renal labs, electrolytes, fluid status Previous Recommendations Implemented: no       
Previous Goals Met:  no -pt NPO had surgery  
   
[] Participated in Interdisciplinary Rounds   
[x] [address] [place] [address] Reviewed DISCHARGE NUTRITION RECOMMENDATIONS ADDRESSED:  
  
  [x] To be determined closer to discharge NUTRITION RISK:           [x] At risk                        [] Not currently at risk Will follow-up per policy. Yolande Hall 9

## 2019-02-07 NOTE — PROGRESS NOTES
DC Plan: Discharge home with [facility], once medically stable Chart reviewed. Per MD Jerry Corona may discharge in 1-3 days. Met with pt at bedside. PT is recommending [facility] v rehab. Pt refuses rehab. FOC offered for [facility]. Pt chose [facility]. Referral will be placed. Pt states his wife will take him home at discharge. Provider, please advise if pt will require long term IV abx at discharge. CM will cont to follow.

## 2019-02-07 NOTE — ANESTHESIA PREPROCEDURE EVALUATION
Anesthetic History No history of anesthetic complications Review of Systems / Medical History Patient summary reviewed, nursing notes reviewed and pertinent labs reviewed Pulmonary Within defined limits Neuro/Psych Within defined limits Cardiovascular Hypertension Exercise tolerance: >4 METS 
  
GI/Hepatic/Renal 
Within defined limits Endo/Other Within defined limits Other Findings Physical Exam 
 
Airway Mallampati: II 
TM Distance: 4 - 6 cm Neck ROM: normal range of motion Cardiovascular Regular rate and rhythm,  S1 and S2 normal,  no murmur, click, rub, or gallop Dental 
 
Dentition: Poor dentition and Full upper dentures Comments: Broken upper dentures Pulmonary Breath sounds clear to auscultation Abdominal 
GI exam deferred Other Findings Anesthetic Plan ASA: 2 Anesthesia type: general 
 
 
 
 
Induction: Intravenous Anesthetic plan and risks discussed with: Patient

## 2019-02-07 NOTE — PROGRESS NOTES
Problem: Falls - Risk of 
Goal: *Absence of Falls Document Morales Garcia Fall Risk and appropriate interventions in the flowsheet. Outcome: Progressing Towards Goal 
Fall Risk Interventions: 
Mobility Interventions: Bed/chair exit alarm, Patient to call before getting OOB Medication Interventions: Bed/chair exit alarm Elimination Interventions: Call light in reach, Urinal in reach History of Falls Interventions: Door open when patient unattended

## 2019-02-07 NOTE — PROGRESS NOTES
Received patient alert and oriented x4. Left Brachial PICC line clean dry & intact. Left Extremity +2 pitting edema, positive pedal pulse via Doppler device. 2230 patient bleeding from groin area, per patient from shaving the night before angiogram.  
 
2331 /95 HR 82 
0012 gave hydralazine 10mg IV 
0057 /70 Pre-op checklist partially completed. No estimated time for procedure, pt been NPO since midnight, last dose of heparin held. Pt. Refused CHG bath but agreed to use the bath wipes instead.

## 2019-02-07 NOTE — PERIOP NOTES
Left foot toes cold on touch, cap refill >3 sec, dressing CDI. Dr Gurmeet Jimenez notified, no new orders received.

## 2019-02-07 NOTE — ROUTINE PROCESS
Bedside and Verbal shift change report given to Marla Rojas. Moose Castellano (oncoming nurse) by Gerhard Cowan (offgoing nurse). Report included the following information SBAR, Kardex, Intake/Output, MAR and Alarm Parameters .

## 2019-02-07 NOTE — PROGRESS NOTES
VASCULAR SURGERY DAILY PROGRESS NOTE ASSESSMENT AND PLAN: 
62 y.o. male POD 1 s/p L popliteal stenting and AT angioplasty by Dr. Kavya Sampson. Access site clean, no hematoma, but with prominent pulse and tenderness to palpation 
 
- will obtain R groin duplex today to r/o pseudoaneurysm 
- pain control. Increased pain May be partially related to reperfusion 
- from vascular perspective, ok to proceed with hardware removal by podiatry SUBJECTIVE: 
Complains of severe pain in his left foot. OBJECTIVE: 
Hemodynamically stable Basic Metabolic Profile Lab Results Component Value Date  02/07/2019 CO2 27 02/07/2019 BUN 6 (L) 02/07/2019 PEx: 
Afebrile, VSS 
NAD, A&Ox3 LLE wwp. There are doppler signals in both DP and PT. Unable to palpate popliteal pulse R groin soft, no hematoma. Prominent pulse and tender to palpation. Zee Darling MD PhD 
[facility] Tel [phone] Pager [phone]

## 2019-02-07 NOTE — ROUTINE PROCESS
Bedside and Verbal shift change report given to Jian Vidal RN (oncoming nurse) by Modesto Klinefelter, RN 
 (offgoing nurse).  Report included the following information SBAR, OR Summary, Procedure Summary, Intake/Output, MAR, Recent Results, Med Rec Status and Pre Procedure Checklist.

## 2019-02-07 NOTE — PROGRESS NOTES
Problem: Mobility Impaired (Adult and Pediatric) Goal: *Acute Goals and Plan of Care (Insert Text) Physical Therapy Goals Initiated 2/5/2019 and to be accomplished within 4 day(s) 1. Patient will move from supine <> sit with independence in prep for out of bed activity and change of position. 2.  Patient will perform sit<> stand with mod independence with LRAD in prep for transfers/ambulation. 3.  Patient will transfer from bed <> chair with mod independence with LRAD for time up in chair for completion of ADL activity. 4.  Patient will ambulate 150 feet with modified for improved functional mobility/safe discharge. 5.  Patient will ascend/descend 3-5 stairs with handrail(s) with minimal assistance/contact guard assist for home re-entry as needed. 2/7/2019  PT treatment not completed due to: 
[x] Off Unit for testing/procedure - at OR 
[] Pain 
[] Eating 
[] Patient too lethargic 
[] Nausea/vomiting 
[] Dialysis treatment in progress  
[x] Other: Will f/u tomorrow as appropriate. Thank you.   
Ricardo Morales, PT

## 2019-02-07 NOTE — OP NOTES
[facility]  OPERATIVE REPORT    Name:  Rebel Ivy  MR#:   [id number]  :  1960  ACCOUNT #:  [de-identified]  DATE OF SERVICE:  2019       PREOPERATIVE DIAGNOSIS:  Left foot ulcer. POSTOPERATIVE DIAGNOSIS:  Left foot ulcer. PROCEDURES PERFORMED:  1. Left lower extremity arteriogram.  2.  Percutaneous thrombectomy of below-the-knee popliteal  artery. 3.  Percutaneous transluminal angioplasty with stent  placement below-the-knee popliteal artery. 4.  Percutaneous transluminal angioplasty of above-knee  popliteal artery. 5.  Percutaneous transluminal angioplasty of left anterior  tibial artery and dorsalis pedis artery. SURGEON:  Chante Coughlin MD    ASSISTANT:    ANESTHESIA:  Moderate sedation. The patient underwent  continuous cardiovascular monitoring. I supervised the  entire procedure. Sedation was administered by Anais Garrido R.N.    COMPLICATIONS:  None. SPECIMENS REMOVED:    IMPLANTS:    ESTIMATED BLOOD LOSS:  20 mL. INDICATIONS FOR PROCEDURE:  The patient is a 25-year-old  male with severe peripheral arterial disease, status post  femoral-femoral bypass, this is actually a left to right  femoral-femoral bypass, and by report, a left fem-pop  bypass. His noninvasive study revealed rather severe tibial  disease in a flat digital wave form in the first digit and  he presents now for angiography. OPERATIVE FINDINGS:  The below-the-knee femoral-femoral  bypass appeared widely patent, as was the left common  femoral artery and profunda femoris artery. The SFA was  patent throughout its course with the proximal aspect of the  above-the-knee popliteal artery with marked stenosis about  50% or 60%. Below-the-knee popliteal artery was occluded  with reconstitution. This appeared to be a combination of  underlying atherosclerotic disease as well as subacute  thrombus. The anterior tibial artery was the only vessel  patent proximally.   It occluded in the distal third of the  leg. There was a collateral which filled the peroneal  artery which did then collateralize into the posterior  tibial artery at the ankle. The posterior tibial artery at  the ankle was patent, but small in caliber approximately 1.5  mm in diameter. PROCEDURE:  Having obtained prior consent, the patient was  brought to the angiogram suite and placed in supine  position. He was prepped and draped in sterile fashion. Appropriate time-out with [place] protocol was performed. A 1% lidocaine without epinephrine was used to anesthetize  the skin and the subcutaneous tissue overlying the  femoral-femoral bypass of the right groin. I cannulated  this in a retrograde fashion and placed the 4-Spanish sheath,  and then through the 4-Spanish sheath, performed angiography  which revealed flow through the fem-fem bypass from the left  side to the right side with patency of the common femoral  artery, profunda femoris, and superficial femoral arteries  in the right groin. We then advanced the angled Glidewire  into the proximal SFA on the left side and advanced a [place]  catheter over the wire and then performed selective left  lower extremity arteriogram revealing the SFA to be patent  with stenosis in the above-the-knee popliteal artery just  beyond the adductor canal; occlusion of the below-the-knee  popliteal artery and its runoff as described above. I then  elected to intervene. We initially worked through the  4-Spanish sheath. I was able to cross below-the-knee  popliteal artery occlusion with a stiff-angled Glidewire. This was with relative minimal resistance and it was  confirmed that there was underlying thrombus in this vessel. The origin of the tibioperoneal trunk was occluded and this  was a flush occlusion. The anterior tibial artery was  patent proximally as stated.   Initially, I selected to  cannulate the anterior tibial artery and performed selective  arteriogram which did not reveal any distal reconstitution  of the anterior tibial artery. I did advance a wire into  the more distal aspect of the anterior tibial artery at the  level of the ankle initially and then I performed on-table  angiogram which revealed again occlusion of the anterior  tibial artery, some visualization of the vessel itself which  was critically and severely diseased as well as occluded  without distal reconstitution. At this point, then I  elected to attempt to recannulize the tibioperoneal trunk. I would reverse both the V18 and the stiff-angled Glidewire  and eventually got the 0.035 stiff-angled Glidewire to  engage the origin of the tibioperoneal trunk. We advanced  Trailblazer catheter into the proximal tibioperoneal trunk  and then reversed the wire and I did traverse some of the  tibioperoneal trunk into the peroneal artery with a  stiff-angled Glidewire and met some resistance within the  stiff-angled Glidewire with continued prolapse upon itself  and appeared to go into a branch of the peroneal artery. Then, I switched out to a V-18 ControlWire. First, I  attempted traversement of the vessel with the tip of the  wire and then I actually prolapsed the wire and the floppy  tip of the wire. I was able to enter the subintimal plane  of the tibioperoneal trunk to the tunnel of the distal third  of the leg, but did not re-enter the true lumen of the  artery as it was diffusely diseased. I then abandoned that  approach, once again cannulated the anterior tibial artery,  advanced the wire all the way to the foot until the pedal  arch and then angioplastied the anterior tibial artery and  the dorsalis pedis artery. I then performed followup  angiogram which still revealed patency of the anterior  tibial artery proximally, but not distally at the ankle. It  should be noted that the patient was given a heparin bolus  earlier in the procedure 5000 units. We then performed an  atherectomy.   We upsized the groin sheath to 6-Scottish and  then performed atherectomy with a CAT6 device of the below  the knee popliteal artery and this established patency in it  and then there was still a luminal defect in the  below-the-knee popliteal artery and I stented this with a 6  x 40 Absolute Pro Stent and angioplastied the above-the-knee  popliteal artery and the below-the-knee popliteal artery  with a 4 mm balloon with a very nice angiographic result. This was accepted. I once again angioplastied the anterior  tibial artery in an attempt to establish actual patency into  the foot through the anterior tibial artery and dorsalis  pedis artery, but this remained occluded distally. Collaterals continued to fill the peroneal artery and then  collateralize into the posterior tibial artery at the level  of the ankle. Subsequently, then the 6-Scottish sheath was  removed from the groin. The bypass was very superficial in  the groin. We placed a pursestring suture around the sheath  site, removed the sheath and held pressure for approximately  10 minutes until excellent hematosis was obtained in the  right groin. The patient did tolerate the procedure well.       Richard Chery MD      [id number]  D:  02/06/2019 16:16  T:  02/07/2019 8:30  JOB #:  [id number]

## 2019-02-07 NOTE — BRIEF OP NOTE
BRIEF OPERATIVE NOTE Date of Procedure: 2/7/2019 Preoperative Diagnosis: CELLUSITIS OF LEFT FOOT Postoperative Diagnosis: * No post-op diagnosis entered * Procedure(s): 
HARDWARE REMOVAL OF LEFT FOOT PARAGON SCREWS WITH C-ARM Surgeon(s) and Role: * Janett Raygoza DPM - Primary Surgical Assistant: None Surgical Staff: 
Circ-1: Diomedes Figueroa RN Scrub Tech-1: Peggyann Libman Scrub Tech-2: Neda Calabrese Event Time In Time Out Incision Start 02/07/2019 1754 Incision Close 02/07/2019 1808 Anesthesia: General  
Estimated Blood Loss: None Specimens: * No specimens in log * Findings: Minimal blood flow Complications: None Implants: * No implants in log *

## 2019-02-07 NOTE — PROGRESS NOTES
0745: Received report from Brennan Hogue RN. Assumed pt care at this time. Pt is currently on NPO, going to have left foot procedure per night shift. 0: Spoke with Vascular, they said would get pt down to have a duplex done. 8068: Assessment completed. Patient is A&OX4. Patient is calm and cooperative. Patient's oral mucosa pink and moist, strong  on both upper extremities. Lung sound clear, not appear distress. Bowel sounds hypoactive. Pedal pulses is not papable to right feet, able to access pulse with Doppler. no complain of any numbness or tingling. IV site dry and dressing intact. Stiches open to air to left toe is clean and dry, pedal pulse is palpable. 2X2 and transparent dressing to the right groin area is clean, no drainage note. Cap refill both legs <3s. Pain is 9/10. PRN pain medication is Dilaudid. , will continue assess pt's pain level. bed is in lower position, call bell and personal items are within reach. Pain regimen and activities are educated, educated pt to call when getting up to prevent fall. Pt verbalized understanding. Discoloration to the skin, pt stated that he had eczema.

## 2019-02-07 NOTE — PROGRESS NOTES
Hospitalist Progress Note-critical care note Patient: Sherice Saravia MRN: [id number]  CSN: [id number] YOB: 1960  Age: 62 y.o. Sex: male DOA: 2/4/2019 LOS:  LOS: 3 days Chief complaint: foot cellulitis. Htn. Smoker , cocaine abuse   
htn Assessment/Plan Hospital Problems  Date Reviewed: 6/2/2013 Codes Class Noted POA Cocaine abuse ([facility] [address] ICD-10-CM: F14.10 ICD-9-CM: 305.60  2/5/2019 Unknown * (Principal) Cellulitis of left foot ICD-10-CM: L03.116 ICD-9-CM: 682.7  2/4/2019 Unknown Hypertension ICD-10-CM: I10 
ICD-9-CM: 401.9  2/4/2019 Unknown Smoker ICD-10-CM: E49.671 ICD-9-CM: 305.1  2/4/2019 Unknown Pancreatitis chronic ICD-9-CM: 577.1  2/4/2019 Unknown Gout ICD-10-CM: M10.9 ICD-9-CM: 274.9  2/4/2019 Unknown PAD (peripheral artery disease) (HCC) ICD-10-CM: I73.9 ICD-9-CM: 443.9  2/4/2019 Unknown Alcohol use ICD-10-CM: Z78.9 ICD-9-CM: V49.89  2/4/2019 Hyponatremia ICD-10-CM: E87.1 ICD-9-CM: 276.1  2/4/2019 Unknown Foot cellulitis-left Improving Due to hardwire infection-will have hardwire removed today Dr. Selma Hudson on board  
picc line placed -on  vanc and zosyn Pain control  
  
  
Pad Left popliteal thrombectomy/pta/stent, left MISAEL pta per Dr. Mckenzie Kaur , tolerated well.  
 
  
 HTN, accelerated Need norvasc for today  
continue pain control Continue home medication. 
  
Smoker Nicotine patch Educated pt  
  
Chronic pancreatitis Due to alcohol drinking Continue creon  
  
Hyponatremia Resolved  
  
Alcohol use On ciwa protocol , no withdrawal so far  
  
Cocaine abuse Subjective: foot pain,  
Nurse: no acute issue , will have procedure at 5 Vascular study no pseudoaneurysm Family was at the bedside. Disposition :tbd, Review of systems: 
 
General: No fevers or chills. Cardiovascular: No chest pain or pressure. No palpitations. Pulmonary: No shortness of breath. Gastrointestinal: No nausea, vomiting. Msk: foot pain Vital signs/Intake and Output: 
Visit Vitals /84 (BP 1 Location: Right arm) Pulse 82 Temp 98.1 °F (36.7 °C) Resp 18 Ht 5' 4\" (1.626 m) Wt 44.8 kg (98 lb 12.3 oz) SpO2 100% BMI 16.95 kg/m² Current Shift:  02/07 0701 - 02/07 1900 In: -  
Out: 275 [Urine:275] Last three shifts:  02/05 1901 - 02/07 0700 In: 5668 [P.O.:120; I.V.:1427] Out: 2005 [Urine:2005] Physical Exam: 
General: WD, WN. Alert, cooperative, no acute distress   
HEENT: NC, Atraumatic. PERRLA, anicteric sclerae. Lungs: CTA Bilaterally. No Wheezing/Rhonchi/Rales. Heart:  Regular  rhythm,  No murmur, No Rubs, No Gallops Abdomen: Soft, Non distended, Non tender.  +Bowel sounds, Extremities: No c/c. Left foot erythema /swelling better Psych:   Not anxious or agitated. Neurologic:  No acute neurological deficit. Labs: Results:  
   
Chemistry Recent Labs 02/07/19 
0492 02/06/19 
0120 02/05/19 
6159 * 103* 77  141 137  
K 3.7 3.7 4.1  109* 104 CO2 27 26 26 BUN 6* 12 11 CREA 0.79 0.78 0.86 CA 8.3* 8.4* 8.8 AGAP 5 6 7 BUCR 8* 15 13 AP  --  134* 151* TP  --  6.2* 6.7 ALB  --  2.8* 3.1*  
GLOB  --  3.4 3.6 AGRAT  --  0.8 0.9  
  
CBC w/Diff Recent Labs 02/06/19 
0120 02/05/19 
1617 WBC 10.8 9.4  
RBC 3.87* 4.43* HGB 10.6* 12.2* HCT 34.3* 39.0  321 GRANS 72 69 LYMPH 10* 12* EOS 4 4 Cardiac Enzymes No results for input(s): CPK, CKND1, DOUGLAS in the last 72 hours. No lab exists for component: Branda Cork Coagulation No results for input(s): PTP, INR, APTT in the last 72 hours. No lab exists for component: INREXT, INREXT Lipid Panel Lab Results Component Value Date/Time  Cholesterol, total 126 02/24/2014 09:52 AM  
 HDL Cholesterol 58 02/24/2014 09:52 AM  
 LDL, calculated 60.6 02/24/2014 09:52 AM  
 VLDL, calculated 7.4 02/24/2014 09:52 AM  
 Triglyceride 37 02/24/2014 09:52 AM  
 CHOL/HDL Ratio 2.2 02/24/2014 09:52 AM  
  
BNP No results for input(s): BNPP in the last 72 hours. Liver Enzymes Recent Labs 02/06/19 
0120 TP 6.2* ALB 2.8* * SGOT 11* Thyroid Studies Lab Results Component Value Date/Time TSH 0.84 02/24/2014 09:52 AM  
    
Procedures/imaging: see electronic medical records for all procedures/Xrays and details which were not copied into this note but were reviewed prior to creation of Plan Charmayne Loge, MD

## 2019-02-08 ENCOUNTER — APPOINTMENT (OUTPATIENT)
Dept: CT IMAGING | Age: 59
DRG: 477 | End: 2019-02-08
Attending: INTERNAL MEDICINE
Payer: MEDICARE

## 2019-02-08 ENCOUNTER — HOME HEALTH ADMISSION (OUTPATIENT)
Dept: HOME HEALTH SERVICES | Facility: HOME HEALTH | Age: 59
End: 2019-02-08
Payer: MEDICARE

## 2019-02-08 PROBLEM — F10.939 ALCOHOL WITHDRAWAL (HCC): Status: ACTIVE | Noted: 2019-02-08

## 2019-02-08 PROBLEM — G93.40 ENCEPHALOPATHY ACUTE: Status: ACTIVE | Noted: 2019-02-08

## 2019-02-08 PROBLEM — E46 MALNUTRITION (HCC): Status: ACTIVE | Noted: 2019-02-08

## 2019-02-08 LAB
ALBUMIN SERPL-MCNC: 2.9 G/DL (ref 3.4–5)
ALBUMIN/GLOB SERPL: 0.8 {RATIO} (ref 0.8–1.7)
ALP SERPL-CCNC: 131 U/L (ref 45–117)
ALT SERPL-CCNC: 15 U/L (ref 16–61)
AMMONIA PLAS-SCNC: 29 UMOL/L (ref 11–32)
ANION GAP SERPL CALC-SCNC: 9 MMOL/L (ref 3–18)
AST SERPL-CCNC: 23 U/L (ref 15–37)
BASOPHILS # BLD: 0 K/UL (ref 0–0.1)
BASOPHILS NFR BLD: 0 % (ref 0–2)
BILIRUB SERPL-MCNC: 0.4 MG/DL (ref 0.2–1)
BUN SERPL-MCNC: 4 MG/DL (ref 7–18)
BUN/CREAT SERPL: 6 (ref 12–20)
CALCIUM SERPL-MCNC: 8.6 MG/DL (ref 8.5–10.1)
CHLORIDE SERPL-SCNC: 104 MMOL/L (ref 100–108)
CO2 SERPL-SCNC: 26 MMOL/L (ref 21–32)
CREAT SERPL-MCNC: 0.69 MG/DL (ref 0.6–1.3)
DIFFERENTIAL METHOD BLD: ABNORMAL
EOSINOPHIL # BLD: 0.5 K/UL (ref 0–0.4)
EOSINOPHIL NFR BLD: 4 % (ref 0–5)
ERYTHROCYTE [DISTWIDTH] IN BLOOD BY AUTOMATED COUNT: 12.6 % (ref 11.6–14.5)
GLOBULIN SER CALC-MCNC: 3.5 G/DL (ref 2–4)
GLUCOSE SERPL-MCNC: 74 MG/DL (ref 74–99)
HCT VFR BLD AUTO: 32.9 % (ref 36–48)
HGB BLD-MCNC: 10.2 G/DL (ref 13–16)
LYMPHOCYTES # BLD: 1 K/UL (ref 0.9–3.6)
LYMPHOCYTES NFR BLD: 9 % (ref 21–52)
MAGNESIUM SERPL-MCNC: 1.4 MG/DL (ref 1.6–2.6)
MCH RBC QN AUTO: 27.3 PG (ref 24–34)
MCHC RBC AUTO-ENTMCNC: 31 G/DL (ref 31–37)
MCV RBC AUTO: 88.2 FL (ref 74–97)
MONOCYTES # BLD: 1.3 K/UL (ref 0.05–1.2)
MONOCYTES NFR BLD: 11 % (ref 3–10)
NEUTS SEG # BLD: 9 K/UL (ref 1.8–8)
NEUTS SEG NFR BLD: 76 % (ref 40–73)
PHOSPHATE SERPL-MCNC: 3.3 MG/DL (ref 2.5–4.9)
PLATELET # BLD AUTO: 304 K/UL (ref 135–420)
PMV BLD AUTO: 9.5 FL (ref 9.2–11.8)
POTASSIUM SERPL-SCNC: 3.5 MMOL/L (ref 3.5–5.5)
PROT SERPL-MCNC: 6.4 G/DL (ref 6.4–8.2)
RBC # BLD AUTO: 3.73 M/UL (ref 4.7–5.5)
SODIUM SERPL-SCNC: 139 MMOL/L (ref 136–145)
WBC # BLD AUTO: 11.9 K/UL (ref 4.6–13.2)

## 2019-02-08 PROCEDURE — 84100 ASSAY OF PHOSPHORUS: CPT

## 2019-02-08 PROCEDURE — 65270000029 HC RM PRIVATE

## 2019-02-08 PROCEDURE — 83735 ASSAY OF MAGNESIUM: CPT

## 2019-02-08 PROCEDURE — 80053 COMPREHEN METABOLIC PANEL: CPT

## 2019-02-08 PROCEDURE — 74011250636 HC RX REV CODE- 250/636: Performed by: HOSPITALIST

## 2019-02-08 PROCEDURE — 0QBR0ZX EXCISION OF LEFT TOE PHALANX, OPEN APPROACH, DIAGNOSTIC: ICD-10-PCS | Performed by: PODIATRIST

## 2019-02-08 PROCEDURE — 74011250637 HC RX REV CODE- 250/637: Performed by: HOSPITALIST

## 2019-02-08 PROCEDURE — 97164 PT RE-EVAL EST PLAN CARE: CPT

## 2019-02-08 PROCEDURE — 82140 ASSAY OF AMMONIA: CPT

## 2019-02-08 PROCEDURE — 85025 COMPLETE CBC W/AUTO DIFF WBC: CPT

## 2019-02-08 PROCEDURE — 74011000258 HC RX REV CODE- 258: Performed by: HOSPITALIST

## 2019-02-08 PROCEDURE — 0SPQ04Z REMOVAL OF INTERNAL FIXATION DEVICE FROM LEFT TOE PHALANGEAL JOINT, OPEN APPROACH: ICD-10-PCS | Performed by: PODIATRIST

## 2019-02-08 PROCEDURE — 74011000250 HC RX REV CODE- 250: Performed by: HOSPITALIST

## 2019-02-08 PROCEDURE — 70450 CT HEAD/BRAIN W/O DYE: CPT

## 2019-02-08 RX ORDER — CLONIDINE 0.1 MG/24H
1 PATCH, EXTENDED RELEASE TRANSDERMAL
Status: DISCONTINUED | OUTPATIENT
Start: 2019-02-08 | End: 2019-02-09

## 2019-02-08 RX ORDER — SODIUM CHLORIDE 0.9 % (FLUSH) 0.9 %
5-40 SYRINGE (ML) INJECTION EVERY 8 HOURS
Status: DISCONTINUED | OUTPATIENT
Start: 2019-02-08 | End: 2019-02-11 | Stop reason: HOSPADM

## 2019-02-08 RX ORDER — SODIUM CHLORIDE 0.9 % (FLUSH) 0.9 %
5-40 SYRINGE (ML) INJECTION AS NEEDED
Status: DISCONTINUED | OUTPATIENT
Start: 2019-02-08 | End: 2019-02-11 | Stop reason: HOSPADM

## 2019-02-08 RX ADMIN — VANCOMYCIN HYDROCHLORIDE 750 MG: 10 INJECTION, POWDER, LYOPHILIZED, FOR SOLUTION INTRAVENOUS at 20:08

## 2019-02-08 RX ADMIN — TRIAMCINOLONE ACETONIDE: 1 CREAM TOPICAL at 23:15

## 2019-02-08 RX ADMIN — OMEPRAZOLE 20 MG: 20 CAPSULE, DELAYED RELEASE ORAL at 10:43

## 2019-02-08 RX ADMIN — KETOROLAC TROMETHAMINE 15 MG: 30 INJECTION, SOLUTION INTRAMUSCULAR at 11:22

## 2019-02-08 RX ADMIN — Medication 10 ML: at 16:15

## 2019-02-08 RX ADMIN — DOCUSATE SODIUM 100 MG: 100 CAPSULE, LIQUID FILLED ORAL at 21:09

## 2019-02-08 RX ADMIN — Medication 10 ML: at 06:26

## 2019-02-08 RX ADMIN — HYDROCODONE BITARTRATE AND ACETAMINOPHEN 1 TABLET: 5; 325 TABLET ORAL at 23:29

## 2019-02-08 RX ADMIN — AMLODIPINE BESYLATE 10 MG: 5 TABLET ORAL at 10:44

## 2019-02-08 RX ADMIN — LISINOPRIL 40 MG: 20 TABLET ORAL at 10:44

## 2019-02-08 RX ADMIN — PIPERACILLIN SODIUM,TAZOBACTAM SODIUM 3.38 G: 3; .375 INJECTION, POWDER, FOR SOLUTION INTRAVENOUS at 23:16

## 2019-02-08 RX ADMIN — PIPERACILLIN SODIUM,TAZOBACTAM SODIUM 3.38 G: 3; .375 INJECTION, POWDER, FOR SOLUTION INTRAVENOUS at 17:43

## 2019-02-08 RX ADMIN — PANCRELIPASE LIPASE, PANCRELIPASE PROTEASE, PANCRELIPASE AMYLASE 1 CAPSULE: 10000; 32000; 42000 CAPSULE, DELAYED RELEASE ORAL at 23:16

## 2019-02-08 RX ADMIN — PANCRELIPASE LIPASE, PANCRELIPASE PROTEASE, PANCRELIPASE AMYLASE 1 CAPSULE: 10000; 32000; 42000 CAPSULE, DELAYED RELEASE ORAL at 17:43

## 2019-02-08 RX ADMIN — VANCOMYCIN HYDROCHLORIDE 750 MG: 10 INJECTION, POWDER, LYOPHILIZED, FOR SOLUTION INTRAVENOUS at 07:48

## 2019-02-08 RX ADMIN — PIPERACILLIN SODIUM,TAZOBACTAM SODIUM 3.38 G: 3; .375 INJECTION, POWDER, FOR SOLUTION INTRAVENOUS at 06:23

## 2019-02-08 RX ADMIN — PIPERACILLIN SODIUM,TAZOBACTAM SODIUM 3.38 G: 3; .375 INJECTION, POWDER, FOR SOLUTION INTRAVENOUS at 11:01

## 2019-02-08 RX ADMIN — PIPERACILLIN SODIUM,TAZOBACTAM SODIUM 3.38 G: 3; .375 INJECTION, POWDER, FOR SOLUTION INTRAVENOUS at 00:39

## 2019-02-08 RX ADMIN — TRIAMCINOLONE ACETONIDE: 1 CREAM TOPICAL at 10:55

## 2019-02-08 RX ADMIN — Medication 10 ML: at 23:18

## 2019-02-08 RX ADMIN — FOLIC ACID: 5 INJECTION, SOLUTION INTRAMUSCULAR; INTRAVENOUS; SUBCUTANEOUS at 12:13

## 2019-02-08 RX ADMIN — HYDROCODONE BITARTRATE AND ACETAMINOPHEN 1 TABLET: 5; 325 TABLET ORAL at 04:13

## 2019-02-08 RX ADMIN — Medication 100 MG: at 10:43

## 2019-02-08 RX ADMIN — COLCHICINE 0.6 MG: 0.6 TABLET, FILM COATED ORAL at 10:44

## 2019-02-08 RX ADMIN — FOLIC ACID 1 MG: 1 TABLET ORAL at 10:44

## 2019-02-08 RX ADMIN — DOCUSATE SODIUM 100 MG: 100 CAPSULE, LIQUID FILLED ORAL at 10:43

## 2019-02-08 NOTE — PERIOP NOTES
Pt transferred to room 309 via bed. Awake, alert, stable, denies pain. VSS. Dressing on left foot has very small bloody spot. Blood pressure (!) 179/91, pulse 79, temperature 97.6 °F (36.4 °C), resp. rate 18, height 5' 4\" (1.626 m), weight 44.8 kg (98 lb 12.3 oz), SpO2 98 %. Left foot toes are cool on touch, cap refill >3 sec, Dr Sly Lai awareRicardo was notified in report. Patient's upper dentures at bedside.

## 2019-02-08 NOTE — PROGRESS NOTES
spoke with Merlinda Corpus, 5 [facility] Olman Zhang RN to return call--[phone]

## 2019-02-08 NOTE — ANESTHESIA POSTPROCEDURE EVALUATION
Procedure(s): 
HARDWARE REMOVAL OF LEFT FOOT PARAGON SCREWS WITH C-ARM, BONE BIOPSY. Anesthesia Post Evaluation Multimodal analgesia: multimodal analgesia used between 6 hours prior to anesthesia start to PACU discharge Patient location during evaluation: PACU Patient participation: complete - patient participated Level of consciousness: awake Pain management: adequate Airway patency: patent Anesthetic complications: no 
Cardiovascular status: acceptable Respiratory status: acceptable Hydration status: acceptable Post anesthesia nausea and vomiting:  none Visit Vitals BP (!) 170/94 Pulse 82 Temp 36.7 °C (98 °F) Resp 17 Ht 5' 4\" (1.626 m) Wt 44.8 kg (98 lb 12.3 oz) SpO2 100% BMI 16.95 kg/m²

## 2019-02-08 NOTE — PROGRESS NOTES
Pt is refusing all meds and also refusing to allow me to flush his PICC. He continually pushes my hand and tries to put the bedrail down. Advised pt of procedure he just had and also that he is NWB to Lt foot. Pt states that he needs to get to his room. Advised pt he is in his hospital room. 2125. .spoke with Dr. Felisa Maradiaga, and advised of above info. Per Dr. Felisa Maradiaga, she will sign AMA paperwork. Informed Dr. Felisa Maradiaga that AFB cx and smear was done intra-op and pt should to be on airborne precautions. She agrees. 9236. ..moved pt to room 301 for airborne precautions

## 2019-02-08 NOTE — PROGRESS NOTES
DC Plan: Discharge home with [facility], once medically stable 
  
Chart reviewed. Attended IDRs with MD Jessica Venegas and primary RN. Awaiting cultures. Pt may discharge in 2-3 days. FOC has been offered for [facility]. Pt chose [facility]. Referral has been placed. Pt states his wife will take him home at discharge. Provider, please advise if pt will require long term IV abx at discharge. CM will cont to follow. Care Management Interventions PCP Verified by CM: Yes Mode of Transport at Discharge: Other (see comment)(Family) Transition of Care Consult (CM Consult): Discharge Planning Health Maintenance Reviewed: Yes Physical Therapy Consult: Yes Occupational Therapy Consult: Yes Current Support Network: Lives with Spouse, Family Lives [place] Confirm Follow Up Transport: Self Plan discussed with Pt/Family/Caregiver: Yes Discharge Location Discharge Placement: Home with family assistance(vs HH)

## 2019-02-08 NOTE — PERIOP NOTES
TRANSFER - OUT REPORT: 
 
Verbal report given to Angelito leal RN(name) on [employer]  being transferred to [address] [place] [address](unit) for routine progression of care Report consisted of patients Situation, Background, Assessment and  
Recommendations(SBAR). Information from the following report(s) SBAR, OR Summary, Procedure Summary, MAR and Recent Results was reviewed with the receiving nurse. Lines: PICC Double Lumen 10/29/18 Right;Brachial (Active) PICC Double Lumen 16/06/12 Left;Basilic (Active) Central Line Being Utilized Yes 2/7/2019  6:17 PM  
Criteria for Appropriate Use Long term IV/antibiotic administration 2/7/2019  6:17 PM  
Site Assessment Clean, dry, & intact 2/7/2019  6:17 PM  
Phlebitis Assessment 0 2/7/2019  6:17 PM  
Infiltration Assessment 0 2/7/2019  6:17 PM  
Date of Last Dressing Change 02/04/19 2/7/2019  8:50 AM  
Dressing Status Clean, dry, & intact 2/7/2019  6:17 PM  
Action Taken Open ports on tubing capped 2/7/2019  8:50 AM  
Dressing Type Transparent;Tape 2/7/2019  6:17 PM  
Hub Color/Line Status Purple;Capped 2/7/2019  6:17 PM  
Positive Blood Return (Site #1) Yes 2/7/2019  8:50 AM  
Hub Color/Line Status Red; Infusing 2/7/2019  6:17 PM  
Positive Blood Return (Site #2) Yes 2/7/2019  8:50 AM  
Alcohol Cap Used Yes 2/7/2019  8:50 AM  
  
 
Opportunity for questions and clarification was provided. Patient transported with: 
 Registered Nurse

## 2019-02-08 NOTE — PROGRESS NOTES
Bedside and Verbal shift change report given to Robert Rocha (oncoming nurse) by Miguel Angel Brady RN (offgoing nurse). Report included the following information SBAR, Kardex, Procedure Summary, Intake/Output, MAR, Med Rec Status and Quality Measures.

## 2019-02-08 NOTE — CDMP QUERY
Please clarify if this patient is being treated/managed for: 
 
=> Severe protein calorie malnutrition with significant body fat and muscle depletion, low BMI of 16.9 
=>Other Explanation of clinical findings =>Unable to Determine (no explanation of clinical findings) The medical record reflects the following: 
 
Risk: H/o PAD, cocaine abuse, chronic pancreatitis Clinical Indicators: Patient presented w/ L foot cellulitis d/t infected hardware and also noted to have low BMI 16.9. Nutrition assessment performed by RDN and the patient was found to meet the following malnutrition criteria established by ASPEN/AND: 
  
Adult Malnutrition Guidelines: SEVERE PROTEIN CALORIE MALNUTRITION IN THE CONTEXT OF CHRONIC ILLNESS Energy Intake: <75% energy intake compared to estimated energy needs >1 month Body Fat:  Severe Depletion Muscle Mass: Severe Depletion--   clavicle showing and temporal wasting Weight 98 lbs. BMI 16.9, 
 
Treatment: Nutrition consult, monitor I&O, advance diet as tolerated post-op Please clarify and document your clinical opinion in the progress notes and discharge summary including the definitive and/or presumptive diagnosis, (suspected or probable), related to the above clinical findings. Please include clinical findings supporting your diagnosis. If you DECLINE this query or would like to communicate with [facility], please utilize the \"[employer] message box\" at the TOP of the Progress Note on the right. Thank you, Gideon Ray RN, BSN, [address] [place] [address] [phone]

## 2019-02-08 NOTE — PROGRESS NOTES
spoke with Victoriano Rodriguez, [address] [place] [address] Chris Eaton RN to return call--[phone]

## 2019-02-08 NOTE — PROGRESS NOTES
Called because patient wanted to leave AMA Upon further evulation patient is acutually confused post surgery and is unaware of surroundings Is oriented to person place month and year but confused about when he had surgery and current room Asked nurse to contact son for bedside support 1 to 1 for now Danielito ordered prn  
CT of head and stat labs Likely delierium from surgery ? Alcohol as well will place CIWA orders

## 2019-02-08 NOTE — PROGRESS NOTES
2220..the patient son arrived. he is assisting with pt reorientation. During conversation with son, pt seems to be more oriented. Pt is A&Ox4 and very apologetic. He is now able to verbalize medication he receives and reason for hospitalization. Pt also went to door to verify that he was still in room 309. Pt did allow this RN to flush PICC and administer IV vancomycin along with IVF. Will continue to monitor pt per [facility] protocol.

## 2019-02-08 NOTE — PROGRESS NOTES
Hospitalist Progress Note-critical care note Patient: Chaim Mora MRN: [id number]  CSN: [id number] YOB: 1960  Age: 62 y.o. Sex: male DOA: 2/4/2019 LOS:  LOS: 4 days Chief complaint: foot cellulitis. Htn. Smoker , cocaine abuse  , acute encephalopathy  
htn Assessment/Plan Hospital Problems  Date Reviewed: 2/7/2019 Codes Class Noted POA Alcohol withdrawal ([facility] ICD-10-CM: V17.829 ICD-9-CM: 291.81  2/8/2019 Unknown Malnutrition ([facility] ICD-10-CM: E46 
ICD-9-CM: 263.9  2/8/2019 Unknown Encephalopathy acute ICD-10-CM: G93.40 ICD-9-CM: 348.30  2/8/2019 Unknown Cocaine abuse ([facility].) ICD-10-CM: F14.10 ICD-9-CM: 305.60  2/5/2019 Unknown * (Principal) Cellulitis of left foot ICD-10-CM: L03.116 ICD-9-CM: 682.7  2/4/2019 Unknown Hypertension ICD-10-CM: I10 
ICD-9-CM: 401.9  2/4/2019 Unknown Smoker ICD-10-CM: O13.946 ICD-9-CM: 305.1  2/4/2019 Unknown Pancreatitis chronic ICD-9-CM: 577.1  2/4/2019 Unknown Gout ICD-10-CM: M10.9 ICD-9-CM: 274.9  2/4/2019 Unknown PAD (peripheral artery disease) (HCC) ICD-10-CM: I73.9 ICD-9-CM: 443.9  2/4/2019 Unknown Alcohol use ICD-10-CM: Z78.9 ICD-9-CM: V49.89  2/4/2019 Hyponatremia ICD-10-CM: E87.1 ICD-9-CM: 276.1  2/4/2019 Unknown Acute encephalopathy Got improving, 
 ct head no acute issue Like due to alcohol withdrawal  
 
Alcohol withdrawal  
Already on folate and vit b, will give some banana bag 
Continue ciwa protocol Will put sitter if needs Will put clonidine Foot cellulitis-left Improving Infected wire removed per podiatry, waiting for the cx results Dr. Valentin Torres on board  
picc line placed -on  vanc and zosyn Pain control  
  
  
Pad Left popliteal thrombectomy/pta/stent, left MISAEL pta per Dr. Valentin Mcgregor , tolerated well.  
 
  
 HTN, accelerated Need norvasc for today  
continue pain control Continue home medication. 
  
Smoker Nicotine patch Educated pt  
  
Chronic pancreatitis Continue creon  
  
Hyponatremia Resolved  
  
Alcohol use  
ciwa score 6, will continue monitoring, s 
  
Cocaine abuse Subjective: wants to get my pants and go home to take care of my foot Nurse: score 6 Disposition :waiting for cx results, Review of systems: 
 
General: No fevers or chills. Cardiovascular: No chest pain or pressure. No palpitations. Pulmonary: No shortness of breath. Gastrointestinal: No nausea, vomiting. Msk: foot issue Vital signs/Intake and Output: 
Visit Vitals /86 Pulse 88 Temp 98 °F (36.7 °C) Resp 16 Ht 5' 4\" (1.626 m) Wt 44.8 kg (98 lb 12.3 oz) SpO2 96% BMI 16.95 kg/m² Current Shift:  02/08 0701 - 02/08 1900 In: -  
Out: 450 [Urine:450] Last three shifts:  02/06 1901 - 02/08 0700 In: 900 [P.O.:120; I.V.:800] Out: 1725 [WRWJL:3037] Physical Exam: 
General: WD, WN. Alert, cooperative, no acute distress   
HEENT: NC, Atraumatic. PERRLA, anicteric sclerae. Lungs: CTA Bilaterally. No Wheezing/Rhonchi/Rales. Heart:  Regular  rhythm,  No murmur, No Rubs, No Gallops Abdomen: Soft, Non distended, Non tender.  +Bowel sounds, Extremities: No c/c. Left foot wrapped with dressing Psych:   Not anxious or agitated. Neurologic:  No acute neurological deficit. Labs: Results:  
   
Chemistry Recent Labs 02/08/19 
8399 02/07/19 
7071 02/06/19 
0120 GLU 74 100* 103*  140 141  
K 3.5 3.7 3.7  108 109* CO2 26 27 26 BUN 4* 6* 12  
CREA 0.69 0.79 0.78  
CA 8.6 8.3* 8.4* AGAP 9 5 6 BUCR 6* 8* 15  
*  --  134* TP 6.4  --  6.2* ALB 2.9*  --  2.8*  
GLOB 3.5  --  3.4 AGRAT 0.8  --  0.8 CBC w/Diff Recent Labs 02/08/19 
0420 02/06/19 
0120 WBC 11.9 10.8 RBC 3.73* 3.87* HGB 10.2* 10.6* HCT 32.9* 34.3*  
 304 GRANS 76* 72 LYMPH 9* 10* EOS 4 4 Cardiac Enzymes No results for input(s): CPK, CKND1, DOUGLAS in the last 72 hours. No lab exists for component: Kenya Copas Coagulation No results for input(s): PTP, INR, APTT in the last 72 hours. No lab exists for component: INREXT, INREXT Lipid Panel Lab Results Component Value Date/Time Cholesterol, total 126 02/24/2014 09:52 AM  
 HDL Cholesterol 58 02/24/2014 09:52 AM  
 LDL, calculated 60.6 02/24/2014 09:52 AM  
 VLDL, calculated 7.4 02/24/2014 09:52 AM  
 Triglyceride 37 02/24/2014 09:52 AM  
 CHOL/HDL Ratio 2.2 02/24/2014 09:52 AM  
  
BNP No results for input(s): BNPP in the last 72 hours. Liver Enzymes Recent Labs 02/08/19 
4013 TP 6.4 ALB 2.9*  
* SGOT 23 Thyroid Studies Lab Results Component Value Date/Time TSH 0.84 02/24/2014 09:52 AM  
    
Procedures/imaging: see electronic medical records for all procedures/Xrays and details which were not copied into this note but were reviewed prior to creation of Plan Charmayne Loge, MD

## 2019-02-09 LAB
ANION GAP SERPL CALC-SCNC: 8 MMOL/L (ref 3–18)
BUN SERPL-MCNC: 7 MG/DL (ref 7–18)
BUN/CREAT SERPL: 9 (ref 12–20)
CALCIUM SERPL-MCNC: 8.4 MG/DL (ref 8.5–10.1)
CHLORIDE SERPL-SCNC: 105 MMOL/L (ref 100–108)
CO2 SERPL-SCNC: 28 MMOL/L (ref 21–32)
CREAT SERPL-MCNC: 0.81 MG/DL (ref 0.6–1.3)
GLUCOSE SERPL-MCNC: 105 MG/DL (ref 74–99)
MAGNESIUM SERPL-MCNC: 1.7 MG/DL (ref 1.6–2.6)
POTASSIUM SERPL-SCNC: 3.4 MMOL/L (ref 3.5–5.5)
SODIUM SERPL-SCNC: 141 MMOL/L (ref 136–145)

## 2019-02-09 PROCEDURE — 74011250637 HC RX REV CODE- 250/637: Performed by: HOSPITALIST

## 2019-02-09 PROCEDURE — 97116 GAIT TRAINING THERAPY: CPT

## 2019-02-09 PROCEDURE — 74011000258 HC RX REV CODE- 258: Performed by: HOSPITALIST

## 2019-02-09 PROCEDURE — 74011250636 HC RX REV CODE- 250/636: Performed by: INTERNAL MEDICINE

## 2019-02-09 PROCEDURE — 74011000250 HC RX REV CODE- 250: Performed by: HOSPITALIST

## 2019-02-09 PROCEDURE — 74011250636 HC RX REV CODE- 250/636: Performed by: HOSPITALIST

## 2019-02-09 PROCEDURE — 65270000029 HC RM PRIVATE

## 2019-02-09 PROCEDURE — 77030020847 HC STATLOK BARD -A

## 2019-02-09 PROCEDURE — 83735 ASSAY OF MAGNESIUM: CPT

## 2019-02-09 PROCEDURE — 80048 BASIC METABOLIC PNL TOTAL CA: CPT

## 2019-02-09 RX ADMIN — HYDRALAZINE HYDROCHLORIDE 10 MG: 20 INJECTION INTRAMUSCULAR; INTRAVENOUS at 20:09

## 2019-02-09 RX ADMIN — PANCRELIPASE LIPASE, PANCRELIPASE PROTEASE, PANCRELIPASE AMYLASE 1 CAPSULE: 10000; 32000; 42000 CAPSULE, DELAYED RELEASE ORAL at 08:53

## 2019-02-09 RX ADMIN — HYDROCODONE BITARTRATE AND ACETAMINOPHEN 1 TABLET: 5; 325 TABLET ORAL at 11:53

## 2019-02-09 RX ADMIN — HEPARIN SODIUM 5000 UNITS: 5000 INJECTION INTRAVENOUS; SUBCUTANEOUS at 20:09

## 2019-02-09 RX ADMIN — HYDROMORPHONE HYDROCHLORIDE 2 MG: 1 INJECTION, SOLUTION INTRAMUSCULAR; INTRAVENOUS; SUBCUTANEOUS at 20:00

## 2019-02-09 RX ADMIN — Medication 10 ML: at 13:12

## 2019-02-09 RX ADMIN — KETOROLAC TROMETHAMINE 15 MG: 30 INJECTION, SOLUTION INTRAMUSCULAR at 04:12

## 2019-02-09 RX ADMIN — PIPERACILLIN SODIUM,TAZOBACTAM SODIUM 3.38 G: 3; .375 INJECTION, POWDER, FOR SOLUTION INTRAVENOUS at 05:23

## 2019-02-09 RX ADMIN — FOLIC ACID: 5 INJECTION, SOLUTION INTRAMUSCULAR; INTRAVENOUS; SUBCUTANEOUS at 13:12

## 2019-02-09 RX ADMIN — DOCUSATE SODIUM 100 MG: 100 CAPSULE, LIQUID FILLED ORAL at 08:52

## 2019-02-09 RX ADMIN — TRIAMCINOLONE ACETONIDE: 1 CREAM TOPICAL at 08:53

## 2019-02-09 RX ADMIN — PIPERACILLIN SODIUM,TAZOBACTAM SODIUM 3.38 G: 3; .375 INJECTION, POWDER, FOR SOLUTION INTRAVENOUS at 17:14

## 2019-02-09 RX ADMIN — LISINOPRIL 40 MG: 20 TABLET ORAL at 08:53

## 2019-02-09 RX ADMIN — HYDROCODONE BITARTRATE AND ACETAMINOPHEN 1 TABLET: 5; 325 TABLET ORAL at 16:29

## 2019-02-09 RX ADMIN — COLCHICINE 0.6 MG: 0.6 TABLET, FILM COATED ORAL at 08:52

## 2019-02-09 RX ADMIN — DOCUSATE SODIUM 100 MG: 100 CAPSULE, LIQUID FILLED ORAL at 20:09

## 2019-02-09 RX ADMIN — PIPERACILLIN SODIUM,TAZOBACTAM SODIUM 3.38 G: 3; .375 INJECTION, POWDER, FOR SOLUTION INTRAVENOUS at 11:14

## 2019-02-09 RX ADMIN — TRIAMCINOLONE ACETONIDE: 1 CREAM TOPICAL at 20:12

## 2019-02-09 RX ADMIN — VANCOMYCIN HYDROCHLORIDE 750 MG: 10 INJECTION, POWDER, LYOPHILIZED, FOR SOLUTION INTRAVENOUS at 19:08

## 2019-02-09 RX ADMIN — Medication 100 MG: at 08:53

## 2019-02-09 RX ADMIN — PANCRELIPASE LIPASE, PANCRELIPASE PROTEASE, PANCRELIPASE AMYLASE 1 CAPSULE: 10000; 32000; 42000 CAPSULE, DELAYED RELEASE ORAL at 17:18

## 2019-02-09 RX ADMIN — OMEPRAZOLE 20 MG: 20 CAPSULE, DELAYED RELEASE ORAL at 08:52

## 2019-02-09 RX ADMIN — VANCOMYCIN HYDROCHLORIDE 750 MG: 10 INJECTION, POWDER, LYOPHILIZED, FOR SOLUTION INTRAVENOUS at 06:18

## 2019-02-09 RX ADMIN — FOLIC ACID 1 MG: 1 TABLET ORAL at 08:53

## 2019-02-09 RX ADMIN — HEPARIN SODIUM 5000 UNITS: 5000 INJECTION INTRAVENOUS; SUBCUTANEOUS at 13:20

## 2019-02-09 RX ADMIN — AMLODIPINE BESYLATE 10 MG: 5 TABLET ORAL at 08:52

## 2019-02-09 NOTE — PROGRESS NOTES
Problem: Mobility Impaired (Adult and Pediatric) Goal: *Acute Goals and Plan of Care (Insert Text) Physical Therapy Goals Initiated 2/5/2019 and to be accomplished within 4 day(s) 1. Patient will move from supine <> sit with independence in prep for out of bed activity and change of position. 2.  Patient will perform sit<> stand with mod independence with LRAD in prep for transfers/ambulation. 3.  Patient will transfer from bed <> chair with mod independence with LRAD for time up in chair for completion of ADL activity. 4.  Patient will ambulate 150 feet with modified for improved functional mobility/safe discharge. 5.  Patient will ascend/descend 3-5 stairs with handrail(s) with minimal assistance/contact guard assist for home re-entry as needed. Reestablished 2/8/2019 and to be accomplished within 4 day(s) 1. Patient will move from supine <> sit with independence in prep for out of bed activity and change of position. 2.  Patient will perform sit<> stand with mod independence with LRAD in prep for transfers/ambulation. 3.  Patient will transfer from bed <> chair with mod independence with LRAD for time up in chair for completion of ADL activity. 4.  Patient will ambulate 150 feet with modified for improved functional mobility/safe discharge. 5.  Patient will ascend/descend 3-5 stairs with handrail(s) with minimal assistance/contact guard assist for home re-entry as needed. Outcome: Progressing Towards Goal 
physical Therapy TREATMENT Patient: Jordana Singh (62 y.o. male) Date: 2/9/2019 Diagnosis: Cellulitis of left foot [L03.116] Cellulitis of left foot Procedure(s) (LRB): 
HARDWARE REMOVAL OF LEFT FOOT PARAGON SCREWS WITH C-ARM, BONE BIOPSY (Left) 2 Days Post-Op Precautions: Fall Chart, physical therapy assessment, plan of care and goals were reviewed.  
 
ASSESSMENT: 
Pt sitting EOB, willing to participate, asking for assistance with ace bandage. Rewrapped pt ace bandage and pt donned boot IND. Pt impulsive throughout session with little to no carryover for safety awareness. Pt stating \"I want to go outside, where's [address]?\" Pt demo'd path deviations and LOB x1 but able to self correct. Pt req SBA during gait for safety and amb with RW and GB. Pt refused to return to room but did after max encouragement and instruction for him to rest. Pt returned to room, stating his wife is at a party. Pt sat EOB, IV still donned, boot donned, NAD, all needs met. Pt demo's improved tolerance to PT but poor safety awareness. Will continue to treat to address strength, transfers and safety awareness to prevent falls. Progression toward goals: 
[x]      Improving appropriately and progressing toward goals 
[]      Improving slowly and progressing toward goals 
[]      Not making progress toward goals and plan of care will be adjusted PLAN: 
Patient continues to benefit from skilled intervention to address the above impairments. Continue treatment per established plan of care. Discharge Recommendations:  Home Health Further Equipment Recommendations for Discharge:  N/A  
 
SUBJECTIVE:  
Patient stated Ok ok let's go.  OBJECTIVE DATA SUMMARY:  
Critical Behavior: 
Neurologic State: Alert, Appropriate for age Orientation Level: Oriented X4 Cognition: Appropriate decision making, Appropriate for age attention/concentration, Appropriate safety awareness, Follows commands Safety/Judgement: Awareness of environment, Fall prevention, Good awareness of safety precautions, Home safety Functional Mobility Training: 
Bed Mobility: 
Rolling: Supervision Supine to Sit: Supervision Sit to Supine: Supervision Scooting: Supervision Transfers: 
Sit to Stand: Stand-by assistance Stand to Sit: Supervision Balance: 
Sitting: Intact Standing: IntactAmbulation/Gait Training: 
Distance (ft): 600 Feet (ft) Assistive Device: Gait belt;Walker, rolling Ambulation - Level of Assistance: Supervision;Stand-by assistance Gait Abnormalities: Decreased step clearance; Antalgic; Path deviations Left Side Weight Bearing: As tolerated Base of Support: Shift to right Speed/Becka: Slow;Fluctuations Stairs: 
Number of Stairs Trained: 3 Stairs - Level of Assistance: Stand-by assistance;Contact guard assistance Rail Use: Left Pain: 
Pain Scale 1: Numeric (0 - 10) Pain Intensity 1: 6 Pain Location 1: Foot Pain Orientation 1: Left Pain Description 1: Aching Pain Intervention(s) 1: Medication (see MAR) Activity Tolerance:  
good Please refer to the flowsheet for vital signs taken during this treatment. After treatment:  
[] Patient left in no apparent distress sitting up in chair 
[x] Patient left in no apparent distress in bed 
[x] Call bell left within reach [x] Nursing notified 
[] Caregiver present 
[] Bed alarm activated Arnol Ge Time Calculation: 28 mins

## 2019-02-09 NOTE — PROGRESS NOTES
0720 Assumed care of pt at this time. Pt in bed with no signs of distress. Pt left with call light within reach and encouraged to call for assistance. 0353 Head to toe assessment completed at this time  Patient is A&OX4. Pt denies N/V chest pain and SOB or distress. Pt is calm and cooperative. Skin in warm and dry with ACE wrap dressing on Lt foot and is CDI. Lungs are clear bilaterally. Patient instructed on use and reason for incentive spirometer. Bowel sounds are active. Abdomen is soft and non-tender. PICC line on LT basillic. Pain scale explained to patient. Reasons for taking PRN meds explained to patient. Patient instructed to call for prn when needed. Pain level is 1. Patient was oriented to call bell and bed function. Will continue to monitor 1153 Pt state pain as 6/10. Pt received medication as per MAR. Potential medication side effects explained to patient, patient verbalizes understanding, opportunities for questions provided. Patient stable, no apparent distress at this time, bed in locked position, call bell within reach. 1629 Pt state pain as 6/10. Pt received medication as per MAR. Potential medication side effects explained to patient, patient verbalizes understanding, opportunities for questions provided. Patient stable, no apparent distress at this time, bed in locked position, call bell within reach.

## 2019-02-09 NOTE — PROGRESS NOTES
Problem: Pressure Injury - Risk of 
Goal: *Prevention of pressure injury Document Michael Scale and appropriate interventions in the flowsheet. Outcome: Progressing Towards Goal 
Pressure Injury Interventions: 
Sensory Interventions: Use 30-degree side-lying position, Turn and reposition approx. every two hours (pillows and wedges if needed), Minimize linen layers, Check visual cues for pain, Assess changes in LOC, Monitor skin under medical devices, Maintain/enhance activity level, Keep linens dry and wrinkle-free, Discuss PT/OT consult with provider Moisture Interventions: Offer toileting Q_hr, Minimize layers, Contain wound drainage, Absorbent underpads Activity Interventions: Pressure redistribution bed/mattress(bed type), Increase time out of bed Mobility Interventions: HOB 30 degrees or less, Pressure redistribution bed/mattress (bed type), Turn and reposition approx. every two hours(pillow and wedges) Nutrition Interventions: Document food/fluid/supplement intake

## 2019-02-09 NOTE — PROGRESS NOTES
Problem: Falls - Risk of 
Goal: *Absence of Falls Document Sydni Levi Fall Risk and appropriate interventions in the flowsheet. Outcome: Progressing Towards Goal 
Fall Risk Interventions: 
Mobility Interventions: Utilize walker, cane, or other assistive device, Patient to call before getting OOB Mentation Interventions: Reorient patient, Door open when patient unattended Medication Interventions: Assess postural VS orthostatic hypotension, Patient to call before getting OOB, Teach patient to arise slowly Elimination Interventions: Urinal in reach History of Falls Interventions: Room close to nurse's station Problem: Pressure Injury - Risk of 
Goal: *Prevention of pressure injury Document Michael Scale and appropriate interventions in the flowsheet. Outcome: Progressing Towards Goal 
Pressure Injury Interventions: 
Sensory Interventions: Use 30-degree side-lying position, Turn and reposition approx. every two hours (pillows and wedges if needed), Minimize linen layers, Check visual cues for pain, Assess changes in LOC, Monitor skin under medical devices, Maintain/enhance activity level, Keep linens dry and wrinkle-free, Discuss PT/OT consult with provider Moisture Interventions: Offer toileting Q_hr, Minimize layers, Contain wound drainage, Absorbent underpads Activity Interventions: Increase time out of bed, Pressure redistribution bed/mattress(bed type) Mobility Interventions: HOB 30 degrees or less, Pressure redistribution bed/mattress (bed type) Nutrition Interventions: Document food/fluid/supplement intake

## 2019-02-09 NOTE — PROGRESS NOTES
Does not appear to be in any pain though when questioned he states that the foot does not feel any different than prior to his angio AF VSS Cor RR Abd benign Ext small hematoma right groin w/o evidence of pseudoaneurysm, left foot is actually warm w/ dp and pt signals. He has a palpable left popliteal pulse. Some concern over viability of great toe but remainder of foot is warm and appears rather well perfused. Discussed with Dr Aidan Bruner. I think at this point, foot can be followed. Will likely obtain non-invasive studies as an outpatient. His limb may still be salvageable. Will continue to follow with you. Nida Libman MD FACS [facility] EVMS Asst Prof Surgery PG [phone] Cell [phone]

## 2019-02-09 NOTE — PROGRESS NOTES
2109 - Head to toe assessment completed at this time. Pt denies any chest pain or SOB. Pt denies any numbness or tingling to extremities. Pt educated on the use of the ICS and how to use it. Pt encouraged to manage pain and to call for assistance. 2329 - Pt stated that pain was a 8/10. Pt given pain medication per MAR. Pt educated on the side effects of medication and the importance of managing pain. Will continue to monitor. 0100 - Pt in bed sleeping. Pt encouraged to call for assistance and to manage pain. Pt left with no signs of distress. Will continue to monitor. Shift summary - Pt pain managed with prn medication per MAR. Pt informed about all medications and side effects and encouraged to ask questions about medication. Pt encouraged throughout the night to use incentive spirometer and the purpose of the incentive spirometer. Pt left with no signs of distress and any concerns of pt addressed.

## 2019-02-09 NOTE — ROUTINE PROCESS
Bedside and Verbal shift change report given to CINDY Aguayo RN (oncoming nurse) by Chris Martinez. Elisabeth Presley RN (offgoing nurse). Report included the following information SBAR, Kardex, Intake/Output, MAR and Recent Results.

## 2019-02-09 NOTE — PROGRESS NOTES
Problem: Mobility Impaired (Adult and Pediatric) Goal: *Acute Goals and Plan of Care (Insert Text) Physical Therapy Goals Initiated 2/5/2019 and to be accomplished within 4 day(s) 1. Patient will move from supine <> sit with independence in prep for out of bed activity and change of position. 2.  Patient will perform sit<> stand with mod independence with LRAD in prep for transfers/ambulation. 3.  Patient will transfer from bed <> chair with mod independence with LRAD for time up in chair for completion of ADL activity. 4.  Patient will ambulate 150 feet with modified for improved functional mobility/safe discharge. 5.  Patient will ascend/descend 3-5 stairs with handrail(s) with minimal assistance/contact guard assist for home re-entry as needed. Reestablished 2/8/2019 and to be accomplished within 4 day(s) 1. Patient will move from supine <> sit with independence in prep for out of bed activity and change of position. 2.  Patient will perform sit<> stand with mod independence with LRAD in prep for transfers/ambulation. 3.  Patient will transfer from bed <> chair with mod independence with LRAD for time up in chair for completion of ADL activity. 4.  Patient will ambulate 150 feet with modified for improved functional mobility/safe discharge. 5.  Patient will ascend/descend 3-5 stairs with handrail(s) with minimal assistance/contact guard assist for home re-entry as needed. Outcome: Progressing Towards Goal 
physical Therapy RE-EVALUATION and TREATMENT Patient: Francisca Perrin (62 y.o. male) Date: 2/9/2019 Diagnosis: Cellulitis of left foot [L03.116] Cellulitis of left foot Procedure(s) (LRB): 
HARDWARE REMOVAL OF LEFT FOOT PARAGON SCREWS WITH C-ARM, BONE BIOPSY (Left) 2 Days Post-Op Precautions: Fall ASSESSMENT: 
Spoke with Dr. Haile Palmer following surgical intervention, recommendation is for WBAT with boot. Pt amb 100 ft c/ RW, boot donned independently.  No LOB or unsteadiness noted at this time. Pt safe to d/c home with 24 hour supervision/assistance, due to confusion and intermittent cognitive deficits. Patient's progression toward goals since last assessment: Good PLAN: 
Goals have been updated based on progression since last assessment. Patient continues to benefit from skilled intervention to address the above impairments. Continue to follow the patient 1-2 times per day/4-7 days per week to address goals. Planned Interventions: 
[x]     Bed Mobility Training          [x]     Neuromuscular Re-Education 
[x]     Transfer Training                []    Orthotic/Prosthetic Training 
[x]     Gait Training                       []     Modalities [x]     Therapeutic Exercises       []     Edema Management/Control 
[x]     Therapeutic Activities         [x]     Patient and Family Training/Education 
[]     Other (comment): 
Discharge Recommendations: Home Health Further Equipment Recommendations for Discharge: N/A  
 
SUBJECTIVE:  
Patient stated I am doing alright, I want a cigaratte.  OBJECTIVE DATA SUMMARY:  
Critical Behavior: 
Neurologic State: Alert, Appropriate for age Orientation Level: Appropriate for age Cognition: Appropriate decision making, Appropriate for age attention/concentration, Appropriate safety awareness Safety/Judgement: Awareness of environment, Fall prevention, Good awareness of safety precautions, Home safety Functional Mobility Training: 
Bed Mobility: 
Supine to/from sit with ModITransfers: 
Sit to/from stand supervision/ModIAmbulation/Gait Training: 
Amb 100 ft c/RW, supervision, boot donned Pain: 5/10Activity Tolerance:  
Good Please refer to the flowsheet for vital signs taken during this treatment. After treatment:  
[]  Patient left in no apparent distress sitting up in chair 
[x]  Patient left in no apparent distress in bed 
[x]  Call bell left within reach [x]  Nursing notified 
[]  Caregiver present []  Bed alarm activated Paolo Bright Time Calculation: 20 mins

## 2019-02-09 NOTE — PROGRESS NOTES
Hospitalist Progress Note Patient: Wei Powell MRN: [id number]  CSN: [id number] YOB: 1960  Age: 62 y.o. Sex: male DOA: 2/4/2019 LOS:  LOS: 5 days Assessment and Plan: 
 
Principal Problem: 
  Cellulitis of left foot (2/4/2019) Active Problems: Hypertension (2/4/2019) Smoker (2/4/2019) Pancreatitis chronic (2/4/2019) Gout (2/4/2019) PAD (peripheral artery disease) ([address] (2/4/2019) Alcohol use (2/4/2019) Hyponatremia (2/4/2019) Cocaine abuse ([address] (2/5/2019) Alcohol withdrawal ([address] (2/8/2019) Malnutrition ([address] (2/8/2019) Encephalopathy acute (2/8/2019) Acute encephalopathy : resolved Like due to alcohol withdrawal  
  
Alcohol withdrawal :CIWA now normal. Will stop clonidine  
  
  
Foot cellulitis-left :  Improving Infected wire removed per podiatry, waiting for the cx results Dr. Leland Gonzalez was following   
picc line placed -on  vanc and zosyn Pain control  
  
  
Pad  
  Left popliteal thrombectomy/pta/stent, left MISAEL pta per Dr. Trevor Jewell , tolerated well.  
  
  
 HTN,: better with norvasc  
continue pain control  
  
  
Continue home medication. 
  
Smoker Nicotine patch Educated pt  
  
Chronic pancreatitis ; Continue creon  
  
Hyponatremia : Resolved Chief complaint:  foot cellulitis. Htn. Smoker , cocaine abuse  , acute encephalopathy Subjective: 
 
Feels good overall Review of systems: 
 
General: No fevers or chills. Cardiovascular: No chest pain or pressure. No palpitations. Pulmonary: No shortness of breath. Gastrointestinal: No nausea, vomiting. Objective: 
 
Vital signs/Intake and Output: 
Visit Vitals BP (!) 137/97 Pulse 71 Temp 98.4 °F (36.9 °C) Resp 16 Ht 5' 4\" (1.626 m) Wt 47.7 kg (105 lb 2.6 oz) SpO2 99% BMI 18.05 kg/m² Current Shift:  No intake/output data recorded. Last three shifts:  02/07 1901 - 02/09 0700 In: 3115 [P.O.:480; I.V.:2635] Out: 1025 [Urine:1025] Physical Exam: 
General: NAD, AAOx3. Non-toxic. HEENT: NC/AT. PERRLA, EOMI.  MMM. Lungs: Nml inspection. CTA B/L. No wheezing, rales or rhonchi. Heart:  S1S2 RRR,  PMI mid 5th IC space. No M/RG. Abdomen: Soft, NT/ND.  BS+. No peritoneal signs. Extremities: No C/C/E. Psych:   Nml affect. Neurologic:  2-12 intact. Strength 5/5 throughout. Sensation symmetrical. 
 
 
 
 
Labs: Results:  
   
Chemistry Recent Labs 02/09/19 
2354 02/08/19 
1542 02/07/19 
3672 * 74 100*  139 140  
K 3.4* 3.5 3.7  104 108 CO2 28 26 27 BUN 7 4* 6*  
CREA 0.81 0.69 0.79 CA 8.4* 8.6 8.3* AGAP 8 9 5 BUCR 9* 6* 8* AP  --  131*  --   
TP  --  6.4  --   
ALB  --  2.9*  --   
GLOB  --  3.5  --   
AGRAT  --  0.8  --   
  
CBC w/Diff Recent Labs 02/08/19 
0336 WBC 11.9 RBC 3.73* HGB 10.2* HCT 32.9*  
 GRANS 76* LYMPH 9*  
EOS 4 Cardiac Enzymes No results for input(s): CPK, CKND1, DOUGLAS in the last 72 hours. No lab exists for component: Lavada Bates Coagulation No results for input(s): PTP, INR, APTT in the last 72 hours. No lab exists for component: INREXT Lipid Panel Lab Results Component Value Date/Time Cholesterol, total 126 02/24/2014 09:52 AM  
 HDL Cholesterol 58 02/24/2014 09:52 AM  
 LDL, calculated 60.6 02/24/2014 09:52 AM  
 VLDL, calculated 7.4 02/24/2014 09:52 AM  
 Triglyceride 37 02/24/2014 09:52 AM  
 CHOL/HDL Ratio 2.2 02/24/2014 09:52 AM  
  
BNP No results for input(s): BNPP in the last 72 hours. Liver Enzymes Recent Labs 02/08/19 
6206 TP 6.4 ALB 2.9*  
* SGOT 23 Thyroid Studies Lab Results Component Value Date/Time TSH 0.84 02/24/2014 09:52 AM  
    
Procedures/imaging: see electronic medical records for all procedures/Xrays and details which were not copied into this note but were reviewed prior to creation of Plan

## 2019-02-09 NOTE — PROGRESS NOTES
Patient alert and oriented to self and place only. He is sitting in chair eating breakfast. Lung sounds clear bilaterally, active bowel sounds. 1120hrs Patient complained of pain to the left foot of 8/10 in the pain scale. Toradol 15mg IVP given at this time. Dressing changed at this time. 1500hrs Patient ambulated to the bathroom with assistance. 1945hrs Bedside, Verbal and Written shift change report given to CHAZ Mcguire (oncoming nurse) by Dilma Ferro RN (offgoing nurse). Report included the following information SBAR, Kardex, OR Summary, Procedure Summary, Intake/Output, MAR, Accordion, Recent Results and Med Rec Status. All questions answered.

## 2019-02-09 NOTE — PROGRESS NOTES
Problem: Falls - Risk of 
Goal: *Absence of Falls Document Serenity Garcia Fall Risk and appropriate interventions in the flowsheet. Outcome: Progressing Towards Goal 
Fall Risk Interventions: 
Mobility Interventions: Patient to call before getting OOB Mentation Interventions: Update white board, Adequate sleep, hydration, pain control Medication Interventions: Patient to call before getting OOB, Teach patient to arise slowly Elimination Interventions: Urinal in reach, Call light in reach, Toilet paper/wipes in reach History of Falls Interventions: Room close to nurse's station

## 2019-02-09 NOTE — PROGRESS NOTES
Progress Note Patient: Reese Del Cid               Sex: male          DOA: 2/4/2019 YOB: 1960      Age:  62 y.o.        LOS:  LOS: 5 days Subjective:  
Pt seen today for follow up of bone biopsy and explantation of hw. Doing well. No complaints. Feeling better. Objective:  
  
Visit Vitals BP (!) 137/97 Pulse 71 Temp 98.4 °F (36.9 °C) Resp 16 Ht 5' 4\" (1.626 m) Wt 47.7 kg (105 lb 2.6 oz) SpO2 99% BMI 18.05 kg/m² Physical Exam: 
Incision is coapted and seems to be healing. No SOI Intake and Output: 
Current Shift:  No intake/output data recorded. Last three shifts:  02/07 1901 - 02/09 0700 In: 3115 [P.O.:480; I.V.:2635] Out: 1025 [Urine:1025] Lab/Data Reviewed: All lab results for the last 24 hours reviewed. All lab results for the last 24 hours reviewed. Medications Reviewed Assessment/Plan Principal Problem: 
  Cellulitis of left foot (2/4/2019) Active Problems: Hypertension (2/4/2019) Smoker (2/4/2019) Pancreatitis chronic (2/4/2019) Gout (2/4/2019) PAD (peripheral artery disease) ([address] (2/4/2019) Alcohol use (2/4/2019) Hyponatremia (2/4/2019) Cocaine abuse ([address] (2/5/2019) Alcohol withdrawal ([address] (2/8/2019) Malnutrition ([address] (2/8/2019) Encephalopathy acute (2/8/2019) Doing well following hardware removal and bone biopsy. Bone cultures show s. Aureus. No sensitivities yet. Would recommend DC home with PICC and IV abx per infectious dz consult. Dressing reapplied. Marylou Miller DPM 
February 9, 2019

## 2019-02-10 LAB
ANION GAP SERPL CALC-SCNC: 8 MMOL/L (ref 3–18)
BACTERIA SPEC CULT: ABNORMAL
BUN SERPL-MCNC: 9 MG/DL (ref 7–18)
BUN/CREAT SERPL: 7 (ref 12–20)
CALCIUM SERPL-MCNC: 8.3 MG/DL (ref 8.5–10.1)
CHLORIDE SERPL-SCNC: 105 MMOL/L (ref 100–108)
CO2 SERPL-SCNC: 28 MMOL/L (ref 21–32)
CREAT SERPL-MCNC: 1.29 MG/DL (ref 0.6–1.3)
DATE LAST DOSE: ABNORMAL
GLUCOSE SERPL-MCNC: 98 MG/DL (ref 74–99)
GRAM STN SPEC: ABNORMAL
GRAM STN SPEC: ABNORMAL
MAGNESIUM SERPL-MCNC: 2.3 MG/DL (ref 1.6–2.6)
POTASSIUM SERPL-SCNC: 3.4 MMOL/L (ref 3.5–5.5)
REPORTED DOSE,DOSE: ABNORMAL UNITS
REPORTED DOSE/TIME,TMG: 700
SERVICE CMNT-IMP: ABNORMAL
SODIUM SERPL-SCNC: 141 MMOL/L (ref 136–145)
VANCOMYCIN TROUGH SERPL-MCNC: 23.9 UG/ML (ref 10–20)

## 2019-02-10 PROCEDURE — 80202 ASSAY OF VANCOMYCIN: CPT

## 2019-02-10 PROCEDURE — 65270000029 HC RM PRIVATE

## 2019-02-10 PROCEDURE — 74011000258 HC RX REV CODE- 258: Performed by: HOSPITALIST

## 2019-02-10 PROCEDURE — 97530 THERAPEUTIC ACTIVITIES: CPT

## 2019-02-10 PROCEDURE — 74011000250 HC RX REV CODE- 250: Performed by: HOSPITALIST

## 2019-02-10 PROCEDURE — 74011250636 HC RX REV CODE- 250/636: Performed by: INTERNAL MEDICINE

## 2019-02-10 PROCEDURE — 36592 COLLECT BLOOD FROM PICC: CPT

## 2019-02-10 PROCEDURE — 74011250636 HC RX REV CODE- 250/636: Performed by: HOSPITALIST

## 2019-02-10 PROCEDURE — 97116 GAIT TRAINING THERAPY: CPT

## 2019-02-10 PROCEDURE — 74011250637 HC RX REV CODE- 250/637: Performed by: HOSPITALIST

## 2019-02-10 PROCEDURE — 80048 BASIC METABOLIC PNL TOTAL CA: CPT

## 2019-02-10 PROCEDURE — 83735 ASSAY OF MAGNESIUM: CPT

## 2019-02-10 RX ORDER — CEFAZOLIN SODIUM 2 G/50ML
2 SOLUTION INTRAVENOUS EVERY 8 HOURS
Status: DISCONTINUED | OUTPATIENT
Start: 2019-02-10 | End: 2019-02-11 | Stop reason: HOSPADM

## 2019-02-10 RX ADMIN — HYDROCODONE BITARTRATE AND ACETAMINOPHEN 1 TABLET: 5; 325 TABLET ORAL at 06:56

## 2019-02-10 RX ADMIN — Medication 10 ML: at 06:53

## 2019-02-10 RX ADMIN — OMEPRAZOLE 20 MG: 20 CAPSULE, DELAYED RELEASE ORAL at 10:45

## 2019-02-10 RX ADMIN — HYDROMORPHONE HYDROCHLORIDE 2 MG: 1 INJECTION, SOLUTION INTRAMUSCULAR; INTRAVENOUS; SUBCUTANEOUS at 06:56

## 2019-02-10 RX ADMIN — VANCOMYCIN HYDROCHLORIDE 750 MG: 10 INJECTION, POWDER, LYOPHILIZED, FOR SOLUTION INTRAVENOUS at 06:52

## 2019-02-10 RX ADMIN — PIPERACILLIN SODIUM,TAZOBACTAM SODIUM 3.38 G: 3; .375 INJECTION, POWDER, FOR SOLUTION INTRAVENOUS at 06:15

## 2019-02-10 RX ADMIN — HYDROCODONE BITARTRATE AND ACETAMINOPHEN 1 TABLET: 5; 325 TABLET ORAL at 16:15

## 2019-02-10 RX ADMIN — FOLIC ACID 1 MG: 1 TABLET ORAL at 10:46

## 2019-02-10 RX ADMIN — PANCRELIPASE LIPASE, PANCRELIPASE PROTEASE, PANCRELIPASE AMYLASE 1 CAPSULE: 10000; 32000; 42000 CAPSULE, DELAYED RELEASE ORAL at 19:56

## 2019-02-10 RX ADMIN — Medication 100 MG: at 10:46

## 2019-02-10 RX ADMIN — LISINOPRIL 40 MG: 20 TABLET ORAL at 10:45

## 2019-02-10 RX ADMIN — HEPARIN SODIUM 5000 UNITS: 5000 INJECTION INTRAVENOUS; SUBCUTANEOUS at 21:18

## 2019-02-10 RX ADMIN — HYDROMORPHONE HYDROCHLORIDE 2 MG: 1 INJECTION, SOLUTION INTRAMUSCULAR; INTRAVENOUS; SUBCUTANEOUS at 02:55

## 2019-02-10 RX ADMIN — PIPERACILLIN SODIUM,TAZOBACTAM SODIUM 3.38 G: 3; .375 INJECTION, POWDER, FOR SOLUTION INTRAVENOUS at 01:18

## 2019-02-10 RX ADMIN — HYDROCODONE BITARTRATE AND ACETAMINOPHEN 1 TABLET: 5; 325 TABLET ORAL at 02:55

## 2019-02-10 RX ADMIN — PANCRELIPASE LIPASE, PANCRELIPASE PROTEASE, PANCRELIPASE AMYLASE 1 CAPSULE: 10000; 32000; 42000 CAPSULE, DELAYED RELEASE ORAL at 10:45

## 2019-02-10 RX ADMIN — DOCUSATE SODIUM 100 MG: 100 CAPSULE, LIQUID FILLED ORAL at 10:46

## 2019-02-10 RX ADMIN — CEFAZOLIN SODIUM 2 G: 2 SOLUTION INTRAVENOUS at 18:05

## 2019-02-10 RX ADMIN — AMLODIPINE BESYLATE 10 MG: 5 TABLET ORAL at 10:46

## 2019-02-10 RX ADMIN — HYDROMORPHONE HYDROCHLORIDE 2 MG: 1 INJECTION, SOLUTION INTRAMUSCULAR; INTRAVENOUS; SUBCUTANEOUS at 13:23

## 2019-02-10 RX ADMIN — HYDROMORPHONE HYDROCHLORIDE 2 MG: 1 INJECTION, SOLUTION INTRAMUSCULAR; INTRAVENOUS; SUBCUTANEOUS at 19:52

## 2019-02-10 RX ADMIN — DOCUSATE SODIUM 100 MG: 100 CAPSULE, LIQUID FILLED ORAL at 21:19

## 2019-02-10 RX ADMIN — COLCHICINE 0.6 MG: 0.6 TABLET, FILM COATED ORAL at 10:45

## 2019-02-10 RX ADMIN — HEPARIN SODIUM 5000 UNITS: 5000 INJECTION INTRAVENOUS; SUBCUTANEOUS at 13:09

## 2019-02-10 RX ADMIN — TRIAMCINOLONE ACETONIDE: 1 CREAM TOPICAL at 09:00

## 2019-02-10 RX ADMIN — HEPARIN SODIUM 5000 UNITS: 5000 INJECTION INTRAVENOUS; SUBCUTANEOUS at 06:16

## 2019-02-10 RX ADMIN — PIPERACILLIN SODIUM,TAZOBACTAM SODIUM 3.38 G: 3; .375 INJECTION, POWDER, FOR SOLUTION INTRAVENOUS at 13:11

## 2019-02-10 RX ADMIN — HYDROCODONE BITARTRATE AND ACETAMINOPHEN 1 TABLET: 5; 325 TABLET ORAL at 19:52

## 2019-02-10 RX ADMIN — TRIAMCINOLONE ACETONIDE: 1 CREAM TOPICAL at 21:20

## 2019-02-10 RX ADMIN — HYDROCODONE BITARTRATE AND ACETAMINOPHEN 1 TABLET: 5; 325 TABLET ORAL at 10:45

## 2019-02-10 RX ADMIN — FOLIC ACID: 5 INJECTION, SOLUTION INTRAMUSCULAR; INTRAVENOUS; SUBCUTANEOUS at 10:46

## 2019-02-10 NOTE — PROGRESS NOTES
Hospitalist Progress Note Patient: Amanuel Person MRN: [id number]  CSN: [id number] YOB: 1960  Age: 62 y.o. Sex: male DOA: 2/4/2019 LOS:  LOS: 6 days Assessment and Plan: 
 
Principal Problem: 
  Cellulitis of left foot (2/4/2019) Active Problems: Hypertension (2/4/2019) Smoker (2/4/2019) Pancreatitis chronic (2/4/2019) Gout (2/4/2019) PAD (peripheral artery disease) ([address] (2/4/2019) Alcohol use (2/4/2019) Hyponatremia (2/4/2019) Cocaine abuse ([address] (2/5/2019) Alcohol withdrawal ([address] (2/8/2019) Malnutrition ([address] (2/8/2019) Encephalopathy acute (2/8/2019) Acute encephalopathy : resolved Like due to alcohol withdrawal  
  
Alcohol withdrawal :CIWA now normal. Will stop clonidine  
  
  
Foot cellulitis-left :  Improving  
Infected wire removed per podiatry, Dr. Charito Granado was following   
picc line placed -on  vanc and zosyn . He may need to go home on this. WEill clarify length of therapy in am   
 
Pain control  
  
  
Pad  
  Left popliteal thrombectomy/pta/stent, left MISAEL pta per Dr. Rivera Speak , tolerated well.  
  
  
 HTN,: better with norvasc  
continue pain control Will moniter 
  
  
Continue home medication. 
  
Smoker Nicotine patch Educated pt  
  
Chronic pancreatitis ; Continue creon  
  
Hyponatremia : Resolved Chief complaint:  foot cellulitis. Htn. Smoker , cocaine abuse  , acute encephalopathy  
 
 
 
 
Subjective: 
 
Feels pretty good overall Review of systems: 
 
General: No fevers or chills. Cardiovascular: No chest pain or pressure. No palpitations. Pulmonary: No shortness of breath. Gastrointestinal: No nausea, vomiting. Objective: 
 
Vital signs/Intake and Output: 
Visit Vitals BP (!) 146/92 Pulse 91 Temp 97.3 °F (36.3 °C) Resp 16 Ht 5' 4\" (1.626 m) Wt 47.7 kg (105 lb 2.6 oz) SpO2 100% BMI 18.05 kg/m² Current Shift:  02/10 0701 - 02/10 1900 In: -  
Out: 450 [Urine:450] Last three shifts:  02/08 1901 - 02/10 0700 In: 1500 [P.O.:800; I.V.:700] Out: 1425 [BHYLA:8064] Physical Exam: 
General: NAD, AAOx3. Non-toxic. HEENT: NC/AT. PERRLA, EOMI.  MMM. Lungs: Nml inspection. CTA B/L. No wheezing, rales or rhonchi. Heart:  S1S2 RRR,  PMI mid 5th IC space. No M/RG. Abdomen: Soft, NT/ND.  BS+. No peritoneal signs. Extremities: No C/C/E. Psych:   Nml affect. Neurologic:  2-12 intact. Strength 5/5 throughout. Sensation symmetrical. 
 
 
 
 
Labs: Results:  
   
Chemistry Recent Labs  
  02/10/19 
0250 02/09/19 
6586 02/08/19 
7002 GLU 98 105* 74  141 139  
K 3.4* 3.4* 3.5  105 104 CO2 28 28 26 BUN 9 7 4* CREA 1.29 0.81 0.69 CA 8.3* 8.4* 8.6 AGAP 8 8 9 BUCR 7* 9* 6* AP  --   --  131* TP  --   --  6.4 ALB  --   --  2.9*  
GLOB  --   --  3.5 AGRAT  --   --  0.8 CBC w/Diff Recent Labs 02/08/19 
1075 WBC 11.9 RBC 3.73* HGB 10.2* HCT 32.9*  
 GRANS 76* LYMPH 9*  
EOS 4 Cardiac Enzymes No results for input(s): CPK, CKND1, DOUGLAS in the last 72 hours. No lab exists for component: Branda Cork Coagulation No results for input(s): PTP, INR, APTT in the last 72 hours. No lab exists for component: INREXT Lipid Panel Lab Results Component Value Date/Time Cholesterol, total 126 02/24/2014 09:52 AM  
 HDL Cholesterol 58 02/24/2014 09:52 AM  
 LDL, calculated 60.6 02/24/2014 09:52 AM  
 VLDL, calculated 7.4 02/24/2014 09:52 AM  
 Triglyceride 37 02/24/2014 09:52 AM  
 CHOL/HDL Ratio 2.2 02/24/2014 09:52 AM  
  
BNP No results for input(s): BNPP in the last 72 hours. Liver Enzymes Recent Labs 02/08/19 
2608 TP 6.4 ALB 2.9*  
* SGOT 23 Thyroid Studies Lab Results Component Value Date/Time  TSH 0.84 02/24/2014 09:52 AM  
    
Procedures/imaging: see electronic medical records for all procedures/Xrays and details which were not copied into this note but were reviewed prior to creation of Plan

## 2019-02-10 NOTE — PROGRESS NOTES
1928- Patient purple port completely clogged. Paged Dr. Zunilda Hernandez for Cath-Kenny. 
6646- Cathflo PRN ordered as per Dr. Zunilda Hernandez. 1945- PICC line dressing change completed with change to end caps. After change, both ports began to flush with positive blood return. Pt instructed not to tape PICC like if dressing begins to come undone, but to call staff and inform them so dressing change can be completed. Shift Summary- PICC Line dressing change completed. Pt medicated for pain to left foot. Dressing CDI. PRN dose of Hydralazine given for elevated B/P Patient Vitals for the past 12 hrs: 
 Temp Pulse Resp BP SpO2  
02/10/19 0351 98.1 °F (36.7 °C) 70 16 148/79 100 % 02/09/19 2251 98.2 °F (36.8 °C) 83 16 142/78 100 % 02/09/19 1956 97.5 °F (36.4 °C) 77 16 (!) 175/92 100 %

## 2019-02-10 NOTE — PROGRESS NOTES
Problem: Mobility Impaired (Adult and Pediatric) Goal: *Acute Goals and Plan of Care (Insert Text) Physical Therapy Goals Initiated 2/5/2019 and to be accomplished within 4 day(s) 1. Patient will move from supine <> sit with independence in prep for out of bed activity and change of position. 2.  Patient will perform sit<> stand with mod independence with LRAD in prep for transfers/ambulation. 3.  Patient will transfer from bed <> chair with mod independence with LRAD for time up in chair for completion of ADL activity. 4.  Patient will ambulate 150 feet with modified for improved functional mobility/safe discharge. 5.  Patient will ascend/descend 3-5 stairs with handrail(s) with minimal assistance/contact guard assist for home re-entry as needed. Reestablished 2/8/2019 and to be accomplished within 4 day(s) 1. Patient will move from supine <> sit with independence in prep for out of bed activity and change of position. 2.  Patient will perform sit<> stand with mod independence with LRAD in prep for transfers/ambulation. 3.  Patient will transfer from bed <> chair with mod independence with LRAD for time up in chair for completion of ADL activity. 4.  Patient will ambulate 150 feet with modified for improved functional mobility/safe discharge. 5.  Patient will ascend/descend 3-5 stairs with handrail(s) with minimal assistance/contact guard assist for home re-entry as needed. Outcome: Resolved/Met Date Met: 02/10/19 
physical Therapy TREATMENT/DISCHARGE Patient: Román Coad (62 y.o. male) Date: 2/10/2019 Diagnosis: Cellulitis of left foot [L03.116] Cellulitis of left foot Procedure(s) (LRB): 
HARDWARE REMOVAL OF LEFT FOOT PARAGON SCREWS WITH C-ARM, BONE BIOPSY (Left) 3 Days Post-Op Precautions: Fall Chart, physical therapy assessment, plan of care and goals were reviewed. ASSESSMENT: 
Pt sitting in chair upon arrival, c/o increased toe/foot pain today.  Pt able to [employer] and amb today with less assistance and improved safety awareness. Pt instructed to utilize RW to allow for less WB through LLE if in pain with min carryover. Pt completed stair neg 3 steps asc/desc x2 with SBA during and no LOB. Pt has progressed nicely and only limiting factor is pt's pain. He demo's appropriate strength and activity tolerance to be cleared from PT. HHPT or OPPT is appropriate. Progression toward goals: 
[x]      Goals met [x]      Improving appropriately and progressing toward goals 
[]      Improving slowly and progressing toward goals 
[]      Not making progress toward goals and plan of care will be adjusted PLAN: 
Patient will be discharged from physical therapy at this time. Rationale for discharge: 
[x] Goals Achieved 
[] [address] 
[] Patient not participating in therapy 
[] Other: 
Discharge Recommendations:  Home Health and Outpatient Further Equipment Recommendations for Discharge:  N/A  
 
SUBJECTIVE:  
Patient stated What's up Mr Antionette Fitzpatrick.  OBJECTIVE DATA SUMMARY:  
Critical Behavior: 
Neurologic State: Alert Orientation Level: Oriented X4 Cognition: Follows commands Safety/Judgement: Awareness of environment, Fall prevention, Good awareness of safety precautions, Home safety Functional Mobility Training: 
Bed Mobility: 
Transfers: 
Sit to Stand: Supervision;Modified independent Stand to Sit: Modified independent;Supervision Balance: 
Sitting: Intact Standing: IntactAmbulation/Gait Training: 
Distance (ft): 400 Feet (ft) Assistive Device: Walker, rolling;Gait belt;Orthotic device Ambulation - Level of Assistance: Supervision;Modified independent Gait Abnormalities: Decreased step clearance; Antalgic Left Side Weight Bearing: As tolerated Base of Support: Shift to right Speed/Becka: Fluctuations Step Length: Right shortened;Left shortened Stairs: 
Number of Stairs Trained: 6 Stairs - Level of Assistance: Stand-by assistance Rail Use: Left Pain: 
Pain Scale 1: Numeric (0 - 10) Pain Intensity 1: 9 Pain Location 1: Foot Pain Orientation 1: Left Pain Intervention(s) 1: Medication (see MAR) Activity Tolerance:  
Fair+ Please refer to the flowsheet for vital signs taken during this treatment. After treatment:  
[x] Patient left in no apparent distress sitting up in chair 
[] Patient left in no apparent distress in bed 
[x] Call bell left within reach [x] Nursing notified 
[] Caregiver present 
[] Bed alarm activated Brayan Core Time Calculation: 23 mins

## 2019-02-10 NOTE — ROUTINE PROCESS
Bedside shift change report given to [address] [place] [address] (oncoming nurse) by Madhav Duque RN (offgoing nurse). Report included the following information SBAR, Kardex, Intake/Output, MAR, Recent Results and Alarm Parameters .

## 2019-02-10 NOTE — PROGRESS NOTES
1808 [facility] care of patient from Valentina Ramos RN. Shift assessment initiated. Pain voiced at 7/10, will medicate per [facility] at appropriate time. Medicated prior to shift change by PM nurse. IS at bedside and encouraged. Ice placed at site for comfort. All questions and concerns answered. All needs met. Bed in the lowest position. Call bell/phone within reach. No signs of distress noted. Will monitor for changes. 1030 Scheduled medications given. Pain voiced at 8/10, medicated per [facility]. 
 
1130 Patient ambulating in hallway. No assistance needed. 1230 PT at bedside. 1315 Pain voiced at 10/10, medicated per MAR. Returned to bed from chair. Will monitor for changes. 1612 Bedside and Verbal shift change report given to Salvador Herr RN (oncoming nurse) by Marie Dasilva RN (offgoing nurse). Report included the following information SBAR, Kardex, MAR, Recent Results and Med Rec Status.

## 2019-02-10 NOTE — ROUTINE PROCESS
Bedside shift change report given to Alberta Sender (oncoming nurse) by Kim RAYMONDRN (offgoing nurse). Report included the following information SBAR, Kardex and MAR.

## 2019-02-10 NOTE — PROGRESS NOTES
Problem: Falls - Risk of 
Goal: *Absence of Falls Document Rani Frankel Fall Risk and appropriate interventions in the flowsheet. Outcome: Progressing Towards Goal 
Fall Risk Interventions: 
Mobility Interventions: PT Consult for mobility concerns Mentation Interventions: Reorient patient, Door open when patient unattended Medication Interventions: Evaluate medications/consider consulting pharmacy, Teach patient to arise slowly Elimination Interventions: Urinal in reach History of Falls Interventions: Room close to nurse's station

## 2019-02-11 VITALS
DIASTOLIC BLOOD PRESSURE: 90 MMHG | OXYGEN SATURATION: 98 % | RESPIRATION RATE: 17 BRPM | BODY MASS INDEX: 18.04 KG/M2 | HEART RATE: 84 BPM | SYSTOLIC BLOOD PRESSURE: 178 MMHG | WEIGHT: 105.7 LBS | HEIGHT: 64 IN | TEMPERATURE: 97.9 F

## 2019-02-11 LAB
CRP SERPL-MCNC: 3.7 MG/DL (ref 0–0.3)
MAGNESIUM SERPL-MCNC: 2.2 MG/DL (ref 1.6–2.6)

## 2019-02-11 PROCEDURE — 87389 HIV-1 AG W/HIV-1&-2 AB AG IA: CPT

## 2019-02-11 PROCEDURE — 74011250637 HC RX REV CODE- 250/637: Performed by: HOSPITALIST

## 2019-02-11 PROCEDURE — 74011250636 HC RX REV CODE- 250/636: Performed by: HOSPITALIST

## 2019-02-11 PROCEDURE — 74011250636 HC RX REV CODE- 250/636: Performed by: INTERNAL MEDICINE

## 2019-02-11 PROCEDURE — 36592 COLLECT BLOOD FROM PICC: CPT

## 2019-02-11 PROCEDURE — 36415 COLL VENOUS BLD VENIPUNCTURE: CPT

## 2019-02-11 PROCEDURE — 86140 C-REACTIVE PROTEIN: CPT

## 2019-02-11 PROCEDURE — 83735 ASSAY OF MAGNESIUM: CPT

## 2019-02-11 RX ORDER — HYDROCODONE BITARTRATE AND ACETAMINOPHEN 5; 325 MG/1; MG/1
1 TABLET ORAL
Qty: 12 TAB | Refills: 0 | Status: SHIPPED | OUTPATIENT
Start: 2019-02-11 | End: 2019-02-27

## 2019-02-11 RX ORDER — CLONIDINE HYDROCHLORIDE 0.1 MG/1
0.1 TABLET ORAL 2 TIMES DAILY
Status: DISCONTINUED | OUTPATIENT
Start: 2019-02-11 | End: 2019-02-11 | Stop reason: HOSPADM

## 2019-02-11 RX ORDER — MORPHINE SULFATE 15 MG/1
15 TABLET, FILM COATED, EXTENDED RELEASE ORAL EVERY 12 HOURS
Status: DISCONTINUED | OUTPATIENT
Start: 2019-02-11 | End: 2019-02-11 | Stop reason: HOSPADM

## 2019-02-11 RX ORDER — MORPHINE SULFATE 15 MG/1
15 TABLET, FILM COATED, EXTENDED RELEASE ORAL EVERY 12 HOURS
Qty: 6 TAB | Refills: 0 | Status: SHIPPED | OUTPATIENT
Start: 2019-02-11 | End: 2019-02-27

## 2019-02-11 RX ORDER — ASPIRIN 325 MG/1
100 TABLET, FILM COATED ORAL DAILY
Qty: 30 TAB | Refills: 0 | Status: SHIPPED | OUTPATIENT
Start: 2019-02-12

## 2019-02-11 RX ORDER — FOLIC ACID 1 MG/1
1 TABLET ORAL DAILY
Qty: 30 TAB | Refills: 0 | Status: SHIPPED | OUTPATIENT
Start: 2019-02-12

## 2019-02-11 RX ORDER — CLONIDINE HYDROCHLORIDE 0.1 MG/1
0.1 TABLET ORAL 2 TIMES DAILY
Qty: 60 TAB | Refills: 0 | Status: SHIPPED | OUTPATIENT
Start: 2019-02-11

## 2019-02-11 RX ORDER — CEFAZOLIN SODIUM 2 G/50ML
2 SOLUTION INTRAVENOUS EVERY 8 HOURS
Qty: 50 ML | Refills: 0 | Status: SHIPPED
Start: 2019-02-11 | End: 2019-02-11

## 2019-02-11 RX ORDER — CEFTRIAXONE SODIUM 2 G/1
2 INJECTION, POWDER, FOR SOLUTION INTRAVENOUS EVERY 24 HOURS
Qty: 2 G | Refills: 0 | Status: ON HOLD | OUTPATIENT
Start: 2019-02-11 | End: 2019-02-27 | Stop reason: SDUPTHER

## 2019-02-11 RX ORDER — HYDROCODONE BITARTRATE AND ACETAMINOPHEN 7.5; 325 MG/1; MG/1
1 TABLET ORAL
Status: DISCONTINUED | OUTPATIENT
Start: 2019-02-11 | End: 2019-02-11 | Stop reason: HOSPADM

## 2019-02-11 RX ADMIN — TRIAMCINOLONE ACETONIDE: 1 CREAM TOPICAL at 08:38

## 2019-02-11 RX ADMIN — PANCRELIPASE LIPASE, PANCRELIPASE PROTEASE, PANCRELIPASE AMYLASE 1 CAPSULE: 10000; 32000; 42000 CAPSULE, DELAYED RELEASE ORAL at 08:37

## 2019-02-11 RX ADMIN — OMEPRAZOLE 20 MG: 20 CAPSULE, DELAYED RELEASE ORAL at 08:24

## 2019-02-11 RX ADMIN — FOLIC ACID 1 MG: 1 TABLET ORAL at 08:24

## 2019-02-11 RX ADMIN — HYDROMORPHONE HYDROCHLORIDE 2 MG: 1 INJECTION, SOLUTION INTRAMUSCULAR; INTRAVENOUS; SUBCUTANEOUS at 03:09

## 2019-02-11 RX ADMIN — HYDROCODONE BITARTRATE AND ACETAMINOPHEN 1 TABLET: 5; 325 TABLET ORAL at 01:32

## 2019-02-11 RX ADMIN — COLCHICINE 0.6 MG: 0.6 TABLET, FILM COATED ORAL at 08:24

## 2019-02-11 RX ADMIN — HYDROCODONE BITARTRATE AND ACETAMINOPHEN 1 TABLET: 7.5; 325 TABLET ORAL at 10:37

## 2019-02-11 RX ADMIN — Medication 10 ML: at 07:19

## 2019-02-11 RX ADMIN — Medication 10 ML: at 07:18

## 2019-02-11 RX ADMIN — Medication 100 MG: at 08:24

## 2019-02-11 RX ADMIN — CLONIDINE HYDROCHLORIDE 0.1 MG: 0.1 TABLET ORAL at 11:04

## 2019-02-11 RX ADMIN — HYDRALAZINE HYDROCHLORIDE 10 MG: 20 INJECTION INTRAMUSCULAR; INTRAVENOUS at 01:24

## 2019-02-11 RX ADMIN — HYDROCODONE BITARTRATE AND ACETAMINOPHEN 1 TABLET: 5; 325 TABLET ORAL at 05:06

## 2019-02-11 RX ADMIN — LISINOPRIL 40 MG: 20 TABLET ORAL at 08:24

## 2019-02-11 RX ADMIN — CEFAZOLIN SODIUM 2 G: 2 SOLUTION INTRAVENOUS at 00:21

## 2019-02-11 RX ADMIN — HYDROMORPHONE HYDROCHLORIDE 2 MG: 1 INJECTION, SOLUTION INTRAMUSCULAR; INTRAVENOUS; SUBCUTANEOUS at 00:17

## 2019-02-11 RX ADMIN — CEFAZOLIN SODIUM 2 G: 2 SOLUTION INTRAVENOUS at 08:24

## 2019-02-11 RX ADMIN — HYDROMORPHONE HYDROCHLORIDE 2 MG: 1 INJECTION, SOLUTION INTRAMUSCULAR; INTRAVENOUS; SUBCUTANEOUS at 07:15

## 2019-02-11 RX ADMIN — DOCUSATE SODIUM 100 MG: 100 CAPSULE, LIQUID FILLED ORAL at 08:24

## 2019-02-11 RX ADMIN — HEPARIN SODIUM 5000 UNITS: 5000 INJECTION INTRAVENOUS; SUBCUTANEOUS at 05:08

## 2019-02-11 RX ADMIN — AMLODIPINE BESYLATE 10 MG: 5 TABLET ORAL at 08:24

## 2019-02-11 NOTE — PROGRESS NOTES
Discharge instructions reviewed with the patient. Patient verbalized understanding. All questions answered. PICC in place for long-term antibiotics. Patient wife at bedside to transport home. Patient armband removed and shredded.

## 2019-02-11 NOTE — ROUTINE PROCESS
Bedside shift change report given to MARICARMEN Johnson RN (oncoming nurse) by Jovita Kelley (offgoing nurse). Report included the following information SBAR, Kardex, Intake/Output and MAR.

## 2019-02-11 NOTE — DISCHARGE INSTRUCTIONS
Patient Education   Patient Education        Alcohol Detoxification and Withdrawal: Care Instructions  Your Care Instructions    If you drink alcohol regularly and then suddenly stop, you may go through some physical and emotional problems while the alcohol clears out of your system. Clearing the alcohol from your body is called detoxification, or detox. Physical and emotional problems that may happen during detox are called withdrawal.  Symptoms of withdrawal can be scary and dangerous. Mild symptoms include nausea and vomiting, sweating, shakiness, and intense worry. Severe symptoms include being confused and irritable, feeling things on your body that are not there, seeing or hearing things that are not there, and trembling. You may even have seizures. If your symptoms become severe you must see a doctor. People who drink large amounts of alcohol should not try to detox at home. A person can die of severe alcohol withdrawal.  Symptoms of alcohol withdrawal may begin from 4 to 12 hours after you stop drinking. But they may not start for several days after the last drink. They can last a few days. It is hard to stop drinking. But when you have cleared the alcohol from your system, you will be able to start the next part of your life, free from the burden of being dependent. Follow-up care is a key part of your treatment and safety. Be sure to make and go to all appointments, and call your doctor if you are having problems. It's also a good idea to know your test results and keep a list of the medicines you take. How can you care for yourself at home? · Before you stop drinking, talk to your doctor about how you plan to stop. Be sure to be completely honest with him or her about how much you have been drinking. Your doctor will figure out whether you need to detox in a supervised medical center. · Take your medicines exactly as prescribed.  Call your doctor if you think you are having a problem with your medicine. · Make sure someone you trust is with you the whole time. Have friends and family members take turns staying with you until you are done with detox. · Put a list of emergency numbers near the phone. This should include your doctor, the police, the nearest hospital and emergency room, and neighbors who can help if needed. · Make sure all alcohol is removed from the house before you start. This includes beverages as well as medicines, rubbing alcohol, and certain flavorings like vanilla extract. · Keep \"drinking buddies\" away during the time you are going through detox. · Make your surroundings calm. Soft lights, soft music, and a comfortable place to sit or lie down can help make the process easier. · Drink lots of fluids and eat snacks such as fruit, cheese and crackers, and pretzels. Foods high in carbohydrate may help reduce the craving for alcohol. · Understand that detox is going to be hard. · Keep in mind that the people watching over you during detox are there to help. Explain to them before you start that you may not act like yourself until detox is finished. · Consider joining a support group such as Alcoholics Anonymous. Sharing your experiences with other people who face similar challenges may help you feel less overwhelmed. · Keep the numbers for these national suicide hotlines: 4-348-031-TALK (3-943.191.6934) and 9-098-RNTKLRC (9-936.341.7392). If you or someone you know talks about suicide or feeling hopeless, get help right away. When should you call for help? Call 911 anytime you think you may need emergency care.  For example, call if:    · You feel you cannot stop from hurting yourself or someone else.     · You vomit many times and cannot stop.     · You vomit blood or what looks like coffee grounds.     · You have trouble breathing or are breathing very fast.     · Your heart beats more than 120 times a minute and will not slow down.     · You have chest pain.     · You have a seizure.     · You see or feel things that are not there (hallucinate).    If you are caring for someone who is going through detox, call if:    · The person passes out (loses consciousness).     · The person sees or feels things that are not there and sees or hears the same things many times.     · The person is very agitated and will not calm down.     · The person becomes violent or threatens to be violent.     · The person has a seizure.    Call your doctor now or seek immediate medical care if:    · You have a high fever.     · You have severe belly pain.     · You are very shaky.    Watch closely for changes in your health, and be sure to contact your doctor if:    · You do not get better as expected. Where can you learn more? Go to http://neil-yoon.info/. Enter [phone] in the search box to learn more about \"Alcohol Detoxification and Withdrawal: Care Instructions. \"  Current as of: May 7, 2018  Content Version: 11.9  © 1429-2354 [employer]. Care instructions adapted under license by [employer] (which disclaims liability or warranty for this information). If you have questions about a medical condition or this instruction, always ask your healthcare professional. [address] any warranty or liability for your use of this information. Cellulitis: Care Instructions  Your Care Instructions    Cellulitis is a skin infection caused by bacteria, most often strep or staph. It often occurs after a break in the skin from a scrape, cut, bite, or puncture, or after a rash. Cellulitis may be treated without doing tests to find out what caused it. But your doctor may do tests, if needed, to look for a specific bacteria, like methicillin-resistant Staphylococcus aureus (MRSA). The doctor has checked you carefully, but problems can develop later. If you notice any problems or new symptoms, get medical treatment right away.   Follow-up care is a key part of your treatment and safety. Be sure to make and go to all appointments, and call your doctor if you are having problems. It's also a good idea to know your test results and keep a list of the medicines you take. How can you care for yourself at home? · Take your antibiotics as directed. Do not stop taking them just because you feel better. You need to take the full course of antibiotics. · Prop up the infected area on pillows to reduce pain and swelling. Try to keep the area above the level of your heart as often as you can. · If your doctor told you how to care for your wound, follow your doctor's instructions. If you did not get instructions, follow this general advice:  ? Wash the wound with clean water 2 times a day. Don't use hydrogen peroxide or alcohol, which can slow healing. ? You may cover the wound with a thin layer of petroleum jelly, such as Vaseline, and a nonstick bandage. ? Apply more petroleum jelly and replace the bandage as needed. · Be safe with medicines. Take pain medicines exactly as directed. ? If the doctor gave you a prescription medicine for pain, take it as prescribed. ? If you are not taking a prescription pain medicine, ask your doctor if you can take an over-the-counter medicine. To prevent cellulitis in the future  · Try to prevent cuts, scrapes, or other injuries to your skin. Cellulitis most often occurs where there is a break in the skin. · If you get a scrape, cut, mild burn, or bite, wash the wound with clean water as soon as you can to help avoid infection. Don't use hydrogen peroxide or alcohol, which can slow healing. · If you have swelling in your legs (edema), support stockings and good skin care may help prevent leg sores and cellulitis. · Take care of your feet, especially if you have diabetes or other conditions that increase the risk of infection. Wear shoes and socks. Do not go barefoot.  If you have athlete's foot or other skin problems on your feet, talk to your doctor about how to treat them. When should you call for help? Call your doctor now or seek immediate medical care if:    · You have signs that your infection is getting worse, such as:  ? Increased pain, swelling, warmth, or redness. ? Red streaks leading from the area. ? Pus draining from the area. ? A fever.     · You get a rash.    Watch closely for changes in your health, and be sure to contact your doctor if:    · You do not get better as expected. Where can you learn more? Go to http://neil-yoon.info/. Gregor Medicefe in the search box to learn more about \"Cellulitis: Care Instructions. \"  Current as of: April 17, 2018  Content Version: 11.9  © 6831-0926 [employer]. Care instructions adapted under license by [employer] (which disclaims liability or warranty for this information). If you have questions about a medical condition or this instruction, always ask your healthcare professional. [address] any warranty or liability for your use of this information.

## 2019-02-11 NOTE — PROGRESS NOTES
Pt alert and oriented X4. Rates pain 8/10 after pain administration. Pt manual BP taken 170/90. Clonidine pill administered. 11:45 am Dr. Patricio Rashid paged to make her aware of poor pain management. Pt pain always 8 or higher. Pt does not appear in extreme pain, for most of shift resting quietly in bed watching television, but continues to rate pain 8/10 after pain med administration. Per IDANIA Avendaño, pt will d/c today with [facility], wife will pick-up patient. 12:45 pm Pt d/c instructions given by RODERICK Farr RN.  
 
1:15pm pt received HIV lab prior to d/c. Pt refused to wait for transport and left floor on his own. Pt found by nurse at front lobby entrance. Witnessed getting safely in the car. Lara Zelaya he can not wait because wife is waiting and is upset that she has to wait. Visit Vitals /90 Pulse 84 Temp 97.9 °F (36.6 °C) Resp 17 Ht 5' 4\" (1.626 m) Wt 47.9 kg (105 lb 11.2 oz) SpO2 98% BMI 18.14 kg/m²

## 2019-02-11 NOTE — PROGRESS NOTES
NUTRITION FOLLOW-UP 
RECOMMENDATIONS/PLAN:  
- Supplement: Ensure Enlive TID 
-Add MVI Diet: Dental Soft Monitor labs/lytes, PO intakes, skin integrity, wt, fluid status, BM 
 
NUTRITION ASSESSMENT:  
Client Update: 62 yrs old Male with cellulitis of foot-nurse noted left toes edematous and big toe discolored, HTN, PAD, chronic pancreatitis, hyponatremia, substance abuse and ETOH abuse, acute encephalopathy-resolved. Infected wire was removed in food per podiatry. FOOD/NUTRITION INTAKE Diet Order:  [facility] Food Allergies: NKFA Average PO Intake:     
Patient Vitals for the past 100 hrs: 
 % Diet Eaten 02/11/19 0600 0 % 02/08/19 1636 100 % Pertinent Medications:  [x] Reviewed; colace, zenpep, thiamine Electrolyte Replacement Protocol: []K []Mg []PO4 Insulin:  []SSI  []Pre-meal   []Basal    []Drip  []None Cultural/P[employer] Food Preferences: None Identified BIOCHEMICAL DATA & MEDICAL TESTS Pertinent Labs:  [x] Reviewed; ANTHROPOMETRICS Height: 5' 4\" (162.6 cm)       Weight: 47.9 kg (105 lb 11.2 oz) BMI: 18.1 kg/m^2 underweight (Less than or = to 18.5% BMI) Adm Weight: 110 lbs                Weight change: -5lbs Adjusted Body Weight: n/a NUTRITION-FOCUSED PHYSICAL ASSESSMENT Skin: No PU     
GI: +BM 2/8/19 NUTRITION PRESCRIPTION Calories: 3417-6514 kcal/day based on 35-40kcal/kg Protein: 60-75 g/day based on 1.2-1.5 g/kg KETPH: 3696-1911 EH/SHONDA based on 1 kcal/ml   
 
NUTRITION DIAGNOSES:  
1. Malnutrition related to inadequate oral intake as evidence by NFPE and BMI 18.1 NUTRITION INTERVENTIONS:  
INTERVENTIONS:        GOALS: 
1. Supplement: Ensure Enlive TID 
MVI Diet: Dental Soft  1. Encourage PO intake >50% at most meals by next review 3 days LEARNING NEEDS (Diet, Supplementation, Food/Nutrient-Drug Interaction): 
 [] None Identified   [] Education provided/documented      Identified and patient: [] Declined   [x] Was not appropriate/indicated NUTRITION MONITORING /EVALUATION:  
Follow PO intake Monitor wt Monitor renal labs, electrolytes, fluid status Monitor for additional supplement needs Previous Recommendations Implemented: yes Previous Goals Met:  N/A - 
   
[] Participated in Interdisciplinary Rounds   
[x] Interdisciplinary Care Plan Reviewed DISCHARGE NUTRITION RECOMMENDATIONS ADDRESSED:  
  
  [x] To be determined closer to discharge NUTRITION RISK:           [x] At risk                        [] Not currently at risk Will follow-up per policy. Yolande Hackett 9

## 2019-02-11 NOTE — PROGRESS NOTES
Problem: Falls - Risk of 
Goal: *Absence of Falls Document Hamp Mary Anne Fall Risk and appropriate interventions in the flowsheet. Outcome: Progressing Towards Goal 
Fall Risk Interventions: 
Mobility Interventions: Patient to call before getting OOB Mentation Interventions: More frequent rounding Medication Interventions: Evaluate medications/consider consulting pharmacy Elimination Interventions: Call light in reach, Urinal in reach History of Falls Interventions: Room close to nurse's station Problem: Pressure Injury - Risk of 
Goal: *Prevention of pressure injury Document Michael Scale and appropriate interventions in the flowsheet. Pressure Injury Interventions: 
Sensory Interventions: Check visual cues for pain, Float heels Moisture Interventions: Offer toileting Q_hr Activity Interventions: Pressure redistribution bed/mattress(bed type) Mobility Interventions: Pressure redistribution bed/mattress (bed type) Nutrition Interventions: Document food/fluid/supplement intake

## 2019-02-11 NOTE — PROGRESS NOTES
Pt alert and oriented X4. Pt code purple today. Walked off unit with IV pole, security called and patient returned to room. Pt left toes edematous. Big toe discolored. IP consult to vascular Dr. Ramona Parra. According to Dr. Arnoldo Martinez notes he is aware of patient foot condition. Visit Vitals BP (!) 165/98 (BP 1 Location: Left arm, BP Patient Position: Sitting) Pulse 92 Temp 97.8 °F (36.6 °C) Resp 16 Ht 5' 4\" (1.626 m) Wt 47.7 kg (105 lb 2.6 oz) SpO2 100% BMI 18.05 kg/m²

## 2019-02-11 NOTE — PROGRESS NOTES
Left foot dressing intact, unable to move toes with left great toe discoloration, cap refill greater than 3, sensation intact, but unable to assess pedal pulses due to dressing and patient refusing to have foot moved at this time due to pain. Patient required Dilaudid 2 mg IV for throbbing, sharp pain to left foot rating at 9/10, which provided relief. Ice pack placed on left foot and elevated on pillow. /101 HR 87. Patient received pain medication at this time. Will recheck pain level and BP in 1 hr. If BP remains elevated, will administer hydralazine as ordered. 0125 -  /94, HR 85. Hydralazine 10 mg IV given. Will monitor. 0133 - /91 HR 88. Pain to left foot  8/10 at this time. Will continue to monitor. 9444 Shift Summary:  Patient required alternating Dilaudid 2 mg IV with Norco 1 tablet po for adequate relief for left foot pain. BP remained elevated overnight and required Hydralazine 10 mg IV x 1. Left foot elevated through the night with ice pack.

## 2019-02-11 NOTE — PROGRESS NOTES
Problem: Pressure Injury - Risk of 
Goal: *Prevention of pressure injury Document Michael Scale and appropriate interventions in the flowsheet. Outcome: Progressing Towards Goal 
Pressure Injury Interventions: 
Sensory Interventions: Avoid rigorous massage over bony prominences, Check visual cues for pain, Float heels Moisture Interventions: Offer toileting Q_hr, Minimize layers, Contain wound drainage, Absorbent underpads Activity Interventions: Pressure redistribution bed/mattress(bed type), PT/OT evaluation Mobility Interventions: Pressure redistribution bed/mattress (bed type) Nutrition Interventions: Document food/fluid/supplement intake, Discuss nutritional consult with provider

## 2019-02-11 NOTE — PROGRESS NOTES
D/C Plan: [facility] [address] 2/11/19 Noted pt is medically stable for discharge and will need daily IVABX. Noted pt with a history of drug use. CM spoke with ID and he feels this is not an issue given it is a history and not current. CM met with pt to discuss transition of care options. Pt would like to come to [facility] for continued IVABX. Pt's family will transport him to and from [facility]. Pt will also have [address] for continued care. CM spoke with pt's wife and she is aware and in agreement with this plan as well. Pt's family will transport pt home today. No other transition of care needs have been identified. Care Management Interventions PCP Verified by CM: Yes Mode of Transport at Discharge: Other (see comment)(Family/Lyft) Transition of Care Consult (CM Consult): Discharge Planning, [address]) [address]: Yes Health Maintenance Reviewed: Yes Physical Therapy Consult: Yes Occupational Therapy Consult: Yes Current Support Network: Lives with Spouse, Family Lives [place] [facility] Follow Up Transport: Family Plan discussed with Pt/Family/Caregiver: Yes Freedom of Choice Offered: Yes Discharge Location Discharge Placement: Home with home health([facility] and [facility])

## 2019-02-11 NOTE — ROUTINE PROCESS
Bedside shift change report given to HUNG Howard RN (oncoming nurse) by MARICARMEN Gilmore (offgoing nurse). Report included the following information SBAR, Kardex, Intake/Output and MAR.
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
## 2019-02-11 NOTE — CONSULTS
Infectious Disease Consult    Impression:   · MSSA OM of L great toe  · Resolved HCV infection    Plan:   CAX #1/42    His intraop cultures grew out MSSA from his L great toe; will need 6wks IV therapy from OR date ( terminate [id number]) with weekly labs (qMondays) - CBC, CMP, and CRP. I can see in [facility] this Wednesday and weekly to follow. He swears to me up/down that he never has used IVD; Smoked some THC from a relative last weekend - denies any coke usage. IF this PICC becomes secondarily infected, he'll need to complete his 6wks as inpatient somewhere. Get an HIV and CRP before DC today. Ashley Cortés MD FACP  Infectious Diseases  Cell [phone]    Subjective:   Date of Consultation:  February 11, 2019  Referring Physician: Dr Mar Munoz    Mr Hu Garcia is a 62 y.o. AAM who is being seen for MSSA infection of L great toe. He had hardware placed in that toe last OCT that continued to be symptomatic, but no suppuration until past few weeks for which he was admitted last week and had the hardware explanted Wednesday. No f/s/c. Toe feels better. Lives locally. Emphatically denied any prior h/o IVDU or cocaine usage knowingly. Smoked some week from a relative the previous weekend (but UDS didn't show THC, only cocaine and opioids - takes oxycodone/apap for toe). States that [facility] [address] [facility] in [facility], but stated that he got it from drinking water. No IVDA or blood products. Doesn't recall ever being tested for HIV. Review of Epic showed (+) HCV Ab back in MAY14 with concomitant (-) PCR (demonstrating natural resolution without active treatment at some point in his past). Review of his LFTs show no chronic ALT elevations. Patient Active Problem List   Diagnosis Code    Pancreatitis K85.90    Dehydration E86.0    Abdominal pain R10.9    Gout attack M10.9    Cellulitis of left foot L03. 116    Hypertension I10    Smoker F17.200    Pancreatitis chronic     Gout M10.9    PAD (peripheral artery disease) ([facility]) I73.9    Alcohol use Z78.9    Hyponatremia E87.1    Cocaine abuse (HCC) F14.10    Alcohol withdrawal (HCC) F10.239    Malnutrition ([address] E46    Encephalopathy acute G93.40     Past Medical History:   Diagnosis Date    Gout     Hypertension     Pancreatitis chronic       History reviewed. No pertinent family history. Social History     Tobacco Use    Smoking status: Current Every Day Smoker     Packs/day: 0.50     Years: 20.00     Pack years: 10.00    Smokeless tobacco: Never Used    Tobacco comment: using chantix   Substance Use Topics    Alcohol use: Yes     Alcohol/week: 1.2 oz     Types: 2 Cans of beer per week     Past Surgical History:   Procedure Laterality Date    HX CHOLECYSTECTOMY      VASCULAR SURGERY PROCEDURE UNLIST      vascular      Prior to Admission medications    Medication Sig Start Date End Date Taking? Authorizing Provider   colchicine (COLCRYS) 0.6 mg tablet Take 0.6 mg by mouth daily. Yes Provider, Historical   famotidine (PEPCID) 20 mg tablet Take 20 mg by mouth daily. Yes Provider, Historical   triamcinolone acetonide (KENALOG) 0.1 % topical cream Apply  to affected area. use thin layer   Yes Provider, Historical   LIPASE/PROTEASE/AMYLASE (CREON PO) Take  by mouth three (3) times daily. Yes Other, MD An   lisinopril (PRINIVIL, ZESTRIL) 40 mg tablet Take 40 mg by mouth daily. Yes Other, MD An   amLODIPine (NORVASC) 10 mg tablet Take 1 Tab by mouth daily. 6/3/13  Yes Brian Blair MD   hydrocodone-acetaminophen [facility]) 5-325 mg per tablet Take 1 tablet by mouth every eight (8) hours as needed for Pain.  10/4/14   Rickie Reyes MD     No Known Allergies     Review of Systems:  Constitutional: negative for fevers, chills and sweats  Respiratory: negative for cough or hemoptysis  Cardiovascular: negative for chest pain, dyspnea  Gastrointestinal: negative for nausea, vomiting, diarrhea, abdominal pain and jaundice    Objective:   Blood pressure 178/90, pulse 84, temperature 97.9 °F (36.6 °C), resp. rate 17, height 5' 4\" (1.626 m), weight 47.9 kg (105 lb 11.2 oz), SpO2 98 %.   Temp (24hrs), Av.9 °F (36.6 °C), Min:97.3 °F (36.3 °C), Max:98.8 °F (37.1 °C)    Current Facility-Administered Medications   Medication Dose Route Frequency    cloNIDine HCl (CATAPRES) tablet 0.1 mg  0.1 mg Oral BID    HYDROcodone-acetaminophen (NORCO) 7.5-325 mg per tablet 1 Tab  1 Tab Oral Q4H PRN    ceFAZolin (ANCEF) 2g IVPB in 50 mL D5W  2 g IntraVENous Q8H    alteplase (CATHFLO) 2 mg in sterile water (preservative free) 2 mL injection  2 mg InterCATHeter PRN    sodium chloride (NS) flush 5-40 mL  5-40 mL IntraVENous Q8H    sodium chloride (NS) flush 5-40 mL  5-40 mL IntraVENous PRN    haloperidol lactate (HALDOL) injection 2 mg  2 mg IntraVENous Q4H PRN    sodium chloride (NS) flush 5-40 mL  5-40 mL IntraVENous Q8H    sodium chloride (NS) flush 5-40 mL  5-40 mL IntraVENous PRN    LORazepam (ATIVAN) tablet 1 mg  1 mg Oral Q1H PRN    Or    LORazepam (ATIVAN) injection 1 mg  1 mg IntraVENous Q1H PRN    LORazepam (ATIVAN) tablet 2 mg  2 mg Oral Q1H PRN    Or    LORazepam (ATIVAN) injection 2 mg  2 mg IntraVENous Q1H PRN    LORazepam (ATIVAN) injection 3 mg  3 mg IntraVENous Q15MIN PRN    flumazenil (ROMAZICON) 0.1 mg/mL injection 0.2 mg  0.2 mg IntraVENous Rad Multiple    heparin (porcine) 1,000 unit/mL injection 10,000 Units  10,000 Units IntraVENous Rad Multiple    midazolam (PF) (VERSED) injection 0.5-4 mg  0.5-4 mg IntraVENous Rad Multiple    naloxone (NARCAN) injection 0.2 mg  0.2 mg IntraVENous PRN    hydrALAZINE (APRESOLINE) 20 mg/mL injection 10 mg  10 mg IntraVENous Q6H PRN    acetaminophen (TYLENOL) tablet 650 mg  650 mg Oral Q4H PRN    naloxone (NARCAN) injection 0.4 mg  0.4 mg IntraVENous PRN    diphenhydrAMINE (BENADRYL) injection 12.5 mg  12.5 mg IntraVENous Q4H PRN  ondansetron (ZOFRAN) injection 4 mg  4 mg IntraVENous Q4H PRN    docusate sodium (COLACE) capsule 100 mg  100 mg Oral BID    nicotine (NICODERM CQ) 14 mg/24 hr patch 1 Patch  1 Patch TransDERmal Q24H    heparin (porcine) injection 5,000 Units  5,000 Units SubCUTAneous Q8H    amLODIPine (NORVASC) tablet 10 mg  10 mg Oral DAILY    omeprazole (PRILOSEC) capsule 20 mg  20 mg Oral DAILY    colchicine tablet 0.6 mg  0.6 mg Oral DAILY    lipase-protease-amylase (ZENPEP 10,000) capsule 1 Cap  1 Cap Oral TID    lisinopril (PRINIVIL, ZESTRIL) tablet 40 mg  40 mg Oral DAILY    triamcinolone acetonide (KENALOG) 0.1 % cream   Topical BID    sodium chloride (NS) flush 5-40 mL  5-40 mL IntraVENous PRN    LORazepam (ATIVAN) tablet 1 mg  1 mg Oral Q1H PRN    Or    LORazepam (ATIVAN) injection 1 mg  1 mg IntraVENous Q1H PRN    LORazepam (ATIVAN) tablet 2 mg  2 mg Oral Q1H PRN    Or    LORazepam (ATIVAN) injection 2 mg  2 mg IntraVENous Q1H PRN    LORazepam (ATIVAN) injection 3 mg  3 mg IntraVENous Q15MIN PRN    thiamine mononitrate (B-1) tablet 100 mg  100 mg Oral DAILY    folic acid (FOLVITE) tablet 1 mg  1 mg Oral DAILY    heparin (porcine) 100 unit/mL injection 500 Units  500 Units InterCATHeter Q8H PRN        Exam:  General:  alert, cooperative, no distress, appears older than stated age  Eye:  negative findings: anicteric sclera  Lungs:  clear to auscultation bilaterally  Heart:  regular rate and rhythm, S1, S2 normal, no murmur, click, rub or gallop  Skin:  PICC L upper arm without erythema/induration/DC    Data Review:   CBC: No results for input(s): WBC, RBC, HGB, HCT, PLT, GRANS, LYMPH, EOS, HGBEXT, HCTEXT, PLTEXT in the last 72 hours.   CMP:   Recent Labs     02/10/19  0250 02/09/19  0346   GLU 98 105*    141   K 3.4* 3.4*    105   CO2 28 28   BUN 9 7   CREA 1.29 0.81   CA 8.3* 8.4*   AGAP 8 8   BUCR 7* 9*     Liver Enzymes: No results for input(s): TP, ALB, TBIL, AP, SGOT, GPT in the last 72 hours.     No lab exists for component: DBIL  Inflammation studies: No results found for: SR, ESRA, CRP    Signed By: Meena Cronin MD     February 11, 2019

## 2019-02-11 NOTE — PROGRESS NOTES
1859- Critical Vanc tough of 23.9 called by Celeste Ng in the lab. Notified Shelvia Cogan in the pharmacy who stated that vancomycin has been d/c'd. 
1940- Dressing to left foot and removed. Incision CDI with red wound bed. Stitches intact. Doppler used to find pulses. Pedal pulse unable to be found for post tibial pulse found with doppler. Great toe black, swollen. Other toes normal color but swollen and very painful to touch. Foot cool to touch. Pain 10/10 with slightest touch.

## 2019-02-11 NOTE — ROUTINE PROCESS
Bedside shift change report given to Renan Moore RN (oncoming nurse) by Mary Santana RN (offgoing nurse). Report included the following information SBAR, Kardex, Intake/Output, MAR, Recent Results and Alarm Parameters .

## 2019-02-12 ENCOUNTER — HOME CARE VISIT (OUTPATIENT)
Dept: HOME HEALTH SERVICES | Facility: HOME HEALTH | Age: 59
End: 2019-02-12

## 2019-02-12 ENCOUNTER — HOSPITAL ENCOUNTER (OUTPATIENT)
Dept: INFUSION THERAPY | Age: 59
Discharge: HOME | End: 2019-02-12
Payer: MEDICARE

## 2019-02-12 VITALS
OXYGEN SATURATION: 100 % | WEIGHT: 105 LBS | RESPIRATION RATE: 18 BRPM | BODY MASS INDEX: 17.93 KG/M2 | HEART RATE: 96 BPM | SYSTOLIC BLOOD PRESSURE: 189 MMHG | TEMPERATURE: 98.6 F | HEIGHT: 64 IN | DIASTOLIC BLOOD PRESSURE: 98 MMHG

## 2019-02-12 LAB
BACTERIA SPEC CULT: NORMAL
SERVICE CMNT-IMP: NORMAL

## 2019-02-12 PROCEDURE — 74011250636 HC RX REV CODE- 250/636: Performed by: INTERNAL MEDICINE

## 2019-02-12 PROCEDURE — 74011000250 HC RX REV CODE- 250: Performed by: INTERNAL MEDICINE

## 2019-02-12 PROCEDURE — 96374 THER/PROPH/DIAG INJ IV PUSH: CPT

## 2019-02-12 RX ORDER — HEPARIN 100 UNIT/ML
500 SYRINGE INTRAVENOUS ONCE
Status: COMPLETED | OUTPATIENT
Start: 2019-02-12 | End: 2019-02-12

## 2019-02-12 RX ORDER — SODIUM CHLORIDE 0.9 % (FLUSH) 0.9 %
10-40 SYRINGE (ML) INJECTION AS NEEDED
Status: DISCONTINUED | OUTPATIENT
Start: 2019-02-12 | End: 2019-02-16 | Stop reason: HOSPADM

## 2019-02-12 RX ADMIN — HEPARIN 500 UNITS: 100 SYRINGE at 13:34

## 2019-02-12 RX ADMIN — CEFTRIAXONE SODIUM 2 G: 2 INJECTION, POWDER, FOR SOLUTION INTRAMUSCULAR; INTRAVENOUS at 13:29

## 2019-02-12 RX ADMIN — Medication 30 ML: at 13:34

## 2019-02-12 NOTE — PROGRESS NOTES
[facility] Progress Note Date: 2019 Name: [facility] MRN: [id number] : 1960 Rocephin Infusion Mr. Anastacia Del Rio to [address] via wheelchair at 1310 accompanied by his wife. Pt was assessed and education was provided. Mr Areli Zapata reports that he did not take his amlodipine this morning and that he is in pain. He reports that he has not taken pain medication today. Mr. Demarco Tim vitals were reviewed. Visit Vitals BP (!) 189/98 (BP 1 Location: Right arm) Pulse 96 Temp 98.6 °F (37 °C) Resp 18 Ht 5' 4\" (1.626 m) Wt 47.6 kg (105 lb) SpO2 100% BMI 18.02 kg/m² Left upper arm PICC flushed easily and had brisk blood return via both ports. PICC dressing c/d/i and not due to be changed. No swelling, redness, streaking, warmth or drainage noted in arm. Pt denied c/o pain in arm. Dressing reinforced. []  Vancomycin  
  []  Invanz  
  []  Cubicin  
  [x]  Rocephin 2 grams Was infused slowly over 3 minutes. Mr. Anastacia Del Rio tolerated infusion, and had no complaints at this time. Both lumens of PICC flushed with NS 10 ml and Heparin 250 units. Green end caps applied, and lumens wrapped with guaze and paper tape. Stockinette placed over dressing for protection. Patient remained in the infusion center for 30 minutes for observation. No signs of allergic reaction. Patient armband removed and shredded. Mr. Anastacia Del Rio was discharged from [place] [address] in stable condition at 1405. He is to return on 19  for his next antibiotic appointment. Sydney Riley RN 2019

## 2019-02-13 ENCOUNTER — HOSPITAL ENCOUNTER (OUTPATIENT)
Dept: INFUSION THERAPY | Age: 59
Discharge: HOME | End: 2019-02-13
Payer: MEDICARE

## 2019-02-13 ENCOUNTER — HOSPITAL ENCOUNTER (OUTPATIENT)
Dept: WOUND CARE | Age: 59
Discharge: HOME | End: 2019-02-13
Payer: MEDICARE

## 2019-02-13 ENCOUNTER — HOME CARE VISIT (OUTPATIENT)
Dept: HOME HEALTH SERVICES | Facility: HOME HEALTH | Age: 59
End: 2019-02-13

## 2019-02-13 VITALS
HEART RATE: 99 BPM | OXYGEN SATURATION: 100 % | RESPIRATION RATE: 18 BRPM | DIASTOLIC BLOOD PRESSURE: 93 MMHG | TEMPERATURE: 98.7 F | SYSTOLIC BLOOD PRESSURE: 147 MMHG

## 2019-02-13 VITALS
HEART RATE: 99 BPM | OXYGEN SATURATION: 100 % | DIASTOLIC BLOOD PRESSURE: 86 MMHG | SYSTOLIC BLOOD PRESSURE: 157 MMHG | RESPIRATION RATE: 18 BRPM | TEMPERATURE: 98.1 F

## 2019-02-13 LAB
HIV 1+2 AB+HIV1 P24 AG SERPL QL IA: NONREACTIVE
HIV12 RESULT COMMENT, HHIVC: NORMAL

## 2019-02-13 PROCEDURE — 74011250636 HC RX REV CODE- 250/636: Performed by: INTERNAL MEDICINE

## 2019-02-13 PROCEDURE — 99211 OFF/OP EST MAY X REQ PHY/QHP: CPT

## 2019-02-13 PROCEDURE — 96374 THER/PROPH/DIAG INJ IV PUSH: CPT

## 2019-02-13 PROCEDURE — 74011000250 HC RX REV CODE- 250: Performed by: INTERNAL MEDICINE

## 2019-02-13 RX ORDER — SODIUM CHLORIDE 0.9 % (FLUSH) 0.9 %
10 SYRINGE (ML) INJECTION AS NEEDED
Status: DISCONTINUED | OUTPATIENT
Start: 2019-02-13 | End: 2019-02-17 | Stop reason: HOSPADM

## 2019-02-13 RX ORDER — HEPARIN 100 UNIT/ML
500 SYRINGE INTRAVENOUS ONCE
Status: COMPLETED | OUTPATIENT
Start: 2019-02-13 | End: 2019-02-13

## 2019-02-13 RX ADMIN — SODIUM CHLORIDE, PRESERVATIVE FREE 500 UNITS: 5 INJECTION INTRAVENOUS at 10:26

## 2019-02-13 RX ADMIN — Medication 10 ML: at 10:23

## 2019-02-13 RX ADMIN — CEFTRIAXONE SODIUM 2 G: 2 INJECTION, POWDER, FOR SOLUTION INTRAMUSCULAR; INTRAVENOUS at 10:24

## 2019-02-13 NOTE — WOUND CARE
Infectious Disease Progress Note    Admit Date: 2019    Impression:   · MSSA OM L great toe    Plan:   CAX #3/35 - POD #7    Plan on going full 6wks from POD date (as he received other abx before switching to current CAX on Monday this week). Doing well. RTC either 6 or 13MAR to see me (termination date )    Jeremy Cardona MD FACP  Infectious Diseases  Cell [phone]    Anti-infectives:   1. CAX 2gm IVPB daily    Subjective:   Last seen in hospital on Monday this week (new consult then). Since [address] home and doing well. Has f/u with Dr Chhaya Casarez this Friday. Pain controlled. PICC doing well. NO abd pain. Review of Systems:  Constitutional: negative for fevers, chills, sweats and anorexia  Respiratory: negative for cough or sputum  Cardiovascular: negative for chest pain, dyspnea  Gastrointestinal: negative for nausea, vomiting, diarrhea, abdominal pain and jaundice    Objective:   Blood pressure 157/86, pulse 99, temperature 98.1 °F (36.7 °C), resp. rate 18, SpO2 100 %. Temp (24hrs), Av.1 °F (36.7 °C), Min:98.1 °F (36.7 °C), Max:98.1 °F (36.7 °C)      Lines:  PICC:       Physical Exam: General:  alert, cooperative, no distress, appears stated age  Eye:  negative findings: anicteric sclera  Skin:  PICC in L upper arm - no erythema/induration/DC    Data Review:   CBC: No results for input(s): WBC, RBC, HGB, HCT, PLT, GRANS, LYMPH, EOS, HGBEXT, HCTEXT, PLTEXT in the last 72 hours. CMP: No results for input(s): GLU, NA, K, CL, CO2, BUN, CREA, CA, AGAP, BUCR, TBIL, GPT, AP, TP, ALB, GLOB, AGRAT in the last 72 hours. Liver Enzymes: No results for input(s): TP, ALB, TBIL, AP, SGOT, GPT in the last 72 hours.     No lab exists for component: DBIL  Inflammation studies:   Lab Results   Component Value Date/Time    C-Reactive protein 3.7 (H) 2019 04:55 AM       Microbiology:   All Micro Results     None

## 2019-02-13 NOTE — PROGRESS NOTES
[facility] Progress Note Date: 2019 Name: [facility] MRN: [id number] : 1960 Mr. Unique Zimmer was assessed and education was provided. Pt is here for day 2 of Rocephin, denies any reactions or complications from yesterday's treatment,  States \" pain is at a 8 today\" requesting a new kind of pain medication. Explained to patient that he will need to discuss with physician that we do not offer pain medication here. PICC line dressing unattached, In sterile technique, removed old dressing and cleansed site with Choraprep, replaced biopatch , no drainage, redness, or warmth at site,  Covered with a tegaderm dressing,  Pt tolerated well. Mr. Isatu Christopher vitals were reviewed. Visit Vitals BP (!) 147/93 (BP 1 Location: Right arm) Pulse 99 Temp 98.7 °F (37.1 °C) Resp 18 SpO2 100% Flushed bilateral lumens with 5 ml normal saline and brisk blood return verified,  
 
  []  Vancomycin  
  []  Invanz  
  []  Cubicin  
  [x]  Rocephin 2 grams  
 was infused over 3 minutes. Flushed bilateral lumens with 5 ml normal saline then instilled 250 units Heparin, covered lumens with green curos caps, then covered with 4x4, paper tape and stockinette. Mr. Unique Zimmer tolerated infusion, and had no complaints at this time. Patient armband removed and shredded. Mr. Unique Zimmer was discharged from [place] [address] in stable condition via wheelchair at [phone]. He is to return on 2018 at 1100 for his next appointment. Carol Dobbins RN 2019 
2:44 PM

## 2019-02-14 ENCOUNTER — HOME CARE VISIT (OUTPATIENT)
Dept: HOME HEALTH SERVICES | Facility: HOME HEALTH | Age: 59
End: 2019-02-14
Payer: MEDICARE

## 2019-02-14 ENCOUNTER — HOME CARE VISIT (OUTPATIENT)
Dept: SCHEDULING | Facility: HOME HEALTH | Age: 59
End: 2019-02-14
Payer: MEDICARE

## 2019-02-14 ENCOUNTER — HOME CARE VISIT (OUTPATIENT)
Dept: HOME HEALTH SERVICES | Facility: HOME HEALTH | Age: 59
End: 2019-02-14

## 2019-02-14 ENCOUNTER — HOSPITAL ENCOUNTER (OUTPATIENT)
Dept: INFUSION THERAPY | Age: 59
Discharge: HOME | End: 2019-02-14
Payer: MEDICARE

## 2019-02-14 VITALS
OXYGEN SATURATION: 100 % | DIASTOLIC BLOOD PRESSURE: 96 MMHG | RESPIRATION RATE: 18 BRPM | TEMPERATURE: 97.9 F | SYSTOLIC BLOOD PRESSURE: 153 MMHG

## 2019-02-14 VITALS
OXYGEN SATURATION: 100 % | SYSTOLIC BLOOD PRESSURE: 170 MMHG | DIASTOLIC BLOOD PRESSURE: 116 MMHG | HEART RATE: 92 BPM | TEMPERATURE: 96.9 F | RESPIRATION RATE: 14 BRPM

## 2019-02-14 PROCEDURE — G0299 HHS/HOSPICE OF RN EA 15 MIN: HCPCS

## 2019-02-14 PROCEDURE — 96374 THER/PROPH/DIAG INJ IV PUSH: CPT

## 2019-02-14 PROCEDURE — 74011000250 HC RX REV CODE- 250: Performed by: INTERNAL MEDICINE

## 2019-02-14 PROCEDURE — 3331090002 HH PPS REVENUE DEBIT

## 2019-02-14 PROCEDURE — 400013 HH SOC

## 2019-02-14 PROCEDURE — 3331090001 HH PPS REVENUE CREDIT

## 2019-02-14 PROCEDURE — 74011250636 HC RX REV CODE- 250/636: Performed by: INTERNAL MEDICINE

## 2019-02-14 RX ORDER — HEPARIN 100 UNIT/ML
500 SYRINGE INTRAVENOUS ONCE
Status: DISPENSED | OUTPATIENT
Start: 2019-02-14 | End: 2019-02-15

## 2019-02-14 RX ORDER — SODIUM CHLORIDE 0.9 % (FLUSH) 0.9 %
10-40 SYRINGE (ML) INJECTION AS NEEDED
Status: DISCONTINUED | OUTPATIENT
Start: 2019-02-14 | End: 2019-02-18 | Stop reason: HOSPADM

## 2019-02-14 RX ADMIN — CEFTRIAXONE SODIUM 2 G: 2 INJECTION, POWDER, FOR SOLUTION INTRAMUSCULAR; INTRAVENOUS at 13:02

## 2019-02-14 RX ADMIN — Medication 30 ML: at 13:01

## 2019-02-14 NOTE — PROGRESS NOTES
[facility]: 2019 Name: [facility] MRN: [id number] : 1960 Rocephin Infusion Mr. Gi Roman to [facility], via wheelchair at [phone] accompanied by his wife. Pt was assessed and education was provided. Mr. Ariadna Montoya vitals were reviewed. Visit Vitals BP (!) 153/96 (BP 1 Location: Right arm) Temp 97.9 °F (36.6 °C) Resp 18 SpO2 100% Left upper arm PICC flushed easily and had brisk blood return via both ports.  
 
 
 
  []  Vancomycin  
  []  Invanz  
  []  Cubicin  
  [x]  Rocephin 2 grams  
 was infused slowly over approximately 3 minutes. Mr. Gi Roman tolerated infusion, and had no complaints at this time. Both lumens of PICC flushed with NS 10 ml and Heparin 250 units. Green end caps applied, and lumens wrapped with guaze and paper tape. Stockinette placed over dressing for protection. Patient armband removed and shredded. Mr. Gi Roman was discharged from [place] [address] in stable condition at 1310. He is to return on 2/15/19 for his next antibiotic appointment. Sole Spring RN 2019

## 2019-02-15 ENCOUNTER — HOME CARE VISIT (OUTPATIENT)
Dept: HOME HEALTH SERVICES | Facility: HOME HEALTH | Age: 59
End: 2019-02-15
Payer: MEDICARE

## 2019-02-15 ENCOUNTER — HOSPITAL ENCOUNTER (OUTPATIENT)
Dept: INFUSION THERAPY | Age: 59
Discharge: HOME | End: 2019-02-15
Payer: MEDICARE

## 2019-02-15 VITALS
RESPIRATION RATE: 18 BRPM | DIASTOLIC BLOOD PRESSURE: 98 MMHG | OXYGEN SATURATION: 97 % | TEMPERATURE: 98.7 F | SYSTOLIC BLOOD PRESSURE: 141 MMHG | HEART RATE: 96 BPM

## 2019-02-15 PROCEDURE — 96374 THER/PROPH/DIAG INJ IV PUSH: CPT

## 2019-02-15 PROCEDURE — 3331090002 HH PPS REVENUE DEBIT

## 2019-02-15 PROCEDURE — 74011000250 HC RX REV CODE- 250: Performed by: INTERNAL MEDICINE

## 2019-02-15 PROCEDURE — 74011250636 HC RX REV CODE- 250/636: Performed by: INTERNAL MEDICINE

## 2019-02-15 PROCEDURE — 3331090001 HH PPS REVENUE CREDIT

## 2019-02-15 RX ORDER — SODIUM CHLORIDE 0.9 % (FLUSH) 0.9 %
5-10 SYRINGE (ML) INJECTION AS NEEDED
Status: DISCONTINUED | OUTPATIENT
Start: 2019-02-15 | End: 2019-02-19 | Stop reason: HOSPADM

## 2019-02-15 RX ORDER — HEPARIN 100 UNIT/ML
500 SYRINGE INTRAVENOUS ONCE
Status: COMPLETED | OUTPATIENT
Start: 2019-02-15 | End: 2019-02-15

## 2019-02-15 RX ADMIN — CEFTRIAXONE SODIUM 2 G: 2 INJECTION, POWDER, FOR SOLUTION INTRAMUSCULAR; INTRAVENOUS at 15:00

## 2019-02-15 RX ADMIN — Medication 10 ML: at 15:05

## 2019-02-15 RX ADMIN — Medication 500 UNITS: at 15:05

## 2019-02-15 NOTE — PROGRESS NOTES
[facility] Progress Note Date: February 15, 2019 Name: [facility] MRN: [id number] : 1960 Mr. William Ospina was assessed and education was provided. Mr. Edgar Higginbotham vitals were reviewed. Visit Vitals BP (!) 141/98 (BP 1 Location: Right arm, BP Patient Position: Sitting) Pulse 96 Temp 98.7 °F (37.1 °C) Resp 18 SpO2 97% Lab results were obtained and reviewed. No results found for this or any previous visit (from the past 12 hour(s)). []  Vancomycin  
  []  Invanz  
  []  Cubicin  
  [x]  Rocephin 2 grams IV push  
 was infused at per orders without complications. Mr. William Ospina tolerated infusion, and had no complaints at this time. Patient armband removed and shredded. Mr. William Ospina was discharged from [place] [address] in stable condition at 1510. He is to return on 19 at 0830 for his next appointment. Armband removed and shredded. Thad Simon RN February 15, 2019 
3:16 PM

## 2019-02-16 ENCOUNTER — HOSPITAL ENCOUNTER (OUTPATIENT)
Dept: INFUSION THERAPY | Age: 59
Discharge: HOME | End: 2019-02-16
Payer: MEDICARE

## 2019-02-16 VITALS
HEART RATE: 94 BPM | SYSTOLIC BLOOD PRESSURE: 151 MMHG | TEMPERATURE: 98.2 F | RESPIRATION RATE: 18 BRPM | DIASTOLIC BLOOD PRESSURE: 101 MMHG

## 2019-02-16 PROCEDURE — 96374 THER/PROPH/DIAG INJ IV PUSH: CPT

## 2019-02-16 PROCEDURE — 74011250636 HC RX REV CODE- 250/636: Performed by: INTERNAL MEDICINE

## 2019-02-16 PROCEDURE — 74011000250 HC RX REV CODE- 250: Performed by: INTERNAL MEDICINE

## 2019-02-16 PROCEDURE — 3331090002 HH PPS REVENUE DEBIT

## 2019-02-16 PROCEDURE — 3331090001 HH PPS REVENUE CREDIT

## 2019-02-16 RX ORDER — HEPARIN 100 UNIT/ML
500 SYRINGE INTRAVENOUS ONCE
Status: COMPLETED | OUTPATIENT
Start: 2019-02-16 | End: 2019-02-16

## 2019-02-16 RX ORDER — HEPARIN 100 UNIT/ML
SYRINGE INTRAVENOUS
Status: DISPENSED
Start: 2019-02-16 | End: 2019-02-16

## 2019-02-16 RX ORDER — SODIUM CHLORIDE 0.9 % (FLUSH) 0.9 %
10 SYRINGE (ML) INJECTION AS NEEDED
Status: DISCONTINUED | OUTPATIENT
Start: 2019-02-16 | End: 2019-02-20 | Stop reason: HOSPADM

## 2019-02-16 RX ADMIN — SODIUM CHLORIDE, PRESERVATIVE FREE 500 UNITS: 5 INJECTION INTRAVENOUS at 08:36

## 2019-02-16 RX ADMIN — CEFTRIAXONE SODIUM 2 G: 2 INJECTION, POWDER, FOR SOLUTION INTRAMUSCULAR; INTRAVENOUS at 08:31

## 2019-02-16 RX ADMIN — Medication 10 ML: at 08:30

## 2019-02-16 RX ADMIN — Medication 10 ML: at 08:35

## 2019-02-16 NOTE — PROGRESS NOTES
\[facility]\"" Progress Note Date: 2019 Name: [facility] MRN: [id number] : 1960 Mr. Ursula Carrillo was assessed and education was provided. Mr. Josue Hawley vitals were reviewed. Pt states that he \" just took pain medication and blood pressure medication, that is why bp is so high Visit Vitals BP (!) 151/101 (BP 1 Location: Right arm, BP Patient Position: Sitting) Pulse 94 Temp 98.2 °F (36.8 °C) Resp 18  
 
 
 
 
 
  []  Vancomycin  
  []  Invanz  
  []  Cubicin  
  [x]  Rocephin 2 grams IV push  
 was infused at per orders without complications. Flushed bilateral lines with 5 ml normal saline, then instilled 250 units in each, covered with green curos cap, 4x4, and stockinette Mr. Ursula Carrillo tolerated infusion, and had no complaints at this time. Patient armband removed and shredded. Mr. Ursula Carrillo was discharged from [place] [address] in stable condition at [place] [address] [facility] He is to return on 1719 at 0900 for his next appointment. Armband removed and shredded. Kaz Lee RN 2019 
3:16 PM

## 2019-02-17 ENCOUNTER — HOSPITAL ENCOUNTER (OUTPATIENT)
Dept: INFUSION THERAPY | Age: 59
Discharge: HOME | End: 2019-02-17
Payer: MEDICARE

## 2019-02-17 VITALS
DIASTOLIC BLOOD PRESSURE: 100 MMHG | SYSTOLIC BLOOD PRESSURE: 150 MMHG | HEART RATE: 99 BPM | RESPIRATION RATE: 18 BRPM | TEMPERATURE: 97.8 F | OXYGEN SATURATION: 98 %

## 2019-02-17 PROCEDURE — 74011250636 HC RX REV CODE- 250/636

## 2019-02-17 PROCEDURE — 3331090001 HH PPS REVENUE CREDIT

## 2019-02-17 PROCEDURE — 74011000250 HC RX REV CODE- 250: Performed by: INTERNAL MEDICINE

## 2019-02-17 PROCEDURE — 96374 THER/PROPH/DIAG INJ IV PUSH: CPT

## 2019-02-17 PROCEDURE — 74011250636 HC RX REV CODE- 250/636: Performed by: INTERNAL MEDICINE

## 2019-02-17 PROCEDURE — 3331090002 HH PPS REVENUE DEBIT

## 2019-02-17 RX ORDER — SODIUM CHLORIDE 0.9 % (FLUSH) 0.9 %
10 SYRINGE (ML) INJECTION AS NEEDED
Status: DISCONTINUED | OUTPATIENT
Start: 2019-02-17 | End: 2019-02-21 | Stop reason: HOSPADM

## 2019-02-17 RX ORDER — HEPARIN 100 UNIT/ML
500 SYRINGE INTRAVENOUS ONCE
Status: ACTIVE | OUTPATIENT
Start: 2019-02-17 | End: 2019-02-17

## 2019-02-17 RX ORDER — HEPARIN 100 UNIT/ML
SYRINGE INTRAVENOUS
Status: COMPLETED
Start: 2019-02-17 | End: 2019-02-17

## 2019-02-17 RX ADMIN — Medication 10 ML: at 08:26

## 2019-02-17 RX ADMIN — Medication 10 ML: at 08:29

## 2019-02-17 RX ADMIN — CEFTRIAXONE SODIUM 2 G: 2 INJECTION, POWDER, FOR SOLUTION INTRAMUSCULAR; INTRAVENOUS at 08:27

## 2019-02-17 RX ADMIN — SODIUM CHLORIDE, PRESERVATIVE FREE 500 UNITS: 5 INJECTION INTRAVENOUS at 08:30

## 2019-02-17 NOTE — PROGRESS NOTES
[facility] Progress Note Date: 2019 Name: [facility] MRN: [id number] : 1960 Mr. Julio Cesar Kelly was assessed and education was provided. Mr. Charisse Parra vitals were reviewed. Pt states that he \" just took pain medication and blood pressure medication, that is why bp is so high. States \" mu leg was in pain all night, and I could not sleep. \"  Advised pt to elevate leg today and rest.   
Visit Vitals BP (!) 150/100 (BP 1 Location: Right arm, BP Patient Position: Sitting) Pulse 99 Temp 97.8 °F (36.6 °C) Resp 18 SpO2 98%  
 
 
 
 
 
  []  Vancomycin  
  []  Invanz  
  []  Cubicin  
  [x]  Rocephin 2 grams IV push  
 was infused at per orders without complications. Flushed bilateral lines with 5 ml normal saline, then instilled 250 units in each, covered with green curos cap, 4x4, and stockinette Mr. Julio Cesar Kelly tolerated infusion, and had no complaints at this time. Patient armband removed and shredded. Mr. Julio Cesar Kelly was discharged from [place] [address] in stable condition at 3970 He is to return on 19 at 1330 for his next appointment. Armband removed and shredded. Summer Van RN 2019

## 2019-02-18 ENCOUNTER — HOSPITAL ENCOUNTER (OUTPATIENT)
Dept: INFUSION THERAPY | Age: 59
Discharge: HOME | End: 2019-02-18
Payer: MEDICARE

## 2019-02-18 ENCOUNTER — HOME CARE VISIT (OUTPATIENT)
Dept: SCHEDULING | Facility: HOME HEALTH | Age: 59
End: 2019-02-18
Payer: MEDICARE

## 2019-02-18 VITALS
DIASTOLIC BLOOD PRESSURE: 97 MMHG | TEMPERATURE: 99.1 F | SYSTOLIC BLOOD PRESSURE: 149 MMHG | RESPIRATION RATE: 18 BRPM | OXYGEN SATURATION: 99 % | HEART RATE: 85 BPM

## 2019-02-18 VITALS
SYSTOLIC BLOOD PRESSURE: 148 MMHG | DIASTOLIC BLOOD PRESSURE: 78 MMHG | TEMPERATURE: 97.5 F | OXYGEN SATURATION: 99 % | HEART RATE: 76 BPM

## 2019-02-18 LAB
ALBUMIN SERPL-MCNC: 3.2 G/DL (ref 3.4–5)
ALBUMIN/GLOB SERPL: 0.7 {RATIO} (ref 0.8–1.7)
ALP SERPL-CCNC: 140 U/L (ref 45–117)
ALT SERPL-CCNC: 22 U/L (ref 16–61)
ANION GAP SERPL CALC-SCNC: 8 MMOL/L (ref 3–18)
AST SERPL-CCNC: 25 U/L (ref 15–37)
BASO+EOS+MONOS # BLD AUTO: 1.9 K/UL (ref 0–2.3)
BASO+EOS+MONOS NFR BLD AUTO: 16 % (ref 0.1–17)
BILIRUB SERPL-MCNC: 0.3 MG/DL (ref 0.2–1)
BUN SERPL-MCNC: 9 MG/DL (ref 7–18)
BUN/CREAT SERPL: 6 (ref 12–20)
CALCIUM SERPL-MCNC: 9 MG/DL (ref 8.5–10.1)
CHLORIDE SERPL-SCNC: 98 MMOL/L (ref 100–108)
CO2 SERPL-SCNC: 28 MMOL/L (ref 21–32)
CREAT SERPL-MCNC: 1.43 MG/DL (ref 0.6–1.3)
CRP SERPL-MCNC: 3 MG/DL (ref 0–0.3)
DIFFERENTIAL METHOD BLD: ABNORMAL
ERYTHROCYTE [DISTWIDTH] IN BLOOD BY AUTOMATED COUNT: 11.6 % (ref 11.5–14.5)
GLOBULIN SER CALC-MCNC: 4.3 G/DL (ref 2–4)
GLUCOSE SERPL-MCNC: 121 MG/DL (ref 74–99)
HCT VFR BLD AUTO: 35.6 % (ref 36–48)
HGB BLD-MCNC: 11.2 G/DL (ref 12–16)
LYMPHOCYTES # BLD: 0.8 K/UL (ref 1.1–5.9)
LYMPHOCYTES NFR BLD: 7 % (ref 14–44)
MCH RBC QN AUTO: 28.4 PG (ref 25–35)
MCHC RBC AUTO-ENTMCNC: 31.5 G/DL (ref 31–37)
MCV RBC AUTO: 90.1 FL (ref 78–102)
NEUTS SEG # BLD: 9.2 K/UL (ref 1.8–9.5)
NEUTS SEG NFR BLD: 78 % (ref 40–70)
PLATELET # BLD AUTO: 480 K/UL (ref 140–440)
POTASSIUM SERPL-SCNC: 4.2 MMOL/L (ref 3.5–5.5)
PROT SERPL-MCNC: 7.5 G/DL (ref 6.4–8.2)
RBC # BLD AUTO: 3.95 M/UL (ref 4.1–5.1)
SODIUM SERPL-SCNC: 134 MMOL/L (ref 136–145)
WBC # BLD AUTO: 11.9 K/UL (ref 4.5–13)

## 2019-02-18 PROCEDURE — 74011250636 HC RX REV CODE- 250/636: Performed by: INTERNAL MEDICINE

## 2019-02-18 PROCEDURE — 74011000250 HC RX REV CODE- 250: Performed by: INTERNAL MEDICINE

## 2019-02-18 PROCEDURE — 80053 COMPREHEN METABOLIC PANEL: CPT

## 2019-02-18 PROCEDURE — G0152 HHCP-SERV OF OT,EA 15 MIN: HCPCS

## 2019-02-18 PROCEDURE — 3331090002 HH PPS REVENUE DEBIT

## 2019-02-18 PROCEDURE — 96374 THER/PROPH/DIAG INJ IV PUSH: CPT

## 2019-02-18 PROCEDURE — 85025 COMPLETE CBC W/AUTO DIFF WBC: CPT

## 2019-02-18 PROCEDURE — G0151 HHCP-SERV OF PT,EA 15 MIN: HCPCS

## 2019-02-18 PROCEDURE — 3331090001 HH PPS REVENUE CREDIT

## 2019-02-18 PROCEDURE — 86140 C-REACTIVE PROTEIN: CPT

## 2019-02-18 RX ORDER — HEPARIN 100 UNIT/ML
500 SYRINGE INTRAVENOUS AS NEEDED
Status: DISCONTINUED | OUTPATIENT
Start: 2019-02-18 | End: 2019-02-22 | Stop reason: HOSPADM

## 2019-02-18 RX ORDER — HEPARIN 100 UNIT/ML
SYRINGE INTRAVENOUS
Status: DISPENSED
Start: 2019-02-18 | End: 2019-02-19

## 2019-02-18 RX ORDER — SODIUM CHLORIDE 0.9 % (FLUSH) 0.9 %
10-40 SYRINGE (ML) INJECTION AS NEEDED
Status: DISCONTINUED | OUTPATIENT
Start: 2019-02-18 | End: 2019-02-22 | Stop reason: HOSPADM

## 2019-02-18 RX ADMIN — Medication 20 ML: at 13:56

## 2019-02-18 RX ADMIN — Medication 20 ML: at 13:45

## 2019-02-18 RX ADMIN — CEFTRIAXONE SODIUM 2 G: 2 INJECTION, POWDER, FOR SOLUTION INTRAMUSCULAR; INTRAVENOUS at 13:45

## 2019-02-18 RX ADMIN — Medication 500 UNITS: at 13:56

## 2019-02-18 NOTE — PROGRESS NOTES
[facility] Progress Note Date: 2019 Name: [facility] MRN: [id number] : 1960 Daily Rocephin infusion Mr. Ilene Brian was assessed and education was provided. Mr. Nicole Leslie vitals were reviewed. Visit Vitals BP (!) 149/97 (BP 1 Location: Right arm, BP Patient Position: Sitting) Pulse 85 Temp 99.1 °F (37.3 °C) Resp 18 SpO2 99% Good blood return obtained from red lumen of PICC line at left upper arm. Labs (CBC. CMP, CRP) were drawn from red lumen, followed with 20 ml NS flush. Lab results were obtained and reviewed. Recent Results (from the past 12 hour(s)) CBC WITH 3 PART DIFF Collection Time: 19  1:45 PM  
Result Value Ref Range WBC 11.9 4.5 - 13.0 K/uL  
 RBC 3.95 (L) 4.10 - 5.10 M/uL  
 HGB 11.2 (L) 12.0 - 16.0 g/dL HCT 35.6 (L) 36 - 48 % MCV 90.1 78 - 102 FL  
 MCH 28.4 25.0 - 35.0 PG  
 MCHC 31.5 31 - 37 g/dL  
 RDW 11.6 11.5 - 14.5 % PLATELET 220 (H) 561 - 440 K/uL NEUTROPHILS 78 (H) 40 - 70 % MIXED CELLS 16 0.1 - 17 % LYMPHOCYTES 7 (L) 14 - 44 % ABS. NEUTROPHILS 9.2 1.8 - 9.5 K/UL  
 ABS. MIXED CELLS 1.9 0.0 - 2.3 K/uL  
 ABS. LYMPHOCYTES 0.8 (L) 1.1 - 5.9 K/UL  
 DF AUTOMATED METABOLIC PANEL, COMPREHENSIVE Collection Time: 19  1:45 PM  
Result Value Ref Range Sodium 134 (L) 136 - 145 mmol/L Potassium 4.2 3.5 - 5.5 mmol/L Chloride 98 (L) 100 - 108 mmol/L  
 CO2 28 21 - 32 mmol/L Anion gap 8 3.0 - 18 mmol/L Glucose 121 (H) 74 - 99 mg/dL BUN 9 7.0 - 18 MG/DL Creatinine 1.43 (H) 0.6 - 1.3 MG/DL  
 BUN/Creatinine ratio 6 (L) 12 - 20 GFR est AA >60 >60 ml/min/1.73m2 GFR est non-AA 50 (L) >60 ml/min/1.73m2 Calcium 9.0 8.5 - 10.1 MG/DL Bilirubin, total 0.3 0.2 - 1.0 MG/DL  
 ALT (SGPT) 22 16 - 61 U/L  
 AST (SGOT) 25 15 - 37 U/L Alk. phosphatase 140 (H) 45 - 117 U/L Protein, total 7.5 6.4 - 8.2 g/dL Albumin 3.2 (L) 3.4 - 5.0 g/dL Globulin 4.3 (H) 2.0 - 4.0 g/dL A-G Ratio 0.7 (L) 0.8 - 1.7 []  Vancomycin  
  []  Invanz  
  []  Cubicin  
  [x]  Rocephin 2 grams/20 ml   
 was infused via red lumen by slow IVP, followed with 10 ml NS flush. Good blood return obtaiend from purple lumen, followed with 10 ml NS flush. Each lumen flushed with 2.5 ml of 100 units/ml Heparin. New Curos caps placed, then lumens wrapped and secured. Dressing reinforced. Mr. Kolton Bolanos tolerated infusion, and had no complaints at this time. Patient armband removed and shredded. Mr. Kolton Bolanos was discharged from [place] [address] in stable condition at 1400. He is to return on 2/19/2019 at 1330 for his next appointment for Rocephin infusion. Skyler Briggs RN February 18, 2019 
3:44 PM

## 2019-02-19 ENCOUNTER — HOSPITAL ENCOUNTER (OUTPATIENT)
Dept: INFUSION THERAPY | Age: 59
Discharge: HOME | End: 2019-02-19
Payer: MEDICARE

## 2019-02-19 ENCOUNTER — HOME CARE VISIT (OUTPATIENT)
Dept: SCHEDULING | Facility: HOME HEALTH | Age: 59
End: 2019-02-19
Payer: MEDICARE

## 2019-02-19 VITALS — OXYGEN SATURATION: 98 % | HEART RATE: 92 BPM | DIASTOLIC BLOOD PRESSURE: 60 MMHG | SYSTOLIC BLOOD PRESSURE: 150 MMHG

## 2019-02-19 VITALS
SYSTOLIC BLOOD PRESSURE: 98 MMHG | HEART RATE: 104 BPM | OXYGEN SATURATION: 99 % | RESPIRATION RATE: 18 BRPM | DIASTOLIC BLOOD PRESSURE: 73 MMHG | TEMPERATURE: 99 F

## 2019-02-19 PROCEDURE — 74011000250 HC RX REV CODE- 250: Performed by: INTERNAL MEDICINE

## 2019-02-19 PROCEDURE — 74011250636 HC RX REV CODE- 250/636: Performed by: INTERNAL MEDICINE

## 2019-02-19 PROCEDURE — 96374 THER/PROPH/DIAG INJ IV PUSH: CPT

## 2019-02-19 PROCEDURE — 3331090002 HH PPS REVENUE DEBIT

## 2019-02-19 PROCEDURE — 74011250636 HC RX REV CODE- 250/636

## 2019-02-19 PROCEDURE — G0299 HHS/HOSPICE OF RN EA 15 MIN: HCPCS

## 2019-02-19 PROCEDURE — 3331090001 HH PPS REVENUE CREDIT

## 2019-02-19 RX ORDER — HEPARIN 100 UNIT/ML
SYRINGE INTRAVENOUS
Status: COMPLETED
Start: 2019-02-19 | End: 2019-02-19

## 2019-02-19 RX ORDER — HEPARIN 100 UNIT/ML
500 SYRINGE INTRAVENOUS ONCE
Status: ACTIVE | OUTPATIENT
Start: 2019-02-19 | End: 2019-02-20

## 2019-02-19 RX ORDER — SODIUM CHLORIDE 0.9 % (FLUSH) 0.9 %
10 SYRINGE (ML) INJECTION AS NEEDED
Status: DISCONTINUED | OUTPATIENT
Start: 2019-02-19 | End: 2019-02-23 | Stop reason: HOSPADM

## 2019-02-19 RX ADMIN — CEFTRIAXONE SODIUM 2 G: 2 INJECTION, POWDER, FOR SOLUTION INTRAMUSCULAR; INTRAVENOUS at 13:32

## 2019-02-19 RX ADMIN — SODIUM CHLORIDE, PRESERVATIVE FREE 500 UNITS: 5 INJECTION INTRAVENOUS at 13:35

## 2019-02-19 RX ADMIN — Medication 10 ML: at 13:34

## 2019-02-19 RX ADMIN — Medication 10 ML: at 13:31

## 2019-02-19 NOTE — PROGRESS NOTES
hospitals Progress Note Date: 2019 Name: [facility] MRN: [id number] : 1960 Mr. Mariella Vazuqez was assessed and education was provided. Mr. Warden Dunn vitals were reviewed Patient Vitals for the past 12 hrs: 
 Temp Pulse Resp BP SpO2  
19 1329 99 °F (37.2 °C) (!) 104 18 98/73 99 % []  Vancomycin  
  []  Invanz  
  []  Cubicin  
  [x]  Rocephin 2 grams IV push  
 was infused at per orders without complications. Flushed bilateral lines with 5 ml normal saline, then instilled 250 units in each, covered with green curos cap, 4x4, and stockinette Mr. Mariella Vazquez tolerated infusion, and had no complaints at this time. Patient armband removed and shredded. Mr. Mariella Vazquez was discharged from [place] [address] in stable condition at 1340. He is to return on 19 at 1430 for his next appointment. Armband removed and shredded. Roman Ac, CHAZ 2019

## 2019-02-20 ENCOUNTER — HOSPITAL ENCOUNTER (OUTPATIENT)
Dept: INFUSION THERAPY | Age: 59
Discharge: HOME | End: 2019-02-20
Payer: MEDICARE

## 2019-02-20 VITALS
RESPIRATION RATE: 18 BRPM | OXYGEN SATURATION: 100 % | TEMPERATURE: 99.1 F | HEART RATE: 93 BPM | DIASTOLIC BLOOD PRESSURE: 91 MMHG | SYSTOLIC BLOOD PRESSURE: 145 MMHG

## 2019-02-20 VITALS
HEART RATE: 84 BPM | OXYGEN SATURATION: 97 % | DIASTOLIC BLOOD PRESSURE: 74 MMHG | TEMPERATURE: 99 F | RESPIRATION RATE: 14 BRPM | SYSTOLIC BLOOD PRESSURE: 124 MMHG

## 2019-02-20 PROCEDURE — 77030020847 HC STATLOK BARD -A

## 2019-02-20 PROCEDURE — 74011250636 HC RX REV CODE- 250/636

## 2019-02-20 PROCEDURE — 74011000250 HC RX REV CODE- 250: Performed by: INTERNAL MEDICINE

## 2019-02-20 PROCEDURE — 3331090001 HH PPS REVENUE CREDIT

## 2019-02-20 PROCEDURE — 74011250636 HC RX REV CODE- 250/636: Performed by: INTERNAL MEDICINE

## 2019-02-20 PROCEDURE — 96374 THER/PROPH/DIAG INJ IV PUSH: CPT

## 2019-02-20 PROCEDURE — 3331090002 HH PPS REVENUE DEBIT

## 2019-02-20 RX ORDER — HEPARIN 100 UNIT/ML
500 SYRINGE INTRAVENOUS ONCE
Status: COMPLETED | OUTPATIENT
Start: 2019-02-20 | End: 2019-02-20

## 2019-02-20 RX ORDER — SODIUM CHLORIDE 0.9 % (FLUSH) 0.9 %
5-10 SYRINGE (ML) INJECTION AS NEEDED
Status: DISCONTINUED | OUTPATIENT
Start: 2019-02-20 | End: 2019-02-24 | Stop reason: HOSPADM

## 2019-02-20 RX ORDER — HEPARIN 100 UNIT/ML
SYRINGE INTRAVENOUS
Status: COMPLETED
Start: 2019-02-20 | End: 2019-02-20

## 2019-02-20 RX ADMIN — Medication 500 UNITS: at 14:43

## 2019-02-20 RX ADMIN — Medication 10 ML: at 14:42

## 2019-02-20 RX ADMIN — CEFTRIAXONE SODIUM 2 G: 2 INJECTION, POWDER, FOR SOLUTION INTRAMUSCULAR; INTRAVENOUS at 14:38

## 2019-02-20 RX ADMIN — HEPARIN 500 UNITS: 100 SYRINGE at 14:43

## 2019-02-20 NOTE — PROGRESS NOTES
[facility] Progress Note Date: 2019 Name: [facility] MRN: [id number] : 1960 Mr. Yoana Mendoza was assessed and education was provided. Mr. Arnetta Alpers vitals were reviewed. Visit Vitals BP (!) 145/91 (BP 1 Location: Right arm, BP Patient Position: Sitting) Pulse 93 Temp 99.1 °F (37.3 °C) Resp 18 SpO2 100% Lab results were obtained and reviewed. No results found for this or any previous visit (from the past 12 hour(s)). []  Vancomycin  
  []  Invanz  
  []  Cubicin  
  [x]  Rocephin 2 grams IV push  
 was infused per orders without complications. Mr. Yoana Mendoza tolerated infusion, and had no complaints at this time. Patient armband removed and shredded. PICC line flushed and dressing changed per orders, per protocol. Pt tolerated well Mr. Yoana Mendoza was discharged from [place] [address] in stable condition at 1500. He is to return on 19 at 1300 for his next appointment. Mario Rios RN 2019 
3:01 PM

## 2019-02-21 ENCOUNTER — HOME CARE VISIT (OUTPATIENT)
Dept: HOME HEALTH SERVICES | Facility: HOME HEALTH | Age: 59
End: 2019-02-21
Payer: MEDICARE

## 2019-02-21 ENCOUNTER — HOSPITAL ENCOUNTER (INPATIENT)
Age: 59
LOS: 6 days | Discharge: NURSING, REHAB OR LONG-TERM CARE | DRG: 239 | End: 2019-02-27
Attending: EMERGENCY MEDICINE | Admitting: INTERNAL MEDICINE
Payer: MEDICARE

## 2019-02-21 ENCOUNTER — APPOINTMENT (OUTPATIENT)
Dept: GENERAL RADIOLOGY | Age: 59
DRG: 239 | End: 2019-02-21
Attending: EMERGENCY MEDICINE
Payer: MEDICARE

## 2019-02-21 ENCOUNTER — HOSPITAL ENCOUNTER (OUTPATIENT)
Dept: INFUSION THERAPY | Age: 59
Discharge: HOME | End: 2019-02-21
Payer: MEDICARE

## 2019-02-21 VITALS
HEART RATE: 81 BPM | DIASTOLIC BLOOD PRESSURE: 84 MMHG | RESPIRATION RATE: 18 BRPM | OXYGEN SATURATION: 98 % | SYSTOLIC BLOOD PRESSURE: 136 MMHG | TEMPERATURE: 97.9 F

## 2019-02-21 DIAGNOSIS — E87.1 HYPONATREMIA: ICD-10-CM

## 2019-02-21 DIAGNOSIS — I96 GANGRENE (HCC): Primary | ICD-10-CM

## 2019-02-21 DIAGNOSIS — D72.829 LEUKOCYTOSIS, UNSPECIFIED TYPE: ICD-10-CM

## 2019-02-21 DIAGNOSIS — E86.0 DEHYDRATION: ICD-10-CM

## 2019-02-21 PROBLEM — L08.9 FOOT INFECTION: Status: ACTIVE | Noted: 2019-02-21

## 2019-02-21 LAB
ALBUMIN SERPL-MCNC: 3.3 G/DL (ref 3.4–5)
ALBUMIN/GLOB SERPL: 0.7 {RATIO} (ref 0.8–1.7)
ALP SERPL-CCNC: 151 U/L (ref 45–117)
ALT SERPL-CCNC: 23 U/L (ref 16–61)
ANION GAP SERPL CALC-SCNC: 10 MMOL/L (ref 3–18)
AST SERPL-CCNC: 33 U/L (ref 15–37)
BASOPHILS # BLD: 0 K/UL (ref 0–0.1)
BASOPHILS NFR BLD: 0 % (ref 0–2)
BILIRUB SERPL-MCNC: 0.1 MG/DL (ref 0.2–1)
BUN SERPL-MCNC: 12 MG/DL (ref 7–18)
BUN/CREAT SERPL: 8 (ref 12–20)
CALCIUM SERPL-MCNC: 8.6 MG/DL (ref 8.5–10.1)
CHLORIDE SERPL-SCNC: 100 MMOL/L (ref 100–108)
CO2 SERPL-SCNC: 25 MMOL/L (ref 21–32)
CREAT SERPL-MCNC: 1.44 MG/DL (ref 0.6–1.3)
DIFFERENTIAL METHOD BLD: ABNORMAL
EOSINOPHIL # BLD: 0.7 K/UL (ref 0–0.4)
EOSINOPHIL NFR BLD: 6 % (ref 0–5)
ERYTHROCYTE [DISTWIDTH] IN BLOOD BY AUTOMATED COUNT: 12.6 % (ref 11.6–14.5)
GLOBULIN SER CALC-MCNC: 4.8 G/DL (ref 2–4)
GLUCOSE SERPL-MCNC: 104 MG/DL (ref 74–99)
HCT VFR BLD AUTO: 36.5 % (ref 36–48)
HGB BLD-MCNC: 11.3 G/DL (ref 13–16)
LYMPHOCYTES # BLD: 1.9 K/UL (ref 0.9–3.6)
LYMPHOCYTES NFR BLD: 14 % (ref 21–52)
MCH RBC QN AUTO: 27.6 PG (ref 24–34)
MCHC RBC AUTO-ENTMCNC: 31 G/DL (ref 31–37)
MCV RBC AUTO: 89.2 FL (ref 74–97)
MONOCYTES # BLD: 0.8 K/UL (ref 0.05–1.2)
MONOCYTES NFR BLD: 6 % (ref 3–10)
NEUTS SEG # BLD: 9.9 K/UL (ref 1.8–8)
NEUTS SEG NFR BLD: 74 % (ref 40–73)
PLATELET # BLD AUTO: 462 K/UL (ref 135–420)
PMV BLD AUTO: 8.8 FL (ref 9.2–11.8)
POTASSIUM SERPL-SCNC: 3.9 MMOL/L (ref 3.5–5.5)
PROT SERPL-MCNC: 8.1 G/DL (ref 6.4–8.2)
RBC # BLD AUTO: 4.09 M/UL (ref 4.7–5.5)
SODIUM SERPL-SCNC: 135 MMOL/L (ref 136–145)
WBC # BLD AUTO: 13.4 K/UL (ref 4.6–13.2)

## 2019-02-21 PROCEDURE — 3331090003 HH PPS REVENUE ADJ

## 2019-02-21 PROCEDURE — 85025 COMPLETE CBC W/AUTO DIFF WBC: CPT

## 2019-02-21 PROCEDURE — 73630 X-RAY EXAM OF FOOT: CPT

## 2019-02-21 PROCEDURE — 74011250636 HC RX REV CODE- 250/636: Performed by: NURSE PRACTITIONER

## 2019-02-21 PROCEDURE — 74011000250 HC RX REV CODE- 250: Performed by: INTERNAL MEDICINE

## 2019-02-21 PROCEDURE — 3331090001 HH PPS REVENUE CREDIT

## 2019-02-21 PROCEDURE — 96374 THER/PROPH/DIAG INJ IV PUSH: CPT

## 2019-02-21 PROCEDURE — 74011250636 HC RX REV CODE- 250/636: Performed by: INTERNAL MEDICINE

## 2019-02-21 PROCEDURE — 96361 HYDRATE IV INFUSION ADD-ON: CPT

## 2019-02-21 PROCEDURE — 74011250637 HC RX REV CODE- 250/637: Performed by: NURSE PRACTITIONER

## 2019-02-21 PROCEDURE — 99284 EMERGENCY DEPT VISIT MOD MDM: CPT

## 2019-02-21 PROCEDURE — 65270000029 HC RM PRIVATE

## 2019-02-21 PROCEDURE — 80053 COMPREHEN METABOLIC PANEL: CPT

## 2019-02-21 PROCEDURE — 3331090002 HH PPS REVENUE DEBIT

## 2019-02-21 RX ORDER — VANCOMYCIN HYDROCHLORIDE
1250 ONCE
Status: COMPLETED | OUTPATIENT
Start: 2019-02-21 | End: 2019-02-22

## 2019-02-21 RX ORDER — MORPHINE SULFATE 4 MG/ML
4 INJECTION INTRAVENOUS
Status: COMPLETED | OUTPATIENT
Start: 2019-02-21 | End: 2019-02-21

## 2019-02-21 RX ORDER — SODIUM CHLORIDE 9 MG/ML
1000 INJECTION, SOLUTION INTRAVENOUS CONTINUOUS
Status: DISCONTINUED | OUTPATIENT
Start: 2019-02-21 | End: 2019-02-22

## 2019-02-21 RX ORDER — HEPARIN 100 UNIT/ML
500 SYRINGE INTRAVENOUS ONCE
Status: COMPLETED | OUTPATIENT
Start: 2019-02-21 | End: 2019-02-21

## 2019-02-21 RX ORDER — OXYCODONE AND ACETAMINOPHEN 5; 325 MG/1; MG/1
1 TABLET ORAL
Status: COMPLETED | OUTPATIENT
Start: 2019-02-21 | End: 2019-02-21

## 2019-02-21 RX ORDER — SODIUM CHLORIDE 0.9 % (FLUSH) 0.9 %
5-10 SYRINGE (ML) INJECTION AS NEEDED
Status: DISCONTINUED | OUTPATIENT
Start: 2019-02-21 | End: 2019-02-25 | Stop reason: HOSPADM

## 2019-02-21 RX ADMIN — CEFTRIAXONE SODIUM 2 G: 2 INJECTION, POWDER, FOR SOLUTION INTRAMUSCULAR; INTRAVENOUS at 13:33

## 2019-02-21 RX ADMIN — MORPHINE SULFATE 4 MG: 4 INJECTION INTRAVENOUS at 22:51

## 2019-02-21 RX ADMIN — Medication 500 UNITS: at 13:37

## 2019-02-21 RX ADMIN — Medication 10 ML: at 13:37

## 2019-02-21 RX ADMIN — OXYCODONE AND ACETAMINOPHEN 1 TABLET: 5; 325 TABLET ORAL at 21:36

## 2019-02-21 RX ADMIN — SODIUM CHLORIDE 1000 ML: 900 INJECTION, SOLUTION INTRAVENOUS at 22:29

## 2019-02-21 NOTE — PROGRESS NOTES
[facility] Progress [facility] Date: 2019 Name: [facility] MRN: [id number] : 1960 IV Antibiotics Mr. Quynh Granado was assessed and education was provided. Mr. Manish Hernandez vitals were reviewed. Visit Vitals /84 (BP 1 Location: Right arm, BP Patient Position: Sitting) Pulse 81 Temp 97.9 °F (36.6 °C) Resp 18 SpO2 98% Lab results were obtained and reviewed. No results found for this or any previous visit (from the past 12 hour(s)). []  Vancomycin  
  []  Invanz  
  []  Cubicin  
  [x]  Rocephin 2 grams IV push  
 was infused per orders without complications. Mr. Quynh Granado tolerated infusion, and had no complaints at this time. Patient armband removed and shredded. Mr. Quynh Granado was discharged from [place] [address] in stable condition at 1340. He is to return on 19 at 1330 for his next appointment. Armband removed and shredded. Sarah Young RN 2019 
1:42 PM

## 2019-02-22 ENCOUNTER — HOME CARE VISIT (OUTPATIENT)
Dept: SCHEDULING | Facility: HOME HEALTH | Age: 59
End: 2019-02-22
Payer: MEDICARE

## 2019-02-22 ENCOUNTER — ANESTHESIA (OUTPATIENT)
Dept: SURGERY | Age: 59
DRG: 239 | End: 2019-02-22
Payer: MEDICARE

## 2019-02-22 ENCOUNTER — ANESTHESIA EVENT (OUTPATIENT)
Dept: SURGERY | Age: 59
DRG: 239 | End: 2019-02-22
Payer: MEDICARE

## 2019-02-22 ENCOUNTER — HOSPITAL ENCOUNTER (OUTPATIENT)
Dept: INFUSION THERAPY | Age: 59
Discharge: HOME | End: 2019-02-22
Payer: MEDICARE

## 2019-02-22 ENCOUNTER — APPOINTMENT (OUTPATIENT)
Dept: GENERAL RADIOLOGY | Age: 59
DRG: 239 | End: 2019-02-22
Attending: INTERNAL MEDICINE
Payer: MEDICARE

## 2019-02-22 PROBLEM — L03.90 CELLULITIS: Status: ACTIVE | Noted: 2019-02-22

## 2019-02-22 PROBLEM — I96 GANGRENE (HCC): Status: ACTIVE | Noted: 2019-02-22

## 2019-02-22 LAB
AMPHET UR QL SCN: NEGATIVE
APPEARANCE UR: CLEAR
BARBITURATES UR QL SCN: NEGATIVE
BENZODIAZ UR QL: NEGATIVE
BILIRUB UR QL: NEGATIVE
CANNABINOIDS UR QL SCN: NEGATIVE
COCAINE UR QL SCN: NEGATIVE
COLOR UR: NORMAL
GLUCOSE UR STRIP.AUTO-MCNC: NEGATIVE MG/DL
HDSCOM,HDSCOM: ABNORMAL
HGB UR QL STRIP: NEGATIVE
KETONES UR QL STRIP.AUTO: NEGATIVE MG/DL
LEUKOCYTE ESTERASE UR QL STRIP.AUTO: NEGATIVE
METHADONE UR QL: NEGATIVE
NITRITE UR QL STRIP.AUTO: NEGATIVE
OPIATES UR QL: POSITIVE
PCP UR QL: NEGATIVE
PH UR STRIP: 6 [PH] (ref 5–8)
PROT UR STRIP-MCNC: NEGATIVE MG/DL
SP GR UR REFRACTOMETRY: 1.01 (ref 1–1.03)
UROBILINOGEN UR QL STRIP.AUTO: 0.2 EU/DL (ref 0.2–1)

## 2019-02-22 PROCEDURE — 71045 X-RAY EXAM CHEST 1 VIEW: CPT

## 2019-02-22 PROCEDURE — 77030020255 HC SOL INJ LR 1000ML BG: Performed by: PODIATRIST

## 2019-02-22 PROCEDURE — 74011250636 HC RX REV CODE- 250/636

## 2019-02-22 PROCEDURE — 74011250636 HC RX REV CODE- 250/636: Performed by: ANESTHESIOLOGY

## 2019-02-22 PROCEDURE — 76060000032 HC ANESTHESIA 0.5 TO 1 HR: Performed by: PODIATRIST

## 2019-02-22 PROCEDURE — 94640 AIRWAY INHALATION TREATMENT: CPT

## 2019-02-22 PROCEDURE — 0Y6N0ZD DETACHMENT AT LEFT FOOT, PARTIAL 4TH RAY, OPEN APPROACH: ICD-10-PCS | Performed by: PODIATRIST

## 2019-02-22 PROCEDURE — 77030031139 HC SUT VCRL2 J&J -A: Performed by: PODIATRIST

## 2019-02-22 PROCEDURE — 74011000250 HC RX REV CODE- 250

## 2019-02-22 PROCEDURE — 76210000017 HC OR PH I REC 1.5 TO 2 HR: Performed by: PODIATRIST

## 2019-02-22 PROCEDURE — 74011250636 HC RX REV CODE- 250/636: Performed by: INTERNAL MEDICINE

## 2019-02-22 PROCEDURE — 74011250636 HC RX REV CODE- 250/636: Performed by: HOSPITALIST

## 2019-02-22 PROCEDURE — 77030012508 HC MSK AIRWY LMA AMBU -A: Performed by: ANESTHESIOLOGY

## 2019-02-22 PROCEDURE — 0Y6N0ZB DETACHMENT AT LEFT FOOT, PARTIAL 2ND RAY, OPEN APPROACH: ICD-10-PCS | Performed by: PODIATRIST

## 2019-02-22 PROCEDURE — 77030032490 HC SLV COMPR SCD KNE COVD -B: Performed by: PODIATRIST

## 2019-02-22 PROCEDURE — 0Y6N0ZF DETACHMENT AT LEFT FOOT, PARTIAL 5TH RAY, OPEN APPROACH: ICD-10-PCS | Performed by: PODIATRIST

## 2019-02-22 PROCEDURE — 77030031140 HC SUT VCRL3 J&J -A: Performed by: PODIATRIST

## 2019-02-22 PROCEDURE — 0Y6N0ZC DETACHMENT AT LEFT FOOT, PARTIAL 3RD RAY, OPEN APPROACH: ICD-10-PCS | Performed by: PODIATRIST

## 2019-02-22 PROCEDURE — 94760 N-INVAS EAR/PLS OXIMETRY 1: CPT

## 2019-02-22 PROCEDURE — 88305 TISSUE EXAM BY PATHOLOGIST: CPT

## 2019-02-22 PROCEDURE — 77030006788 HC BLD SAW OSC STRY -B: Performed by: PODIATRIST

## 2019-02-22 PROCEDURE — 88311 DECALCIFY TISSUE: CPT

## 2019-02-22 PROCEDURE — 74011000250 HC RX REV CODE- 250: Performed by: INTERNAL MEDICINE

## 2019-02-22 PROCEDURE — 74011250637 HC RX REV CODE- 250/637: Performed by: INTERNAL MEDICINE

## 2019-02-22 PROCEDURE — 81003 URINALYSIS AUTO W/O SCOPE: CPT

## 2019-02-22 PROCEDURE — 77030018836 HC SOL IRR NACL ICUM -A: Performed by: PODIATRIST

## 2019-02-22 PROCEDURE — 77030013708 HC HNDPC SUC IRR PULS STRY –B: Performed by: PODIATRIST

## 2019-02-22 PROCEDURE — 74011250637 HC RX REV CODE- 250/637: Performed by: ANESTHESIOLOGY

## 2019-02-22 PROCEDURE — 93005 ELECTROCARDIOGRAM TRACING: CPT

## 2019-02-22 PROCEDURE — 74011250637 HC RX REV CODE- 250/637: Performed by: HOSPITALIST

## 2019-02-22 PROCEDURE — 88307 TISSUE EXAM BY PATHOLOGIST: CPT

## 2019-02-22 PROCEDURE — 77030006822 HC BLD SAW SAG BRSM -B: Performed by: PODIATRIST

## 2019-02-22 PROCEDURE — 0HXNXZZ TRANSFER LEFT FOOT SKIN, EXTERNAL APPROACH: ICD-10-PCS | Performed by: PODIATRIST

## 2019-02-22 PROCEDURE — 74011250636 HC RX REV CODE- 250/636: Performed by: PODIATRIST

## 2019-02-22 PROCEDURE — 74011000250 HC RX REV CODE- 250: Performed by: PODIATRIST

## 2019-02-22 PROCEDURE — 0Y6N0Z9 DETACHMENT AT LEFT FOOT, PARTIAL 1ST RAY, OPEN APPROACH: ICD-10-PCS | Performed by: PODIATRIST

## 2019-02-22 PROCEDURE — 77030002986 HC SUT PROL J&J -A: Performed by: PODIATRIST

## 2019-02-22 PROCEDURE — 77030013140 HC MSK NEB VYRM -A

## 2019-02-22 PROCEDURE — 3331090001 HH PPS REVENUE CREDIT

## 2019-02-22 PROCEDURE — 80307 DRUG TEST PRSMV CHEM ANLYZR: CPT

## 2019-02-22 PROCEDURE — 74011000250 HC RX REV CODE- 250: Performed by: HOSPITALIST

## 2019-02-22 PROCEDURE — 76010000138 HC OR TIME 0.5 TO 1 HR: Performed by: PODIATRIST

## 2019-02-22 PROCEDURE — 3331090002 HH PPS REVENUE DEBIT

## 2019-02-22 PROCEDURE — 65270000029 HC RM PRIVATE

## 2019-02-22 PROCEDURE — 74011250636 HC RX REV CODE- 250/636: Performed by: NURSE PRACTITIONER

## 2019-02-22 RX ORDER — LORAZEPAM 2 MG/ML
2 INJECTION INTRAMUSCULAR
Status: DISCONTINUED | OUTPATIENT
Start: 2019-02-22 | End: 2019-02-27 | Stop reason: HOSPADM

## 2019-02-22 RX ORDER — OXYCODONE AND ACETAMINOPHEN 5; 325 MG/1; MG/1
1 TABLET ORAL AS NEEDED
Status: DISCONTINUED | OUTPATIENT
Start: 2019-02-22 | End: 2019-02-22 | Stop reason: HOSPADM

## 2019-02-22 RX ORDER — FENTANYL CITRATE 50 UG/ML
INJECTION, SOLUTION INTRAMUSCULAR; INTRAVENOUS AS NEEDED
Status: DISCONTINUED | OUTPATIENT
Start: 2019-02-22 | End: 2019-02-22 | Stop reason: HOSPADM

## 2019-02-22 RX ORDER — LORAZEPAM 2 MG/ML
1 INJECTION INTRAMUSCULAR
Status: DISCONTINUED | OUTPATIENT
Start: 2019-02-22 | End: 2019-02-27 | Stop reason: HOSPADM

## 2019-02-22 RX ORDER — MAGNESIUM SULFATE 100 %
4 CRYSTALS MISCELLANEOUS AS NEEDED
Status: DISCONTINUED | OUTPATIENT
Start: 2019-02-22 | End: 2019-02-22 | Stop reason: HOSPADM

## 2019-02-22 RX ORDER — HYDROCODONE BITARTRATE AND ACETAMINOPHEN 5; 325 MG/1; MG/1
1 TABLET ORAL
Status: DISCONTINUED | OUTPATIENT
Start: 2019-02-22 | End: 2019-02-22

## 2019-02-22 RX ORDER — IBUPROFEN 200 MG
1 TABLET ORAL DAILY
Status: DISCONTINUED | OUTPATIENT
Start: 2019-02-22 | End: 2019-02-27 | Stop reason: HOSPADM

## 2019-02-22 RX ORDER — SODIUM CHLORIDE, SODIUM LACTATE, POTASSIUM CHLORIDE, CALCIUM CHLORIDE 600; 310; 30; 20 MG/100ML; MG/100ML; MG/100ML; MG/100ML
125 INJECTION, SOLUTION INTRAVENOUS CONTINUOUS
Status: DISCONTINUED | OUTPATIENT
Start: 2019-02-22 | End: 2019-02-25

## 2019-02-22 RX ORDER — FENTANYL CITRATE 50 UG/ML
25 INJECTION, SOLUTION INTRAMUSCULAR; INTRAVENOUS
Status: COMPLETED | OUTPATIENT
Start: 2019-02-22 | End: 2019-02-22

## 2019-02-22 RX ORDER — HYDROMORPHONE HYDROCHLORIDE 2 MG/ML
INJECTION, SOLUTION INTRAMUSCULAR; INTRAVENOUS; SUBCUTANEOUS AS NEEDED
Status: DISCONTINUED | OUTPATIENT
Start: 2019-02-22 | End: 2019-02-22 | Stop reason: HOSPADM

## 2019-02-22 RX ORDER — MORPHINE SULFATE 2 MG/ML
1 INJECTION, SOLUTION INTRAMUSCULAR; INTRAVENOUS
Status: DISCONTINUED | OUTPATIENT
Start: 2019-02-22 | End: 2019-02-23

## 2019-02-22 RX ORDER — HEPARIN SODIUM 5000 [USP'U]/ML
5000 INJECTION, SOLUTION INTRAVENOUS; SUBCUTANEOUS EVERY 8 HOURS
Status: DISCONTINUED | OUTPATIENT
Start: 2019-02-22 | End: 2019-02-27 | Stop reason: HOSPADM

## 2019-02-22 RX ORDER — FOLIC ACID 1 MG/1
1 TABLET ORAL DAILY
Status: DISCONTINUED | OUTPATIENT
Start: 2019-02-22 | End: 2019-02-27 | Stop reason: HOSPADM

## 2019-02-22 RX ORDER — NALOXONE HYDROCHLORIDE 0.4 MG/ML
0.1 INJECTION, SOLUTION INTRAMUSCULAR; INTRAVENOUS; SUBCUTANEOUS
Status: DISCONTINUED | OUTPATIENT
Start: 2019-02-22 | End: 2019-02-22 | Stop reason: HOSPADM

## 2019-02-22 RX ORDER — FAMOTIDINE 20 MG/1
20 TABLET, FILM COATED ORAL DAILY
Status: DISCONTINUED | OUTPATIENT
Start: 2019-02-22 | End: 2019-02-27 | Stop reason: HOSPADM

## 2019-02-22 RX ORDER — SODIUM CHLORIDE 9 MG/ML
100 INJECTION, SOLUTION INTRAVENOUS CONTINUOUS
Status: DISCONTINUED | OUTPATIENT
Start: 2019-02-22 | End: 2019-02-25

## 2019-02-22 RX ORDER — BUPIVACAINE HYDROCHLORIDE 5 MG/ML
INJECTION, SOLUTION EPIDURAL; INTRACAUDAL AS NEEDED
Status: DISCONTINUED | OUTPATIENT
Start: 2019-02-22 | End: 2019-02-22 | Stop reason: HOSPADM

## 2019-02-22 RX ORDER — HYDROXYZINE 25 MG/1
25 TABLET, FILM COATED ORAL
Status: DISCONTINUED | OUTPATIENT
Start: 2019-02-22 | End: 2019-02-27 | Stop reason: HOSPADM

## 2019-02-22 RX ORDER — AMLODIPINE BESYLATE 5 MG/1
10 TABLET ORAL DAILY
Status: DISCONTINUED | OUTPATIENT
Start: 2019-02-22 | End: 2019-02-27 | Stop reason: HOSPADM

## 2019-02-22 RX ORDER — LISINOPRIL 20 MG/1
40 TABLET ORAL DAILY
Status: DISCONTINUED | OUTPATIENT
Start: 2019-02-22 | End: 2019-02-27 | Stop reason: HOSPADM

## 2019-02-22 RX ORDER — IPRATROPIUM BROMIDE AND ALBUTEROL SULFATE 2.5; .5 MG/3ML; MG/3ML
3 SOLUTION RESPIRATORY (INHALATION)
Status: DISCONTINUED | OUTPATIENT
Start: 2019-02-22 | End: 2019-02-27 | Stop reason: HOSPADM

## 2019-02-22 RX ORDER — HYDROMORPHONE HYDROCHLORIDE 2 MG/ML
0.5 INJECTION, SOLUTION INTRAMUSCULAR; INTRAVENOUS; SUBCUTANEOUS
Status: COMPLETED | OUTPATIENT
Start: 2019-02-22 | End: 2019-02-22

## 2019-02-22 RX ORDER — CLONIDINE HYDROCHLORIDE 0.1 MG/1
0.1 TABLET ORAL 2 TIMES DAILY
Status: DISCONTINUED | OUTPATIENT
Start: 2019-02-22 | End: 2019-02-24

## 2019-02-22 RX ORDER — LORAZEPAM 1 MG/1
2 TABLET ORAL
Status: DISCONTINUED | OUTPATIENT
Start: 2019-02-22 | End: 2019-02-27 | Stop reason: HOSPADM

## 2019-02-22 RX ORDER — PROPOFOL 10 MG/ML
INJECTION, EMULSION INTRAVENOUS AS NEEDED
Status: DISCONTINUED | OUTPATIENT
Start: 2019-02-22 | End: 2019-02-22 | Stop reason: HOSPADM

## 2019-02-22 RX ORDER — SODIUM CHLORIDE 0.9 % (FLUSH) 0.9 %
5-40 SYRINGE (ML) INJECTION AS NEEDED
Status: DISCONTINUED | OUTPATIENT
Start: 2019-02-22 | End: 2019-02-27 | Stop reason: HOSPADM

## 2019-02-22 RX ORDER — ASPIRIN 325 MG/1
100 TABLET, FILM COATED ORAL DAILY
Status: DISCONTINUED | OUTPATIENT
Start: 2019-02-22 | End: 2019-02-27 | Stop reason: HOSPADM

## 2019-02-22 RX ORDER — LORAZEPAM 1 MG/1
1 TABLET ORAL
Status: DISCONTINUED | OUTPATIENT
Start: 2019-02-22 | End: 2019-02-27 | Stop reason: HOSPADM

## 2019-02-22 RX ORDER — MIDAZOLAM HYDROCHLORIDE 1 MG/ML
INJECTION, SOLUTION INTRAMUSCULAR; INTRAVENOUS AS NEEDED
Status: DISCONTINUED | OUTPATIENT
Start: 2019-02-22 | End: 2019-02-22 | Stop reason: HOSPADM

## 2019-02-22 RX ORDER — SODIUM CHLORIDE, SODIUM LACTATE, POTASSIUM CHLORIDE, CALCIUM CHLORIDE 600; 310; 30; 20 MG/100ML; MG/100ML; MG/100ML; MG/100ML
50 INJECTION, SOLUTION INTRAVENOUS CONTINUOUS
Status: DISCONTINUED | OUTPATIENT
Start: 2019-02-22 | End: 2019-02-22 | Stop reason: HOSPADM

## 2019-02-22 RX ORDER — OXYCODONE AND ACETAMINOPHEN 7.5; 325 MG/1; MG/1
1 TABLET ORAL
Status: DISCONTINUED | OUTPATIENT
Start: 2019-02-22 | End: 2019-02-23

## 2019-02-22 RX ORDER — IPRATROPIUM BROMIDE AND ALBUTEROL SULFATE 2.5; .5 MG/3ML; MG/3ML
3 SOLUTION RESPIRATORY (INHALATION)
Status: DISCONTINUED | OUTPATIENT
Start: 2019-02-22 | End: 2019-02-22

## 2019-02-22 RX ORDER — ONDANSETRON 2 MG/ML
INJECTION INTRAMUSCULAR; INTRAVENOUS AS NEEDED
Status: DISCONTINUED | OUTPATIENT
Start: 2019-02-22 | End: 2019-02-22 | Stop reason: HOSPADM

## 2019-02-22 RX ORDER — KETOROLAC TROMETHAMINE 30 MG/ML
INJECTION, SOLUTION INTRAMUSCULAR; INTRAVENOUS AS NEEDED
Status: DISCONTINUED | OUTPATIENT
Start: 2019-02-22 | End: 2019-02-22 | Stop reason: HOSPADM

## 2019-02-22 RX ORDER — SODIUM CHLORIDE 0.9 % (FLUSH) 0.9 %
5-40 SYRINGE (ML) INJECTION EVERY 8 HOURS
Status: DISCONTINUED | OUTPATIENT
Start: 2019-02-22 | End: 2019-02-27 | Stop reason: HOSPADM

## 2019-02-22 RX ORDER — HYDROMORPHONE HYDROCHLORIDE 2 MG/ML
INJECTION, SOLUTION INTRAMUSCULAR; INTRAVENOUS; SUBCUTANEOUS
Status: COMPLETED
Start: 2019-02-22 | End: 2019-02-22

## 2019-02-22 RX ORDER — LORAZEPAM 2 MG/ML
3 INJECTION INTRAMUSCULAR
Status: DISCONTINUED | OUTPATIENT
Start: 2019-02-22 | End: 2019-02-27 | Stop reason: HOSPADM

## 2019-02-22 RX ORDER — ONDANSETRON 2 MG/ML
4 INJECTION INTRAMUSCULAR; INTRAVENOUS ONCE
Status: DISCONTINUED | OUTPATIENT
Start: 2019-02-22 | End: 2019-02-22 | Stop reason: HOSPADM

## 2019-02-22 RX ORDER — SODIUM CHLORIDE, SODIUM LACTATE, POTASSIUM CHLORIDE, CALCIUM CHLORIDE 600; 310; 30; 20 MG/100ML; MG/100ML; MG/100ML; MG/100ML
INJECTION, SOLUTION INTRAVENOUS
Status: DISCONTINUED | OUTPATIENT
Start: 2019-02-22 | End: 2019-02-22 | Stop reason: HOSPADM

## 2019-02-22 RX ORDER — INSULIN LISPRO 100 [IU]/ML
INJECTION, SOLUTION INTRAVENOUS; SUBCUTANEOUS ONCE
Status: DISCONTINUED | OUTPATIENT
Start: 2019-02-22 | End: 2019-02-22 | Stop reason: HOSPADM

## 2019-02-22 RX ORDER — LIDOCAINE HYDROCHLORIDE 20 MG/ML
INJECTION, SOLUTION EPIDURAL; INFILTRATION; INTRACAUDAL; PERINEURAL AS NEEDED
Status: DISCONTINUED | OUTPATIENT
Start: 2019-02-22 | End: 2019-02-22 | Stop reason: HOSPADM

## 2019-02-22 RX ORDER — ATORVASTATIN CALCIUM 20 MG/1
20 TABLET, FILM COATED ORAL DAILY
Status: DISCONTINUED | OUTPATIENT
Start: 2019-02-22 | End: 2019-02-27 | Stop reason: HOSPADM

## 2019-02-22 RX ORDER — GLYCOPYRROLATE 0.2 MG/ML
INJECTION INTRAMUSCULAR; INTRAVENOUS AS NEEDED
Status: DISCONTINUED | OUTPATIENT
Start: 2019-02-22 | End: 2019-02-22 | Stop reason: HOSPADM

## 2019-02-22 RX ORDER — DEXTROSE 50 % IN WATER (D50W) INTRAVENOUS SYRINGE
25-50 AS NEEDED
Status: DISCONTINUED | OUTPATIENT
Start: 2019-02-22 | End: 2019-02-22 | Stop reason: HOSPADM

## 2019-02-22 RX ORDER — COLCHICINE 0.6 MG/1
0.6 TABLET ORAL DAILY
Status: DISCONTINUED | OUTPATIENT
Start: 2019-02-22 | End: 2019-02-27 | Stop reason: HOSPADM

## 2019-02-22 RX ADMIN — Medication 10 ML: at 23:00

## 2019-02-22 RX ADMIN — ALTEPLASE 2 MG: 2.2 INJECTION, POWDER, LYOPHILIZED, FOR SOLUTION INTRAVENOUS at 19:05

## 2019-02-22 RX ADMIN — KETOROLAC TROMETHAMINE 30 MG: 30 INJECTION, SOLUTION INTRAMUSCULAR; INTRAVENOUS at 16:38

## 2019-02-22 RX ADMIN — ATORVASTATIN CALCIUM 20 MG: 20 TABLET, FILM COATED ORAL at 08:18

## 2019-02-22 RX ADMIN — MIDAZOLAM HYDROCHLORIDE 1 MG: 1 INJECTION, SOLUTION INTRAMUSCULAR; INTRAVENOUS at 16:10

## 2019-02-22 RX ADMIN — PROPOFOL 50 MG: 10 INJECTION, EMULSION INTRAVENOUS at 16:12

## 2019-02-22 RX ADMIN — FENTANYL CITRATE 50 MCG: 50 INJECTION, SOLUTION INTRAMUSCULAR; INTRAVENOUS at 16:20

## 2019-02-22 RX ADMIN — VANCOMYCIN HYDROCHLORIDE 750 MG: 10 INJECTION, POWDER, LYOPHILIZED, FOR SOLUTION INTRAVENOUS at 23:00

## 2019-02-22 RX ADMIN — HYDROMORPHONE HYDROCHLORIDE 0.5 MG: 2 INJECTION INTRAMUSCULAR; INTRAVENOUS; SUBCUTANEOUS at 17:00

## 2019-02-22 RX ADMIN — HYDROCODONE BITARTRATE AND ACETAMINOPHEN 1 TABLET: 5; 325 TABLET ORAL at 08:24

## 2019-02-22 RX ADMIN — HYDROMORPHONE HYDROCHLORIDE 0.5 MG: 2 INJECTION, SOLUTION INTRAMUSCULAR; INTRAVENOUS; SUBCUTANEOUS at 16:23

## 2019-02-22 RX ADMIN — PROPOFOL 150 MG: 10 INJECTION, EMULSION INTRAVENOUS at 16:09

## 2019-02-22 RX ADMIN — FAMOTIDINE 20 MG: 20 TABLET ORAL at 08:18

## 2019-02-22 RX ADMIN — HYDROMORPHONE HYDROCHLORIDE 0.5 MG: 2 INJECTION, SOLUTION INTRAMUSCULAR; INTRAVENOUS; SUBCUTANEOUS at 17:15

## 2019-02-22 RX ADMIN — HYDROMORPHONE HYDROCHLORIDE 0.5 MG: 2 INJECTION, SOLUTION INTRAMUSCULAR; INTRAVENOUS; SUBCUTANEOUS at 16:30

## 2019-02-22 RX ADMIN — MORPHINE SULFATE 1 MG: 2 INJECTION, SOLUTION INTRAMUSCULAR; INTRAVENOUS at 21:37

## 2019-02-22 RX ADMIN — HEPARIN SODIUM 5000 UNITS: 5000 INJECTION INTRAVENOUS; SUBCUTANEOUS at 19:05

## 2019-02-22 RX ADMIN — LISINOPRIL 40 MG: 20 TABLET ORAL at 08:18

## 2019-02-22 RX ADMIN — SODIUM CHLORIDE, SODIUM LACTATE, POTASSIUM CHLORIDE, AND CALCIUM CHLORIDE 125 ML/HR: 600; 310; 30; 20 INJECTION, SOLUTION INTRAVENOUS at 15:45

## 2019-02-22 RX ADMIN — VANCOMYCIN HYDROCHLORIDE 1250 MG: 10 INJECTION, POWDER, LYOPHILIZED, FOR SOLUTION INTRAVENOUS at 00:30

## 2019-02-22 RX ADMIN — PANCRELIPASE LIPASE, PANCRELIPASE PROTEASE, PANCRELIPASE AMYLASE 1 CAPSULE: 10000; 32000; 42000 CAPSULE, DELAYED RELEASE ORAL at 19:14

## 2019-02-22 RX ADMIN — HYDROMORPHONE HYDROCHLORIDE 0.5 MG: 2 INJECTION, SOLUTION INTRAMUSCULAR; INTRAVENOUS; SUBCUTANEOUS at 17:00

## 2019-02-22 RX ADMIN — MORPHINE SULFATE 1 MG: 2 INJECTION, SOLUTION INTRAMUSCULAR; INTRAVENOUS at 10:07

## 2019-02-22 RX ADMIN — MIDAZOLAM HYDROCHLORIDE 1 MG: 1 INJECTION, SOLUTION INTRAMUSCULAR; INTRAVENOUS at 16:02

## 2019-02-22 RX ADMIN — GLYCOPYRROLATE 0.2 MG: 0.2 INJECTION INTRAMUSCULAR; INTRAVENOUS at 16:08

## 2019-02-22 RX ADMIN — FENTANYL CITRATE 50 MCG: 50 INJECTION, SOLUTION INTRAMUSCULAR; INTRAVENOUS at 16:13

## 2019-02-22 RX ADMIN — HYDROMORPHONE HYDROCHLORIDE 0.5 MG: 2 INJECTION, SOLUTION INTRAMUSCULAR; INTRAVENOUS; SUBCUTANEOUS at 17:26

## 2019-02-22 RX ADMIN — FENTANYL CITRATE 25 MCG: 50 INJECTION, SOLUTION INTRAMUSCULAR; INTRAVENOUS at 18:24

## 2019-02-22 RX ADMIN — HEPARIN SODIUM 5000 UNITS: 5000 INJECTION INTRAVENOUS; SUBCUTANEOUS at 02:35

## 2019-02-22 RX ADMIN — FENTANYL CITRATE 25 MCG: 50 INJECTION, SOLUTION INTRAMUSCULAR; INTRAVENOUS at 18:05

## 2019-02-22 RX ADMIN — PROPOFOL 50 MG: 10 INJECTION, EMULSION INTRAVENOUS at 16:20

## 2019-02-22 RX ADMIN — Medication 100 MG: at 19:14

## 2019-02-22 RX ADMIN — FENTANYL CITRATE 25 MCG: 50 INJECTION, SOLUTION INTRAMUSCULAR; INTRAVENOUS at 17:56

## 2019-02-22 RX ADMIN — CLONIDINE HYDROCHLORIDE 0.1 MG: 0.1 TABLET ORAL at 21:32

## 2019-02-22 RX ADMIN — IPRATROPIUM BROMIDE AND ALBUTEROL SULFATE 3 ML: .5; 3 SOLUTION RESPIRATORY (INHALATION) at 02:29

## 2019-02-22 RX ADMIN — OXYCODONE HYDROCHLORIDE AND ACETAMINOPHEN 1 TABLET: 7.5; 325 TABLET ORAL at 02:33

## 2019-02-22 RX ADMIN — HYDROMORPHONE HYDROCHLORIDE 0.5 MG: 2 INJECTION, SOLUTION INTRAMUSCULAR; INTRAVENOUS; SUBCUTANEOUS at 17:37

## 2019-02-22 RX ADMIN — AMLODIPINE BESYLATE 10 MG: 5 TABLET ORAL at 08:18

## 2019-02-22 RX ADMIN — PANCRELIPASE LIPASE, PANCRELIPASE PROTEASE, PANCRELIPASE AMYLASE 1 CAPSULE: 10000; 32000; 42000 CAPSULE, DELAYED RELEASE ORAL at 10:07

## 2019-02-22 RX ADMIN — ONDANSETRON 4 MG: 2 INJECTION INTRAMUSCULAR; INTRAVENOUS at 16:16

## 2019-02-22 RX ADMIN — COLCHICINE 0.6 MG: 0.6 TABLET, FILM COATED ORAL at 08:18

## 2019-02-22 RX ADMIN — HYDROXYZINE HYDROCHLORIDE 25 MG: 25 TABLET, FILM COATED ORAL at 10:08

## 2019-02-22 RX ADMIN — SODIUM CHLORIDE, SODIUM LACTATE, POTASSIUM CHLORIDE, CALCIUM CHLORIDE: 600; 310; 30; 20 INJECTION, SOLUTION INTRAVENOUS at 16:03

## 2019-02-22 RX ADMIN — FOLIC ACID 1 MG: 1 TABLET ORAL at 08:18

## 2019-02-22 RX ADMIN — SODIUM CHLORIDE 100 ML/HR: 900 INJECTION, SOLUTION INTRAVENOUS at 12:47

## 2019-02-22 RX ADMIN — OXYCODONE HYDROCHLORIDE AND ACETAMINOPHEN 1 TABLET: 5; 325 TABLET ORAL at 18:34

## 2019-02-22 RX ADMIN — IPRATROPIUM BROMIDE AND ALBUTEROL SULFATE 3 ML: .5; 3 SOLUTION RESPIRATORY (INHALATION) at 07:24

## 2019-02-22 RX ADMIN — SODIUM CHLORIDE 2 G: 9 INJECTION INTRAMUSCULAR; INTRAVENOUS; SUBCUTANEOUS at 12:46

## 2019-02-22 RX ADMIN — FENTANYL CITRATE 25 MCG: 50 INJECTION, SOLUTION INTRAMUSCULAR; INTRAVENOUS at 17:46

## 2019-02-22 RX ADMIN — CLONIDINE HYDROCHLORIDE 0.1 MG: 0.1 TABLET ORAL at 08:18

## 2019-02-22 RX ADMIN — Medication 10 ML: at 13:36

## 2019-02-22 RX ADMIN — MORPHINE SULFATE 1 MG: 2 INJECTION, SOLUTION INTRAMUSCULAR; INTRAVENOUS at 13:34

## 2019-02-22 RX ADMIN — PANCRELIPASE LIPASE, PANCRELIPASE PROTEASE, PANCRELIPASE AMYLASE 1 CAPSULE: 10000; 32000; 42000 CAPSULE, DELAYED RELEASE ORAL at 23:00

## 2019-02-22 RX ADMIN — HYDROXYZINE HYDROCHLORIDE 25 MG: 25 TABLET, FILM COATED ORAL at 02:33

## 2019-02-22 RX ADMIN — LIDOCAINE HYDROCHLORIDE 60 MG: 20 INJECTION, SOLUTION EPIDURAL; INFILTRATION; INTRACAUDAL; PERINEURAL at 16:08

## 2019-02-22 NOTE — PROGRESS NOTES
Hospitalist Progress Note Patient: Wei Powell MRN: [id number]  CSN: [id number] YOB: 1960  Age: 62 y.o. Sex: male DOA: 2/21/2019 LOS:  LOS: 1 day Chief Complaint: 
 
Foot infection Assessment/Plan Recurrent foot infection (left MSSA OM great toe currently receiving IV rocephin until 3/20) Came back in with worsening infection, gangrene and necrosis of big toe, and amputation probable per podiatry Family at bedside 
 
-Gangrene of foot, previous surgery line and sutures seen, tried to lightly palpate for a pulse on foot and he started writhing in pain, so I called nurse to administer something for pain 
-etoh use-does not answer questions on whether daily intake 
-Chronic pancreatitis 
-COPD-nebs PRN, CXR negative 
-Tobacco use-heavy tobacco odor-add nicoderm patch-counselled he has to quit completely or risks further limb loss and death 
-PVD s/p stent/bypass-s/p L popliteal stenting and AT angioplasty by Dr. Trevor Jewell earlier this month-?asa daily-do not see it on list 
-HTN uncontrolled-continue current meds 
-renal insuff-IVF hydration, recheck BMP in am 
 
Continue IV abx as ordered Surgery per podiatry Tried to discuss with family but detecting reluctance to discuss plan and treatments needed IVF  cc/hr, he is NPO for now DVT proph-heparin Disposition : 
Patient Active Problem List  
Diagnosis Code  Pancreatitis K85.90  
 Dehydration E86.0  Abdominal pain R10.9  Gout attack M10.9  Cellulitis of left foot L03. [address] [facility] Hypertension I10  Smoker F17.200  Pancreatitis chronic  Gout M10.9  PAD (peripheral artery disease) ([facility]) I73.9  Alcohol use Z78.9  Hyponatremia E87.1  Cocaine abuse ([facility] F14.10  Alcohol withdrawal ([facility]) F10.239  Mal[facility] E46  
 Encephalopathy acute G93.40  Foot infection L08.9  Gang[facility] ([facility] I96  
 Cellulitis L03.90 Subjective: Has not heard his surgery plans yet Has pain in foot No fevers, chills States he does smoke but has cut \"way down\" Review of systems: 
 
Constitutional: denies myalgias Respiratory: denies SOB Cardiovascular: denies chest pain, palpitations Gastrointestinal: denies nausea, vomiting, diarrhea 
picc in arm w/o swelling or pain Vital signs/Intake and Output: 
Visit Vitals BP (!) 185/98 Pulse (!) 106 Temp 98 °F (36.7 °C) Resp 18 Ht 5' 4\" (1.626 m) Wt 49 kg (108 lb) SpO2 98% BMI 18.54 kg/m² Current Shift:  No intake/output data recorded. Last three shifts:  02/20 1901 - 02/22 0700 In: -  
Out: 400 [Urine:400] Exam: 
 
General: small elderly appearing BM, alert, NAD, OX3 Head/Neck: NCAT, supple, No masses, No lymphadenopathy CVS:Regular rate and rhythm, no M/R/G, S1/S2 heard, no thrill Lungs:Clear to auscultation bilaterally, no wheezes, rhonchi, or rales Abdomen: Soft, Nontender, No distention, Normal Bowel sounds, No hepatomegaly Extremities: left foot with gangrenous black first toe, and extension of gangrene to top of foot-foot is warm, no bounding pulse, no signs for redness or streaking, light touch and he would not allow further exam 
Neuro:grossly normal , follows commands Psych:appropriate Labs: Results:  
   
Chemistry Recent Labs  
  02/21/19 
2200 * * K 3.9  CO2 25 BUN 12  
CREA 1.44* CA 8.6 AGAP 10 BUCR 8* * TP 8.1 ALB 3.3*  
GLOB 4.8* AGRAT 0.7* CBC w/Diff Recent Labs  
  02/21/19 
2200 WBC 13.4*  
RBC 4.09* HGB 11.3* HCT 36.5 * GRANS 74* LYMPH 14* EOS 6* Cardiac Enzymes No results for input(s): CPK, CKND1, DOUGLAS in the last 72 hours. No lab exists for component: Celi Meza Coagulation No results for input(s): PTP, INR, APTT in the last 72 hours. No lab exists for component: INREXT Lipid Panel Lab Results Component Value Date/Time Cholesterol, total 126 02/24/2014 09:52 AM  
 HDL Cholesterol 58 02/24/2014 09:52 AM  
 LDL, calculated 60.6 02/24/2014 09:52 AM  
 VLDL, calculated 7.4 02/24/2014 09:52 AM  
 Triglyceride 37 02/24/2014 09:52 AM  
 CHOL/HDL Ratio 2.2 02/24/2014 09:52 AM  
  
BNP No results for input(s): BNPP in the last 72 hours. Liver Enzymes Recent Labs  
  02/21/19 
2200 TP 8.1 ALB 3.3* * SGOT 33 Thyroid Studies Lab Results Component Value Date/Time TSH 0.84 02/24/2014 09:52 AM  
    
Procedures/imaging: see electronic medical records for all procedures/Xrays and details which were not copied into this note but were reviewed prior to creation of Plan Sharron Honeycutt MD

## 2019-02-22 NOTE — PROGRESS NOTES
Pt transferred to room 328. Pt stable. Dressing CDI. Mila Knight, RN at bedside. 02/22/19 1852 Vitals Temp 98.4 °F (36.9 °C) Temp Source Oral  
Pulse (Heart Rate) 94 Resp Rate 14  
O2 Sat (%) 100 % Level of Consciousness Alert  
BP (!) 179/98 MAP (Calculated) 125 MEWS Score 0

## 2019-02-22 NOTE — PERIOP NOTES
TRANSFER - OUT REPORT: 
 
Verbal report given to ESTELA Navarro RN (name) on [employer]  being transferred to  (unit) for routine post - op Report consisted of patients Situation, Background, Assessment and  
Recommendations(SBAR). Information from the following report(s) SBAR, Kardex, OR Summary, Intake/Output and MAR was reviewed with the receiving nurse. Lines: PICC Double Lumen 10/29/18 Right;Brachial (Active) Central Line Being Utilized Yes 2/22/2019 10:12 AM  
Criteria for Appropriate Use Long term IV/antibiotic administration 2/22/2019 10:12 AM  
Site Assessment Clean, dry, & intact 2/22/2019 10:12 AM  
Phlebitis Assessment 0 2/22/2019 10:12 AM  
Infiltration Assessment 0 2/22/2019 10:12 AM  
Date of Last Dressing Change 02/20/19 2/22/2019 10:12 AM  
Dressing Status Clean, dry, & intact 2/22/2019 10:12 AM  
Action Taken Zeroed/Rezeroed 2/22/2019 10:12 AM  
Dressing Type Disk with Chlorhexadine gluconate (CHG) 2/22/2019 10:12 AM  
Hub Color/Line Status Purple;Flushed 2/22/2019 10:12 AM  
Positive Blood Return (Site #1) Yes 2/21/2019  1:00 PM  
Hub Color/Line Status Red;Capped 2/22/2019 10:12 AM  
Positive Blood Return (Site #2) Yes 2/22/2019  3:00 AM  
Alcohol Cap Used Yes 2/22/2019 10:12 AM  
   
PICC Double Lumen 47/38/29 Left;Basilic (Active) Peripheral IV 02/22/19 Right Antecubital (Active) Site Assessment Clean, dry, & intact 2/22/2019  5:33 PM  
Phlebitis Assessment 0 2/22/2019  5:33 PM  
Infiltration Assessment 0 2/22/2019  5:33 PM  
Dressing Status Clean, dry, & intact 2/22/2019  5:33 PM  
Dressing Type Transparent;Tape 2/22/2019  5:33 PM  
Hub Color/Line Status Blue; Infusing 2/22/2019  5:33 PM  
  
 
Opportunity for questions and clarification was provided. Patient transported with: 
 O2 @ 2 liters Registered Nurse

## 2019-02-22 NOTE — ROUTINE PROCESS
TRANSFER - OUT REPORT: 
 
Verbal report given to Chelsie Lee RN(name) on [employer]  being transferred to OR(unit) for routine progression of care Report consisted of patients Situation, Background, Assessment and  
Recommendations(SBAR). Information from the following report(s) SBAR, Kardex, Intake/Output and MAR was reviewed with the receiving nurse. Lines: PICC Double Lumen 10/29/18 Right;Brachial (Active) Central Line Being Utilized Yes 2/22/2019 10:12 AM  
Criteria for Appropriate Use Long term IV/antibiotic administration 2/22/2019 10:12 AM  
Site Assessment Clean, dry, & intact 2/22/2019 10:12 AM  
Phlebitis Assessment 0 2/22/2019 10:12 AM  
Infiltration Assessment 0 2/22/2019 10:12 AM  
Date of Last Dressing Change 02/20/19 2/22/2019 10:12 AM  
Dressing Status Clean, dry, & intact 2/22/2019 10:12 AM  
Action Taken Zeroed/Rezeroed 2/22/2019 10:12 AM  
Dressing Type Disk with Chlorhexadine gluconate (CHG) 2/22/2019 10:12 AM  
Hub Color/Line Status Purple;Flushed 2/22/2019 10:12 AM  
Positive Blood Return (Site #1) Yes 2/21/2019  1:00 PM  
Hub Color/Line Status Red;Capped 2/22/2019 10:12 AM  
Positive Blood Return (Site #2) Yes 2/22/2019  3:00 AM  
Alcohol Cap Used Yes 2/22/2019 10:12 AM  
   
PICC Double Lumen 38/04/47 Left;Basilic (Active) Opportunity for questions and clarification was provided. Patient transported with: 
 Zodio

## 2019-02-22 NOTE — PERIOP NOTES
Dr. Elly Montaño made aware PICC line is clotted. Jose Daniel Vides RN started new IV. Receive no new orders at this time.

## 2019-02-22 NOTE — ED PROVIDER NOTES
EMERGENCY DEPARTMENT HISTORY AND PHYSICAL EXAM 
 
Date: 2/21/2019 Patient Name: Jasmina Martinez History of Presenting Illness Chief Complaint Patient presents with  Foot Pain History Provided By: patient Chief Complaint: foot pain Duration: 4 months and worsening in the past month Timing: chronic problem with acute exacerbation Location: left Quality: sharp Severity: 10/10 Modifying Factors: improved some with Percocet Associated Symptoms:  
 
Additional History (Context):  
8:51 PM 
Hussin Griselda Neu is a 62 y.o. male with PMHX of chronic pancreatitis, HTN, and gout who presents to the emergency department C/O 10/10 left foot pain for the past 4 months and worsening for the past month. Pt states that he had surgery with Dr. Neymar Sheridan in October 2018 for necrotic great toe and had hardware placed which is when this pain initially began. Associated sxs include left ankle pain. He followed up with [facility] today and was told to come to ED for further pain control. [facility] also told pt that he would need BKA in near future but does not have surgery scheduled at this time. He normally takes Percocet for pain with some improvement and last took at 10 am. Pt had surgery with [facility] earlier this month for hardware removal. Pt denies fever, chills, diaphoresis, and any other sxs or complaints. PCP: Nereyda Rosario MD 
 
Current Facility-Administered Medications Medication Dose Route Frequency Provider Last Rate Last Dose  albuterol-ipratropium (DUO-NEB) 2.5 MG-0.5 MG/3 ML  3 mL Nebulization Q4H RT Viktoriya Barfield MD   Stopped at 02/22/19 0400  
 sodium chloride (NS) flush 5-40 mL  5-40 mL IntraVENous Q8H Viktoriya Barfield MD      
 sodium chloride (NS) flush 5-40 mL  5-40 mL IntraVENous PRN Viktoriya Barfield MD      
 LORazepam (ATIVAN) tablet 1 mg  1 mg Oral Q1H PRN Viktoriya Barfield MD      
 Or  
  LORazepam (ATIVAN) injection 1 mg  1 mg IntraVENous Q1H PRN Hansa Barfield MD      
 LORazepam (ATIVAN) tablet 2 mg  2 mg Oral Q1H PRN Hansa Barfield MD      
 Or  
 LORazepam (ATIVAN) injection 2 mg  2 mg IntraVENous Q1H PRN Hansa Barfield MD      
 LORazepam (ATIVAN) injection 3 mg  3 mg IntraVENous Q15MIN PRN Hansa Barfield MD      
 amLODIPine (NORVASC) tablet 10 mg  10 mg Oral DAILY Hansa Barfield MD      
 atorvastatin (LIPITOR) tablet 20 mg  20 mg Oral DAILY Hansa Barfield MD      
 cloNIDine HCl (CATAPRES) tablet 0.1 mg  0.1 mg Oral BID Hansa Barfield MD      
 colchicine tablet 0.6 mg  0.6 mg Oral DAILY Hansa Barfield MD      
 famotidine (PEPCID) tablet 20 mg  20 mg Oral DAILY Hansa Barfield MD      
 folic acid (FOLVITE) tablet 1 mg  1 mg Oral DAILY Hansa Barfield MD      
 HYDROcodone-acetaminophen (NORCO) 5-325 mg per tablet 1 Tab  1 Tab Oral Q4H PRN Hansa Barfield MD      
 hydrOXYzine HCl (ATARAX) tablet 25 mg  25 mg Oral TID PRN Hansa Barfield MD   25 mg at 02/22/19 2474  lipase-protease-amylase (CREON 12,000) capsule 1 Cap  1 Cap Oral TID Hansa Barfield MD      
 lisinopril (PRINIVIL, ZESTRIL) tablet 40 mg  40 mg Oral DAILY Hansa Barfield MD      
 oxyCODONE-acetaminophen (PERCOCET 7.5) 7.5-325 mg per tablet 1 Tab  1 Tab Oral Q6H PRN Hansa Barfield MD   1 Tab at 02/22/19 4701  thiamine mononitrate (B-1) tablet 100 mg  100 mg Oral DAILY Hansa Barfield MD      
 heparin (porcine) injection 5,000 Units  5,000 Units SubCUTAneous Q8H Hansa Barfield MD   5,000 Units at 02/22/19 1708  cefTRIAXone (ROCEPHIN) 2 g in sodium chloride 0.9% 20 mL IV syringe  2 g IntraVENous Q24H Hansa Barfield MD      
 0.9% sodium chloride infusion 1,000 mL  1,000 mL IntraVENous CONTINUOUS Frandy Douglas NP   1,000 mL at 02/21/19 7944  vancomycin (VANCOCIN) 750 mg in 0.9% sodium chloride 250 mL IVPB  750 mg IntraVENous Q24H Ena Farr NP      
 vancomycin- pharmacy to dose  1 Each Other Rx Dosing/Monitoring Davin Ng NP Facility-Administered Medications Ordered in Other Encounters Medication Dose Route Frequency Provider Last Rate Last Dose  sodium chloride (NS) flush 5-10 mL  5-10 mL IntraVENous PRN Tahmina Rodrigues MD   10 mL at 02/21/19 1337  
 sodium chloride (NS) flush 5-10 mL  5-10 mL IntraVENous PRN Tahmina Rodrigues MD   10 mL at 02/20/19 1442  sodium chloride (NS) flush 10 mL  10 mL IntraVENous PRN Dannie Templeton MD   10 mL at 02/19/19 1334  sodium chloride (NS) flush 10-40 mL  10-40 mL IntraVENous PRN Tahmina Rodrigues MD   20 mL at 02/18/19 1356  heparin (porcine) pf 500 Units  500 Units InterCATHeter PRN Tahmina Rodrigues MD   500 Units at 02/18/19 1356 Past History Past Medical History: 
Past Medical History:  
Diagnosis Date  Gout  Hypertension  Pancreatitis chronic Past Surgical History: 
Past Surgical History:  
Procedure Laterality Date  HX CHOLECYSTECTOMY  VASCULAR SURGERY PROCEDURE UNLIST    
 vascular Family History: 
History reviewed. No pertinent family history. Social History: 
Social History Tobacco Use  Smoking status: Current Every Day Smoker Packs/day: 0.50 Years: 20.00 Pack years: 10.00  Smokeless tobacco: Never Used  Tobacco comment: using chantix Substance Use Topics  Alcohol use: Yes Alcohol/week: 1.2 oz Types: 2 Cans of beer per week  Drug use: No  
 
 
Allergies: 
No Known Allergies Review of Systems Review of Systems Constitutional: Negative for chills, diaphoresis and fever. Musculoskeletal: Positive for arthralgias (left ankle) and myalgias (left foot). All other systems reviewed and are negative. Physical Exam  
 
Vitals: 02/21/19 1937 02/21/19 2230 02/21/19 2300 02/22/19 0030 BP: 144/85 (!) 150/98 (!) 151/91 167/81 Pulse: (!) 117 100 89 80 Resp: 16 16 16 16 Temp: 98.7 °F (37.1 °C) 98.4 °F (36.9 °C) 98.2 °F (36.8 °C) 98.5 °F (36.9 °C) SpO2: 100% 100% 95% 98% Weight: 49 kg (108 lb) Height: 5' 4\" (1.626 m) Physical Exam  
Constitutional: He is oriented to person, place, and time. Chronically ill appearing,  [place] male, anxious and irritated HENT:  
Head: Normocephalic and atraumatic. Eyes: Conjunctivae are normal.  
Neck: Normal range of motion. Cardiovascular: Regular rhythm. tachycardic Pulmonary/Chest: Effort normal and breath sounds normal.  
Abdominal: Soft. Bowel sounds are normal. He exhibits no distension. There is no tenderness. There is no rebound. Musculoskeletal: Normal range of motion. Feet: 
 
Neurological: He is alert and oriented to person, place, and time. Skin: Skin is warm and dry. Nursing note and vitals reviewed. Diagnostic Study Results Labs - Recent Results (from the past 12 hour(s)) CBC WITH AUTOMATED DIFF Collection Time: 02/21/19 10:00 PM  
Result Value Ref Range WBC 13.4 (H) 4.6 - 13.2 K/uL  
 RBC 4.09 (L) 4.70 - 5.50 M/uL  
 HGB 11.3 (L) 13.0 - 16.0 g/dL HCT 36.5 36.0 - 48.0 % MCV 89.2 74.0 - 97.0 FL  
 MCH 27.6 24.0 - 34.0 PG  
 MCHC 31.0 31.0 - 37.0 g/dL  
 RDW 12.6 11.6 - 14.5 % PLATELET 153 (H) 784 - 420 K/uL MPV 8.8 (L) 9.2 - 11.8 FL  
 NEUTROPHILS 74 (H) 40 - 73 % LYMPHOCYTES 14 (L) 21 - 52 % MONOCYTES 6 3 - 10 % EOSINOPHILS 6 (H) 0 - 5 % BASOPHILS 0 0 - 2 %  
 ABS. NEUTROPHILS 9.9 (H) 1.8 - 8.0 K/UL  
 ABS. LYMPHOCYTES 1.9 0.9 - 3.6 K/UL  
 ABS. MONOCYTES 0.8 0.05 - 1.2 K/UL  
 ABS. EOSINOPHILS 0.7 (H) 0.0 - 0.4 K/UL  
 ABS. BASOPHILS 0.0 0.0 - 0.1 K/UL  
 DF AUTOMATED METABOLIC PANEL, COMPREHENSIVE Collection Time: 02/21/19 10:00 PM  
Result Value Ref Range Sodium 135 (L) 136 - 145 mmol/L Potassium 3.9 3.5 - 5.5 mmol/L Chloride 100 100 - 108 mmol/L  
 CO2 25 21 - 32 mmol/L Anion gap 10 3.0 - 18 mmol/L Glucose 104 (H) 74 - 99 mg/dL BUN 12 7.0 - 18 MG/DL Creatinine 1.44 (H) 0.6 - 1.3 MG/DL  
 BUN/Creatinine ratio 8 (L) 12 - 20 GFR est AA >60 >60 ml/min/1.73m2 GFR est non-AA 50 (L) >60 ml/min/1.73m2 Calcium 8.6 8.5 - 10.1 MG/DL Bilirubin, total 0.1 (L) 0.2 - 1.0 MG/DL  
 ALT (SGPT) 23 16 - 61 U/L  
 AST (SGOT) 33 15 - 37 U/L Alk. phosphatase 151 (H) 45 - 117 U/L Protein, total 8.1 6.4 - 8.2 g/dL Albumin 3.3 (L) 3.4 - 5.0 g/dL Globulin 4.8 (H) 2.0 - 4.0 g/dL A-G Ratio 0.7 (L) 0.8 - 1.7 EKG, 12 LEAD, INITIAL Collection Time: 02/22/19  2:16 AM  
Result Value Ref Range Ventricular Rate 82 BPM  
 Atrial Rate 82 BPM  
 P-R Interval 148 ms QRS Duration 78 ms Q-T Interval 348 ms QTC Calculation (Bezet) 406 ms Calculated P Axis 43 degrees Calculated R Axis -7 degrees Calculated T Axis 55 degrees Diagnosis Normal sinus rhythm Normal ECG When compared with ECG of 07-FEB-2019 12:59, 
Nonspecific T wave abnormality, improved in Lateral leads Radiologic Studies -  
XR CHEST PORT Final Result Impression:  
-------------- No active pulmonary disease. PICC line in place. XR FOOT LT MIN 3 V    (Results Pending) RADIOLOGY FINDINGS Left Foot X-ray shows erosion of the left great toe; possible osteomyelitis Pending review by Radiologist 
Recorded by MAGALY Barrios, as dictated by Gwen Pringle [facility] 
 
CT Results  (Last 48 hours) None CXR Results  (Last 48 hours) 02/22/19 0200  XR CHEST PORT Final result Impression:  Impression:  
-------------- No active pulmonary disease. PICC line in place.   
   
   
  
 Narrative:  ---------------------------------------------------------------------------  
 <<<<<<<<<           [facility]           >>>>>>>>>   
--------------------------------------------------------------------------- CLINICAL HISTORY:  Shortness of breath. COMPARISON EXAMINATIONS:  October 4, 2014.  
   
   
---  SINGLE FRONTAL VIEW OF THE CHEST  --- The lungs and pleural spaces are clear. The mediastinum is unremarkable in  
appearance. A PICC line tip is at the level of the lower SVC. No significant  
osseous abnormalities are identified.    
   
   
-------------- Medications given in the ED- Medications  
0.9% sodium chloride infusion 1,000 mL (1,000 mL IntraVENous New Bag 2/21/19 2229)  
vancomycin (VANCOCIN) 750 mg in 0.9% sodium chloride 250 mL IVPB (not administered)  
vancomycin- pharmacy to dose (not administered)  
albuterol-ipratropium (DUO-NEB) 2.5 MG-0.5 MG/3 ML (0 mL Nebulization Held 2/22/19 0400)  
sodium chloride (NS) flush 5-40 mL ( IntraVENous Canceled Entry 2/22/19 0200)  
sodium chloride (NS) flush 5-40 mL (not administered) LORazepam (ATIVAN) tablet 1 mg (not administered) Or LORazepam (ATIVAN) injection 1 mg (not administered) LORazepam (ATIVAN) tablet 2 mg (not administered) Or LORazepam (ATIVAN) injection 2 mg (not administered) LORazepam (ATIVAN) injection 3 mg (not administered) amLODIPine (NORVASC) tablet 10 mg (not administered)  
atorvastatin (LIPITOR) tablet 20 mg (not administered)  
cloNIDine HCl (CATAPRES) tablet 0.1 mg (not administered)  
colchicine tablet 0.6 mg (not administered)  
famotidine (PEPCID) tablet 20 mg (not administered)  
folic acid (FOLVITE) tablet 1 mg (not administered) HYDROcodone-acetaminophen (NORCO) 5-325 mg per tablet 1 Tab (not administered)  
hydrOXYzine HCl (ATARAX) tablet 25 mg (25 mg Oral Given 2/22/19 0483) lipase-protease-amylase (CREON 12,000) capsule 1 Cap (not administered)  
lisinopril (PRINIVIL, ZESTRIL) tablet 40 mg (not administered) oxyCODONE-acetaminophen (PERCOCET 7.5) 7.5-325 mg per tablet 1 Tab (1 Tab Oral Given 2/22/19 0233) thiamine mononitrate (B-1) tablet 100 mg (not administered)  
heparin (porcine) injection 5,000 Units (5,000 Units SubCUTAneous Given 2/22/19 0235) cefTRIAXone (ROCEPHIN) 2 g in sodium chloride 0.9% 20 mL IV syringe (not administered)  
oxyCODONE-acetaminophen (PERCOCET) 5-325 mg per tablet 1 Tab (1 Tab Oral Given 2/21/19 2136) morphine injection 4 mg (4 mg IntraVENous Given 2/21/19 2251) vancomycin (VANCOCIN) 1250 mg in  ml infusion (1,250 mg IntraVENous New Bag 2/22/19 0030) Medical Decision Making I am the first provider for this patient. I reviewed the vital signs, available nursing notes, past medical history, past surgical history, family history and social history. Vital Signs-Reviewed the patient's vital signs. Pulse Oximetry Analysis - 100% on room air Records Reviewed: Nursing Notes and Old Medical Records Provider Notes (Medical Decision Making): Pt presents after being evaluated by podiatry today for increasing pain and necrosis of left great toe. Labs show mildly elevated WBC of 13. Afebrile. XR shows erosion of great toe. Started on vancomycin. Will be admitted for pain control and most likely amputation of foot per podiatry. Stable during stay and agreeable with treatment plan. Procedures: 
Procedures ED Course:  
8:51 PM Initial assessment performed. The patients presenting problems have been discussed, and they are in agreement with the care plan formulated and outlined with them. I have encouraged them to ask questions as they arise throughout their visit. 9:30 PM Case discussed with Dr. Sweetie Mccall DPM, who would like pt to be admitted under hospitalist and will consult and take to surgery possibly tomorrow or Saturday. 11:47 PM Case discussed with Diomedes Camilo MD who accepts pt to medical.  
 
Diagnosis and Disposition Critical Care Time: 0 Core Measures: 
For Hospitalized Patients: 
 
1. Hospitalization Decision Time: The decision to hospitalize the patient was made by Italia WARE at 9:30 PM on 2/21/2019 2. Aspirin: not given 9:35 PM  Patient is being admitted to the hospital by Beverly Marc MD. The results of their tests and reasons for their admission have been discussed with them and/or available family. They convey agreement and understanding for the need to be admitted and for their admission diagnosis. CONDITIONS ON ADMISSION: 
Sepsis is not present at the time of admission. Deep Vein Thrombosis is not present at the time of admission. Thrombosis is not present at the time of admission. MRSA is not present at the time of admission. Wound infection is present at the time of admission. Pressure Ulcer is not present at the time of admission. CLINICAL IMPRESSION: 
 
1. Gangrene ([address] 2. Hyponatremia 3. Leukocytosis, unspecified type 4. Dehydration PLAN: 
1. Admit to Medical  
_______________________________ Attestations: This note is prepared by Mukul Ortiz, acting as Scribe for Italia WARE. Italia WARE:  The scribe's documentation has been prepared under my direction and personally reviewed by me in its entirety. I confirm that the note above accurately reflects all work, treatment, procedures, and medical decision making performed by me. 
_______________________________

## 2019-02-22 NOTE — ROUTINE PROCESS
TRANSFER - IN REPORT: 
 
Verbal report received from MARICARMEN Quinn RN(name) on [employer]  being received from [facility]) for routine post - op Report consisted of patients Situation, Background, Assessment and  
Recommendations(SBAR). Information from the following report(s) SBAR, Kardex, Procedure Summary, Intake/Output and MAR was reviewed with the receiving nurse. Opportunity for questions and clarification was provided. Assessment completed upon patients arrival to unit and care assumed.

## 2019-02-22 NOTE — ED TRIAGE NOTES
Pt arrives to ED with c\o left foot pain, pt sts he was told today he is to have amputation to BKA of LLE eventually, pt sts he needs pain control, pt is able to make needs known speaking in complete sentences, pt in nad at this time

## 2019-02-22 NOTE — ROUTINE PROCESS
TRANSFER - IN REPORT: 
 
Verbal report received from Jared Krishna RN (name) on [facility]  being received from ED (unit) for routine progression of care Report consisted of patients Situation, Background, Assessment and  
Recommendations(SBAR). Information from the following report(s) SBAR, Kardex, ED Summary, Procedure Summary, Intake/Output, MAR, Recent Results and Med Rec Status was reviewed with the receiving nurse. Opportunity for questions and clarification was provided. Assessment completed upon patients arrival to unit and care assumed.

## 2019-02-22 NOTE — ANESTHESIA PREPROCEDURE EVALUATION
Anesthetic History No history of anesthetic complications Review of Systems / Medical History Patient summary reviewed, nursing notes reviewed and pertinent labs reviewed Pulmonary Within defined limits Neuro/Psych Within defined limits Cardiovascular Hypertension GI/Hepatic/Renal 
Within defined limits Endo/Other Within defined limits Other Findings Physical Exam 
 
Airway Mallampati: II 
TM Distance: 4 - 6 cm Neck ROM: normal range of motion Mouth opening: Normal 
 
 Cardiovascular Regular rate and rhythm,  S1 and S2 normal,  no murmur, click, rub, or gallop Dental 
 
Dentition: Full upper dentures Pulmonary Breath sounds clear to auscultation Abdominal 
GI exam deferred Other Findings Anesthetic Plan ASA: 3 Anesthesia type: general 
 
 
 
 
Induction: Intravenous Anesthetic plan and risks discussed with: Patient

## 2019-02-22 NOTE — PROGRESS NOTES
Problem: Falls - Risk of 
Goal: *Absence of Falls Document Orvel Buffy Fall Risk and appropriate interventions in the flowsheet. Outcome: Progressing Towards Goal 
Fall Risk Interventions: 
Mobility Interventions: Assess mobility with egress test, Patient to call before getting OOB Medication Interventions: Evaluate medications/consider consulting pharmacy, Patient to call before getting OOB Elimination Interventions: Call light in reach, Patient to call for help with toileting needs

## 2019-02-22 NOTE — INTERVAL H&P NOTE
H&P Update: 
Arlet Manzano was seen and examined. History and physical has been reviewed. The patient has been examined.  There have been no significant clinical changes since the completion of the originally dated History and Physical. 
 
Signed By: Tiana Maldonado DPM   
 February 22, 2019 3:58 PM

## 2019-02-22 NOTE — H&P
History & Physical 
Patient: Janeth Zheng MRN: [id number]  CSN: [id number] YOB: 1960  Age: 62 y.o. Sex: male DOA: 2/21/2019 Chief Complaint:  
Chief Complaint Patient presents with  Foot Pain HPI:  
 
Janeth Zheng is a 62 y.o.  male who has hx of Gout , Chronic Pancreatitis, PVD  Recently discharged post foot surgery on IV Ceftriaxone for infected hardwire Returns today to ER due to worsening pain and poor healing; He saw podiatrist who requested admission for IV antibiotics and amputation . With in the next few days. Patient has been on oxycodone for pain and states he has cut back on his drinking but did not clarify how much he has been drinking. Last admission had mental status change post op with DT's ;  
 
 
Past Medical History:  
Diagnosis Date  Gout  Hypertension  Pancreatitis chronic Past Surgical History:  
Procedure Laterality Date  HX CHOLECYSTECTOMY  VASCULAR SURGERY PROCEDURE UNLIST    
 vascular History reviewed. No pertinent family history. Social History Socioeconomic History  Marital status:  Spouse name: Not on file  Number of children: Not on file  Years of education: Not on file  Highest education level: Not on file Tobacco Use  Smoking status: Current Every Day Smoker Packs/day: 0.50 Years: 20.00 Pack years: 10.00  Smokeless tobacco: Never Used  Tobacco comment: using chantix Substance and Sexual Activity  Alcohol use: Yes Alcohol/week: 1.2 oz Types: 2 Cans of beer per week  Drug use: No  
Other Topics Concern Prior to Admission medications Medication Sig Start Date End Date Taking? Authorizing Provider  
oxyCODONE-acetaminophen (PERCOCET) 7.5-325 mg per tablet Take 1 Tab by mouth every six (6) hours as needed for Pain.     Provider, Historical  
 atorvastatin (LIPITOR) 20 mg tablet Take 20 mg by mouth daily. Jenifer Beard MD  
hydrOXYzine HCl (ATARAX) 25 mg tablet Take 25 mg by mouth every eight (8) hours as needed for Anxiety or Itching. Jenifer Beard MD  
HYDROcodone-acetaminophen (NORCO) 5-325 mg per tablet Take 1 Tab by mouth every eight (8) hours as needed for Pain. 2/11/19   Shweta Chow MD  
cloNIDine HCl (CATAPRES) 0.1 mg tablet Take 1 Tab by mouth two (2) times a day. 2/11/19   Shweta Chow MD  
folic acid (FOLVITE) 1 mg tablet Take 1 Tab by mouth daily. 2/12/19   Shweta Chow MD  
morphine CR (MS CONTIN) 15 mg CR tablet Take 1 Tab by mouth every twelve (12) hours. Max Daily Amount: 30 mg. 2/11/19   Shweta Chow MD  
thiamine mononitrate (B-1) 100 mg tablet Take 1 Tab by mouth daily. 2/12/19   Shweta Chow MD  
cefTRIAXone (ROCEPHIN) 2 gram solr 2 g by IntraVENous route every twenty-four (24) hours. 2/11/19   Shweta Chow MD  
colchicine (COLCRYS) 0.6 mg tablet Take 0.6 mg by mouth daily. Provider, Historical  
famotidine (PEPCID) 20 mg tablet Take 20 mg by mouth daily. Provider, Historical  
triamcinolone acetonide (KENALOG) 0.1 % topical cream Apply  to affected area. use thin layer    Provider, Historical  
LIPASE/PROTEASE/AMYLASE (CREON PO) Take 1 Tab by mouth three (3) times daily. Other, MD An  
lisinopril (PRINIVIL, ZESTRIL) 40 mg tablet Take 40 mg by mouth daily. An Zheng MD  
amLODIPine (NORVASC) 10 mg tablet Take 1 Tab by mouth daily. 6/3/13   Michael Morris MD  
 
 
No Known Allergies Review of Systems GENERAL: Patient alert, awake and oriented times 3, able to communicate full sentences and not in distress. HEENT: No change in vision, no earache, tinnitus, sore throat or sinus congestion. NECK: No pain or stiffness. PULMONARY: No shortness of breath, cough or wheeze. Cardiovascular: no pnd or orthopnea, no CP GASTROINTESTINAL: No abdominal pain, nausea, vomiting or diarrhea, melena or bright red blood per rectum. GENITOURINARY: No urinary frequency, urgency, hesitancy or dysuria. MUSCULOSKELETAL: +joint or muscle pain, no back pain,+ recent trauma. Pain since October from big toe surgery DERMATOLOGIC: No rash, no itching, no lesions. ENDOCRINE: No polyuria, polydipsia, no heat or cold intolerance. No recent change in weight. HEMATOLOGICAL: No anemia or easy bruising or bleeding. NEUROLOGIC: No headache, seizures, numbness, tingling or weakness. Physical Exam:  
 
Physical Exam: 
Visit Vitals /81 Pulse 80 Temp 98.5 °F (36.9 °C) Resp 16 Ht 5' 4\" (1.626 m) Wt 49 kg (108 lb) SpO2 98% BMI 18.54 kg/m² O2 Device: Room air Temp (24hrs), Av.3 °F (36.8 °C), Min:97.9 °F (36.6 °C), Max:98.7 °F (37.1 °C) 
   1901 -  0700 In: -  
Out: 400 [Urine:400]   No intake/output data recorded. General:  Alert, cooperative, no distress, appears stated age. Head: Normocephalic, without obvious abnormality, atraumatic. Eyes:  Conjunctivae/corneas clear. PERRL, EOMs intact. Nose: Nares normal. No drainage or sinus tenderness. Neck: Supple, symmetrical, trachea midline, no adenopathy, thyroid: no enlargement, no carotid bruit and no JVD. Lungs:   Clear to auscultation bilaterally. dimished breath sounds Heart:  Regular rate and rhythm, S1, S2 normal.  
  Abdomen: Soft, non-tender. Bowel sounds normal.   
Extremities: Extremities normal, atraumatic, no cyanosis or edema. Pulses: +1 and symmetric all extremities. Skin: Dry with echymosis left big toe with sutures and necrotic weak distal pulse Neurologic: AAOx3, No focal motor or sensory deficit. Labs Reviewed: All lab results for the last 24 hours reviewed. CT Results  (Last 48 hours) None  
  
 
 and EKG Procedures/imaging: see electronic medical records for all procedures/Xrays and details which were not copied into this note but were reviewed prior to creation of Plan Recent Results (from the past 24 hour(s)) CBC WITH AUTOMATED DIFF Collection Time: 02/21/19 10:00 PM  
Result Value Ref Range WBC 13.4 (H) 4.6 - 13.2 K/uL  
 RBC 4.09 (L) 4.70 - 5.50 M/uL  
 HGB 11.3 (L) 13.0 - 16.0 g/dL HCT 36.5 36.0 - 48.0 % MCV 89.2 74.0 - 97.0 FL  
 MCH 27.6 24.0 - 34.0 PG  
 MCHC 31.0 31.0 - 37.0 g/dL  
 RDW 12.6 11.6 - 14.5 % PLATELET 615 (H) 031 - 420 K/uL MPV 8.8 (L) 9.2 - 11.8 FL  
 NEUTROPHILS 74 (H) 40 - 73 % LYMPHOCYTES 14 (L) 21 - 52 % MONOCYTES 6 3 - 10 % EOSINOPHILS 6 (H) 0 - 5 % BASOPHILS 0 0 - 2 %  
 ABS. NEUTROPHILS 9.9 (H) 1.8 - 8.0 K/UL  
 ABS. LYMPHOCYTES 1.9 0.9 - 3.6 K/UL  
 ABS. MONOCYTES 0.8 0.05 - 1.2 K/UL  
 ABS. EOSINOPHILS 0.7 (H) 0.0 - 0.4 K/UL  
 ABS. BASOPHILS 0.0 0.0 - 0.1 K/UL  
 DF AUTOMATED METABOLIC PANEL, COMPREHENSIVE Collection Time: 02/21/19 10:00 PM  
Result Value Ref Range Sodium 135 (L) 136 - 145 mmol/L Potassium 3.9 3.5 - 5.5 mmol/L Chloride 100 100 - 108 mmol/L  
 CO2 25 21 - 32 mmol/L Anion gap 10 3.0 - 18 mmol/L Glucose 104 (H) 74 - 99 mg/dL BUN 12 7.0 - 18 MG/DL Creatinine 1.44 (H) 0.6 - 1.3 MG/DL  
 BUN/Creatinine ratio 8 (L) 12 - 20 GFR est AA >60 >60 ml/min/1.73m2 GFR est non-AA 50 (L) >60 ml/min/1.73m2 Calcium 8.6 8.5 - 10.1 MG/DL Bilirubin, total 0.1 (L) 0.2 - 1.0 MG/DL  
 ALT (SGPT) 23 16 - 61 U/L  
 AST (SGOT) 33 15 - 37 U/L Alk. phosphatase 151 (H) 45 - 117 U/L Protein, total 8.1 6.4 - 8.2 g/dL Albumin 3.3 (L) 3.4 - 5.0 g/dL Globulin 4.8 (H) 2.0 - 4.0 g/dL A-G Ratio 0.7 (L) 0.8 - 1.7 Assessment/Plan Principal Problem: Foot infection (2/21/2019) Will start Rocephin and Lowell Hews Pending possible amputation Friday or Saturday Hold heparin 8 hours prior to surgery Active Problems: 
  Cellulitis of left foot (2/4/2019) Elevate leg IV Abx Alcohol use (2/4/2019) Placed on CI WA 
 Cont thiamine, folic acid, 
 
  Malnutrition ([facility] [address] (2/8/2019) Dietary consult for healing /protein shake Chronic Pancreatis Continue on Pancrease ? Hx of Cocaine use On chronic pain meds Will check UDS  
  
 
DVT/GI Prophylaxis: Hep SQ Discussed with patient at bedside about hospital admission and my plan care, who understood and agree with my plan care.  
 
Roya Marti MD 
2/22/2019 11:43 PM

## 2019-02-22 NOTE — PROGRESS NOTES
Pt underwent transmetatarsal amputation today with flap closure. The foot was noted to have further skin breakdown at the level of the toes and lateral forefoot indicating progressive disease. I made the decision to go ahead with TMA. I have concerns this may not heal however he showed better than expected perfusion intraoperatively. I will continue to follow as inpatient.  
 
Joanna Castro DPM,

## 2019-02-22 NOTE — PROGRESS NOTES
6:46 PM Pt returns to rm 328. Left foot wrapped in bulky gauze, no apparent drainage. Ice applied. 7:05 PM Order received from Dr. Lonny Moon, on call hospitalist for cathflow for PICC line clot. Cathflow given in both purple and red ports. Heparin, B-1 tablet, and zenpep PO given. SHIFT SUMMARY: Pt returned from surgery from podiatrist, alert. Resumed cardiac diet as pt was npo for surgery per Dr. Hatch Adjutant. Bedside and Verbal shift change report given to JYOTI Khan RN (oncoming nurse) by Ira Garcia RN  (offgoing nurse). Report included the following information SBAR, Kardex, Intake/Output and MAR.

## 2019-02-22 NOTE — ED NOTES
TRANSFER - OUT REPORT: 
 
Verbal report given to CHAZ Hewitt(name) on Glynn Lang  being transferred to (unit) for routine progression of care Report consisted of patients Situation, Background, Assessment and  
Recommendations(SBAR). Information from the following report(s) SBAR, ED Summary, [place] and Recent Results was reviewed with the receiving nurse. Lines: PICC Double Lumen 10/29/18 Right;Brachial (Active) Central Line Being Utilized Yes 2/21/2019  1:00 PM  
Criteria for Appropriate Use Long term IV/antibiotic administration 2/21/2019  1:00 PM  
Site Assessment Clean, dry, & intact 2/21/2019  1:00 PM  
Phlebitis Assessment 0 2/21/2019  1:00 PM  
Infiltration Assessment 0 2/21/2019  1:00 PM  
Date of Last Dressing Change 02/20/19 2/21/2019  1:00 PM  
Dressing Status Clean, dry, & intact 2/21/2019  1:00 PM  
Action Taken Wrapped 2/21/2019  1:00 PM  
Dressing Type Transparent 2/21/2019  1:00 PM  
Hub Color/Line Status Purple;Flushed 2/21/2019  1:00 PM  
Positive Blood Return (Site #1) Yes 2/21/2019  1:00 PM  
Hub Color/Line Status Red; Infusing;Flushed 2/21/2019  1:00 PM  
Positive Blood Return (Site #2) Yes 2/21/2019  1:00 PM  
Alcohol Cap Used Yes 2/21/2019  1:00 PM  
   
PICC Double Lumen 60/29/05 Left;Basilic (Active) Opportunity for questions and clarification was provided. Patient transported with: 
 [employer]

## 2019-02-22 NOTE — H&P
Progress Note Patient: Bethany Cassidy Said               Sex: male          DOA: 2/21/2019 YOB: 1960      Age:  62 y.o.        LOS:  LOS: 1 day Subjective:  
Arlet Bo is a 62 y.o. male  who presents with gangrene of the left great toe. He recently underwent hardware removal due to osteomyelitis but has developed gangrene due to severe vascular insufficiency. Objective:  
  
Visit Vitals /86 Pulse 92 Temp 98.3 °F (36.8 °C) Resp 18 Ht 5' 4\" (1.626 m) Wt 49 kg (108 lb) SpO2 96% BMI 18.54 kg/m² Physical Exam: 
Demarcated dry gangrene of the left foot. Very painful. Area of demarcation is at the mid 1st MT shaft No pulses. Severe pain Intake and Output: 
Current Shift:  02/22 0701 - 02/22 1900 In: -  
Out: 250 [Urine:250] Last three shifts:  02/20 1901 - 02/22 0700 In: -  
Out: 400 [Urine:400] Lab/Data Reviewed: All lab results for the last 24 hours reviewed. All lab results for the last 24 hours reviewed. Medications Reviewed Assessment/Plan Principal Problem: Foot infection (2/21/2019) Active Problems: 
  Cellulitis of left foot (2/4/2019) Alcohol use (2/4/2019) Malnutrition ([address] (2/8/2019) Gangrene ([address] (2/22/2019) Cellulitis (2/22/2019) For amputation today. Possible transmetatarsal amputation of the left foot. I am concerned this may progress to BKA I reviewed this with the patient in detail. He understands the risks, Fatuma Kendrick DPM 
February 22, 2019

## 2019-02-22 NOTE — BRIEF OP NOTE
BRIEF OPERATIVE NOTE Date of Procedure: 2/22/2019 Preoperative Diagnosis: CELLULITIS OF LEFT FOOT Postoperative Diagnosis: CELLULITIS OF LEFT FOOT Procedure(s): 
PARTIAL AMPUTATION OF LEFT FOOT WITH POSSIBLE TRANSMETATARSAL AMPUTATION, Adjadent soft tissue transfer >20cm Surgeon(s) and Role: * Isaías Raygoza DPM - Primary Surgical Assistant: None Surgical Staff: 
Circ-1: Jesica Cook RN Scrub Tech-1: Anika Mojica Scrub Tech-2: Migel Guillen Event Time In Time Out Incision Start 02/22/2019 1618 Incision Close 02/22/2019 1638 Anesthesia: General  
Estimated Blood Loss: 30cc Specimens: * No specimens in log * Findings: Severe vasculopathy, gangrene Complications: None Implants: * No implants in log *

## 2019-02-22 NOTE — PROGRESS NOTES
Pharmacy Dosing Services: Vancomycin Consult for Vancomycin Dosing by Pharmacy by Dr. Adriana Jaquez, NP Consult provided for this 62y.o. year old male , for indication of necrotic foot . Day of Therapy 1 Ht Readings from Last 1 Encounters:  
02/21/19 162.6 cm (64\") Wt Readings from Last 1 Encounters:  
02/21/19 49 kg (108 lb) Significant Cultures Pending from micro lab Serum Creatinine Lab Results Component Value Date/Time Creatinine 1.44 (H) 02/21/2019 10:00 PM  
  
Creatinine Clearance Estimated Creatinine Clearance: 37.3 mL/min (A) (based on SCr of 1.44 mg/dL (H)). BUN Lab Results Component Value Date/Time BUN 12 02/21/2019 10:00 PM  
  
WBC Lab Results Component Value Date/Time WBC 13.4 (H) 02/21/2019 10:00 PM  
  
H/H Lab Results Component Value Date/Time HGB 11.3 (L) 02/21/2019 10:00 PM  
  
Platelets Lab Results Component Value Date/Time PLATELET 683 (H) 87/64/7326 10:00 PM  
  
Temp 98.2 °F (36.8 °C) Start Vancomycin therapy, with loading dose of 1250 (mg) at 2300/ 02/21/2019(time/date). Follow with maintenance dose of 750(mg) at 2300 on 02/22/2019 (time/date), every 24 hours (frequency). Dose calculated to approximate a therapeutic trough of 15-20 mcg/mL. Estimated Pharmacokinetic Parameters (based on population kinetics) Vd: 34 L (0.7 L/kg) [place]: 0.033 hr-1 (T1/2 = 21 hrs) Dosing Recommendations Vancomycin dose: 750 mg IV Q24hrs (infused over 1 hr) Estimated peak: 39.3 mcg/mL Estimated trough: 18.4 mcg/mL Estimated AUC:MARY: 663 mcg*hr/mL (assumed MARY 1 mcg/mL) A/P:  
1. Recommend vancomycin 750 mg IV Q24hrs (15 mg/kg) 2. Consider a vancomycin trough level prior to the 4th dose. 3. Please monitor renal function (urine output, BUN/SCr). Dose adjustments may be necessary with a significant change in renal function.   
4. Vancomycin loading dose of 1250 mg to facilitate rapid attainment of target trough serum vancomycin levels.

## 2019-02-22 NOTE — PROGRESS NOTES
DC Plan: TBD Chart reviewed. Pt admitted for foot infection. Pts discharge: TBD. Pt will likely require surgical intervention with podiatry. Will need weight bearing status after surgery. Pt readmission with mod RRAT score. Pt previously dc with [facility] and [facility]. Attempted to see patient and he was sleeping. Considering possible amputation, will submit acute rehab referrals for continuity of care to [facility], [facility] and [facility]. CM will follow for transition of care needs. Reason for Readmission:  Per H&P, pt \"is a 62 y.o.  male who has hx of Gout , Chronic Pancreatitis, PVD  Recently discharged post foot surgery on IV Ceftriaxone for infected hardwire Returns today to ER due to worsening pain and poor healing; He saw podiatrist who requested admission for IV antibiotics and amputation . With in the next few days. Patient has been on oxycodone for pain and states he has cut back on his drinking but did not clarify how much he has been drinking. Last admission had mental status change post op with DT's\" RRAT Score and Risk Level:     16 mod Level of Readmission:   mod Care Conference scheduled:  n/a Resources/supports as identified by patient/family:    
    
Top Challenges facing patient (as identified by patient/family and CM): Finances/Medication cost?      
Transportation Support system or lack thereof? Living arrangements? Self-care/ADLs/Cognition? Current Advanced Directive/Advance Care Plan:  Not on file, provider please consider palliative care/spiritual care consult to address ACP Plan for utilizing home health:   Was previously active with [facility] Likelihood of additional readmission:   high Transition of Care Plan:    Based on readmission, the patient's previous Plan of Care 
 has been evaluated and/or modified.  The current Transition of Care Plan is:       Rehab vs HH vs OPIC Care Management Interventions Transition of Care Consult (CM Consult): Discharge Planning

## 2019-02-22 NOTE — ANESTHESIA POSTPROCEDURE EVALUATION
Procedure(s): 
PARTIAL AMPUTATION OF LEFT FOOT WITH  TRANSMETATARSAL AMPUTATION, SOFT TISSUE REARRANGEMENT GREATER THAN 20CM. Anesthesia Post Evaluation Comments: Post-Anesthesia Evaluation and Assessment Cardiovascular Function/Vital Signs /82   Pulse 90   Temp 36.7 °C (98 °F)   Resp 12   Ht 5' 4\" (1.626 m)   Wt 49 kg (108 lb)   SpO2 100%   BMI 18.54 kg/m² Patient is status post Procedure(s): 
PARTIAL AMPUTATION OF LEFT FOOT WITH  TRANSMETATARSAL AMPUTATION, SOFT TISSUE REARRANGEMENT GREATER THAN 20CM. Nausea/Vomiting: Controlled. Postoperative hydration reviewed and adequate. Pain: 
Pain Scale 1: Numeric (0 - 10) (02/22/19 1806) Pain Intensity 1: 7 (02/22/19 1806) Managed. Neurological Status:  
Neuro (WDL): Within Defined Limits (02/22/19 1759) At baseline. Mental Status and Level of Consciousness: Arousable. Pulmonary Status:  
O2 Device: Nasal cannula (02/22/19 1759) Adequate oxygenation and airway patent. Complications related to anesthesia: None Post-anesthesia assessment completed. No concerns. Patient has met all discharge requirements. Signed By: Em Gillette MD  
 February 22, 2019 Visit Vitals /82 Pulse 90 Temp 36.7 °C (98 °F) Resp 12 Ht 5' 4\" (1.626 m) Wt 49 kg (108 lb) SpO2 100% BMI 18.54 kg/m²

## 2019-02-22 NOTE — ROUTINE PROCESS
Bedside and Verbal shift change report given to Roman Hawkins RN (oncoming nurse) by Yuliana Vides RN (offgoing nurse). Report included the following information SBAR, Kardex, ED Summary, Procedure Summary, Intake/Output, MAR, Recent Results and Med Rec Status.

## 2019-02-22 NOTE — PROGRESS NOTES
1093 Per Dr. Haile Palmer, Hold heparin and make NPO for surgery at 1600.  
 
8332 Urine specimen sent to lab for drug screen. Informed pt that he is npo at this time to expect surgery at 4pm. 
 
1000 Clarified with Dr. Arizmendi Failing if pt should be on continuous IVF, 1 L normal saline was infused last night. 1443 Pt sent to OR holding with OR tech.

## 2019-02-22 NOTE — PROGRESS NOTES
INITIAL NUTRITION ASSESSMENT  
RECOMMENDATIONS/PLAN:  
recc creon once pt starts eating for chronic pancreatits Advance diet as soon as clinically feasible 
recc ensure enlive once diet is advanced Obtain accurate wt on pt to better assess for wt loss/fluid shifts Monitor labs/lytes, fluid status, skin integrity, bowel function REASON FOR ASSESSMENT:  
 
[x] Positive Nutrition Screen: 
[x] BMI <19 [] NPO/Clear Liquid Diet > 5 days (>3 days in ICU) [] New Order for TPN 
 
NUTRITION ASSESSMENT:  
Client History: 62 yrs old Male admitted with recurrent foot infection, gangrene and necrosis of big toe and amputation probably per podiatry per MD note. PMHx: gout, HTN, chronic pancreatitis Cultural/Voodoo Food Preferences: None Identified FOOD/NUTRITION HISTORY Diet History: some malnutrition likely PTA given BMI <19 Food Allergies:  [x] NKFA Pertinent PTA Medications: folvite, thiamine, pepcid, creon NUTRITION INTAKE Diet Order: NPO for procedure Average PO Intake:      
No data found. Pertinent Medications:  [x] Reviewed; pepcid, folvite, thiamine, Electrolyte Replacement Protocol: []K  []Mg  []PO4 Insulin:  [] SSI  [] Pre-meal   []  Basal   [] Drip  [x] None Pt expected to meet estimated nutrient needs through next review:          [x]  Yes     ANTHROPOMETRICS Height: 5' 4\" (162.6 cm)       Weight: 49 kg (108 lb) BMI: 18.5 kg/m^2  -  underweight (Less than or = to 18.5% BMI) Weight change: ? Accuracy of current wt as it appears stated, unable to to assess for wt loss at this time Comparison to Reference Standards: IBW: 132 lbs      %IBW: 82      AdjBW: n/a kg NUTRITION-FOCUSED PHYSICAL ASSESSMENT Skin: no pressure area per documentation GI: BM 2/21 BIOCHEMICAL DATA & MEDICAL TESTS Pertinent Labs:  [x] Reviewed; Na-135 NUTRITION PRESCRIPTION Calories: 1470 kcal/day based on 30kcal/kg Protein: 59 g/day based on 1.2 g/kg Fluid: 1470 ml/day based on 1 kcal/ml NUTRITION DIAGNOSES:  
1. inadequate energy intake related to to inadequate oral intake as evidence by BMI <19 NUTRITION INTERVENTIONS:  
INTERVENTIONS:     GOALS: 
1. Oral supplement: ensure tid 1. Order within 24 hours of diet advancement LEARNING NEEDS (Diet, Supplementation, Food/Nutrient-Drug Interaction):  
[x] None Identified 
[] Inpatient education provided/documented   
[] Identified and patient:  [] Declined     [] Was not appropriate/indicated NUTRITION MONITORING /EVALUATION:  
Follow PO intake Monitor wt Monitor renal labs, electrolytes, fluid status Monitor for additional supplement needs 
 
[] Participated in Interdisciplinary Rounds 
[x] [address] [place] [address] Reviewed/Documented DISCHARGE NUTRITION RECOMMENDATIONS ADDRESSED:  
  
  [x] To be determined closer to discharge NUTRITION RISK:     []  At risk                     []  Not currently at risk Will follow-up per policy. Yolande Brown 9

## 2019-02-23 ENCOUNTER — HOME CARE VISIT (OUTPATIENT)
Dept: HOME HEALTH SERVICES | Facility: HOME HEALTH | Age: 59
End: 2019-02-23
Payer: MEDICARE

## 2019-02-23 LAB
ANION GAP SERPL CALC-SCNC: 7 MMOL/L (ref 3–18)
ATRIAL RATE: 82 BPM
BUN SERPL-MCNC: 13 MG/DL (ref 7–18)
BUN/CREAT SERPL: 11 (ref 12–20)
CALCIUM SERPL-MCNC: 8.5 MG/DL (ref 8.5–10.1)
CALCULATED P AXIS, ECG09: 43 DEGREES
CALCULATED R AXIS, ECG10: -7 DEGREES
CALCULATED T AXIS, ECG11: 55 DEGREES
CHLORIDE SERPL-SCNC: 106 MMOL/L (ref 100–108)
CO2 SERPL-SCNC: 26 MMOL/L (ref 21–32)
CREAT SERPL-MCNC: 1.21 MG/DL (ref 0.6–1.3)
DIAGNOSIS, 93000: NORMAL
ERYTHROCYTE [DISTWIDTH] IN BLOOD BY AUTOMATED COUNT: 12.5 % (ref 11.6–14.5)
GLUCOSE SERPL-MCNC: 75 MG/DL (ref 74–99)
HCT VFR BLD AUTO: 32.2 % (ref 36–48)
HGB BLD-MCNC: 9.7 G/DL (ref 13–16)
MCH RBC QN AUTO: 27 PG (ref 24–34)
MCHC RBC AUTO-ENTMCNC: 30.1 G/DL (ref 31–37)
MCV RBC AUTO: 89.7 FL (ref 74–97)
P-R INTERVAL, ECG05: 148 MS
PLATELET # BLD AUTO: 425 K/UL (ref 135–420)
PMV BLD AUTO: 9 FL (ref 9.2–11.8)
POTASSIUM SERPL-SCNC: 4.1 MMOL/L (ref 3.5–5.5)
Q-T INTERVAL, ECG07: 348 MS
QRS DURATION, ECG06: 78 MS
QTC CALCULATION (BEZET), ECG08: 406 MS
RBC # BLD AUTO: 3.59 M/UL (ref 4.7–5.5)
SODIUM SERPL-SCNC: 139 MMOL/L (ref 136–145)
VENTRICULAR RATE, ECG03: 82 BPM
WBC # BLD AUTO: 8.5 K/UL (ref 4.6–13.2)

## 2019-02-23 PROCEDURE — 85027 COMPLETE CBC AUTOMATED: CPT

## 2019-02-23 PROCEDURE — 74011250636 HC RX REV CODE- 250/636: Performed by: NURSE PRACTITIONER

## 2019-02-23 PROCEDURE — 74011250637 HC RX REV CODE- 250/637: Performed by: INTERNAL MEDICINE

## 2019-02-23 PROCEDURE — 80048 BASIC METABOLIC PNL TOTAL CA: CPT

## 2019-02-23 PROCEDURE — 74011250636 HC RX REV CODE- 250/636: Performed by: HOSPITALIST

## 2019-02-23 PROCEDURE — 65270000029 HC RM PRIVATE

## 2019-02-23 PROCEDURE — 74011250636 HC RX REV CODE- 250/636: Performed by: INTERNAL MEDICINE

## 2019-02-23 PROCEDURE — 3331090002 HH PPS REVENUE DEBIT

## 2019-02-23 PROCEDURE — 77010033678 HC OXYGEN DAILY

## 2019-02-23 PROCEDURE — 3331090001 HH PPS REVENUE CREDIT

## 2019-02-23 PROCEDURE — 74011250637 HC RX REV CODE- 250/637: Performed by: HOSPITALIST

## 2019-02-23 PROCEDURE — 36592 COLLECT BLOOD FROM PICC: CPT

## 2019-02-23 RX ORDER — MORPHINE SULFATE 2 MG/ML
1 INJECTION, SOLUTION INTRAMUSCULAR; INTRAVENOUS
Status: DISCONTINUED | OUTPATIENT
Start: 2019-02-23 | End: 2019-02-25

## 2019-02-23 RX ORDER — GABAPENTIN 100 MG/1
200 CAPSULE ORAL 3 TIMES DAILY
Status: DISCONTINUED | OUTPATIENT
Start: 2019-02-23 | End: 2019-02-24

## 2019-02-23 RX ORDER — HYDROMORPHONE HYDROCHLORIDE 4 MG/1
4 TABLET ORAL
Status: DISCONTINUED | OUTPATIENT
Start: 2019-02-23 | End: 2019-02-25

## 2019-02-23 RX ADMIN — COLCHICINE 0.6 MG: 0.6 TABLET, FILM COATED ORAL at 08:36

## 2019-02-23 RX ADMIN — OXYCODONE HYDROCHLORIDE AND ACETAMINOPHEN 1 TABLET: 7.5; 325 TABLET ORAL at 02:51

## 2019-02-23 RX ADMIN — ATORVASTATIN CALCIUM 20 MG: 20 TABLET, FILM COATED ORAL at 08:36

## 2019-02-23 RX ADMIN — HYDROMORPHONE HYDROCHLORIDE 4 MG: 4 TABLET ORAL at 18:12

## 2019-02-23 RX ADMIN — HEPARIN SODIUM 5000 UNITS: 5000 INJECTION INTRAVENOUS; SUBCUTANEOUS at 11:46

## 2019-02-23 RX ADMIN — HEPARIN SODIUM 5000 UNITS: 5000 INJECTION INTRAVENOUS; SUBCUTANEOUS at 02:52

## 2019-02-23 RX ADMIN — HEPARIN SODIUM 5000 UNITS: 5000 INJECTION INTRAVENOUS; SUBCUTANEOUS at 18:12

## 2019-02-23 RX ADMIN — GABAPENTIN 200 MG: 100 CAPSULE ORAL at 15:35

## 2019-02-23 RX ADMIN — MORPHINE SULFATE 1 MG: 2 INJECTION, SOLUTION INTRAMUSCULAR; INTRAVENOUS at 05:46

## 2019-02-23 RX ADMIN — VANCOMYCIN HYDROCHLORIDE 750 MG: 10 INJECTION, POWDER, LYOPHILIZED, FOR SOLUTION INTRAVENOUS at 23:43

## 2019-02-23 RX ADMIN — CLONIDINE HYDROCHLORIDE 0.1 MG: 0.1 TABLET ORAL at 21:07

## 2019-02-23 RX ADMIN — Medication 100 MG: at 08:36

## 2019-02-23 RX ADMIN — SODIUM CHLORIDE 100 ML/HR: 900 INJECTION, SOLUTION INTRAVENOUS at 06:17

## 2019-02-23 RX ADMIN — Medication 10 ML: at 15:35

## 2019-02-23 RX ADMIN — HYDROMORPHONE HYDROCHLORIDE 4 MG: 4 TABLET ORAL at 21:53

## 2019-02-23 RX ADMIN — FAMOTIDINE 20 MG: 20 TABLET ORAL at 08:36

## 2019-02-23 RX ADMIN — HYDROMORPHONE HYDROCHLORIDE 4 MG: 4 TABLET ORAL at 09:37

## 2019-02-23 RX ADMIN — MORPHINE SULFATE 1 MG: 2 INJECTION, SOLUTION INTRAMUSCULAR; INTRAVENOUS at 00:55

## 2019-02-23 RX ADMIN — LISINOPRIL 40 MG: 20 TABLET ORAL at 08:36

## 2019-02-23 RX ADMIN — CLONIDINE HYDROCHLORIDE 0.1 MG: 0.1 TABLET ORAL at 08:36

## 2019-02-23 RX ADMIN — FOLIC ACID 1 MG: 1 TABLET ORAL at 08:36

## 2019-02-23 RX ADMIN — HYDROXYZINE HYDROCHLORIDE 25 MG: 25 TABLET, FILM COATED ORAL at 22:01

## 2019-02-23 RX ADMIN — GABAPENTIN 200 MG: 100 CAPSULE ORAL at 21:07

## 2019-02-23 RX ADMIN — PANCRELIPASE LIPASE, PANCRELIPASE PROTEASE, PANCRELIPASE AMYLASE 1 CAPSULE: 10000; 32000; 42000 CAPSULE, DELAYED RELEASE ORAL at 23:43

## 2019-02-23 RX ADMIN — PANCRELIPASE LIPASE, PANCRELIPASE PROTEASE, PANCRELIPASE AMYLASE 1 CAPSULE: 10000; 32000; 42000 CAPSULE, DELAYED RELEASE ORAL at 18:19

## 2019-02-23 RX ADMIN — HYDROMORPHONE HYDROCHLORIDE 4 MG: 4 TABLET ORAL at 14:09

## 2019-02-23 RX ADMIN — MORPHINE SULFATE 1 MG: 2 INJECTION, SOLUTION INTRAMUSCULAR; INTRAVENOUS at 23:43

## 2019-02-23 RX ADMIN — PANCRELIPASE LIPASE, PANCRELIPASE PROTEASE, PANCRELIPASE AMYLASE 1 CAPSULE: 10000; 32000; 42000 CAPSULE, DELAYED RELEASE ORAL at 12:08

## 2019-02-23 RX ADMIN — SODIUM CHLORIDE 2 G: 9 INJECTION INTRAMUSCULAR; INTRAVENOUS; SUBCUTANEOUS at 12:08

## 2019-02-23 RX ADMIN — AMLODIPINE BESYLATE 10 MG: 5 TABLET ORAL at 08:36

## 2019-02-23 RX ADMIN — MORPHINE SULFATE 1 MG: 2 INJECTION, SOLUTION INTRAMUSCULAR; INTRAVENOUS at 08:35

## 2019-02-23 NOTE — ROUTINE PROCESS
Bedside and Verbal shift change report given to SEGUNDO Mcknight RN (oncoming nurse) by F. Brunilda Opitz, RN (offgoing nurse). Report included the following information SBAR, Kardex, Intake/Output, MAR and Recent Results.

## 2019-02-23 NOTE — PROGRESS NOTES
Pt refused PT due to: 
[]  Nausea/vomiting 
[]  Eating 
[x]  Pain. Pt refused stating \"No way I'm getting out of bed today. They better wait until I feel like it. \" 
[]  Pt lethargic 
[]  Off Unit Will f/u later as schedule allows. Thank you. Valeen Severe

## 2019-02-23 NOTE — PROGRESS NOTES
Problem: Falls - Risk of 
Goal: *Absence of Falls Document Dawno Horn Fall Risk and appropriate interventions in the flowsheet. Outcome: Progressing Towards Goal 
Fall Risk Interventions: 
Mobility Interventions: Utilize walker, cane, or other assistive device, Patient to call before getting OOB Medication Interventions: Patient to call before getting OOB, Teach patient to arise slowly Elimination Interventions: Call light in reach, Urinal in reach, Patient to call for help with toileting needs

## 2019-02-23 NOTE — PROGRESS NOTES
0845 - Pain 9/10. PRN 1 mg morphine pain medication administered at this time. Patient has been educated on side effects. 4066- Patient in bed  at this time. A/O x 4. PICC to right AC  intact and patent. Kurlex dressing to left foot has small amount of breakthrough drainage. Patient denies numbness/tingling. Peda pulses palpable on right foot. Lungs clear but diminished in bases. Bowel sounds active to all quadrants. Pain 7/10.  
0850-Took verbal order with readback for PT/OT consult for patient. 3293 - Pain 8/10. PRN 4 mg dilaudid pain medication administered at this time. Patient has been educated on side effects. 1537-Described patient's pain to his foot and the burning, phantom like nerve pain he desribed. Dr. Kang Shea placed order for gabapentin to help treat nerve pain to foot after amputation.

## 2019-02-23 NOTE — PROGRESS NOTES
2132 -  Head to toe assessment performed at this time. Pt denies any chest pain or SOB. Pt denies any numbness or tingling to extremities. Pt encouraged to manage pain and to use the incentive spirometer. Pt educated on the side effects of medications taken. Pt left with call light within reach and bed in low position. Will continue to monitor. 0055 - Pt state pain as 8/10. Pt received medication per MAR and ice. Pt informed of the side effects of the medication and the next time medication can be given. Pt encouraged to call for assistance and to manage pain. Pt encouraged to use incentive spirometer. Pt left with call light within reach, bed in low position and wheels locked. Will continue to monitor. 1794 - Pt state pain as 8/10. Pt received medication per MAR and ice. Pt informed of the side effects of the medication and the next time medication can be given. Pt encouraged to call for assistance and to manage pain. Pt encouraged to use incentive spirometer. Pt left with call light within reach, bed in low position and wheels locked. Will continue to monitor. Shift summary - Pt pain managed with prn medication per MAR. Pt informed about all medications and side effects and encouraged to ask questions about medication. Pt encouraged throughout the night to use incentive spirometer and the purpose of the incentive spirometer. Pt left with no signs of distress and any concerns of pt addressed.

## 2019-02-23 NOTE — PROGRESS NOTES
Problem: Falls - Risk of 
Goal: *Absence of Falls Document E[place] Vietnamese Fall Risk and appropriate interventions in the flowsheet. Outcome: Progressing Towards Goal 
Fall Risk Interventions: 
Mobility Interventions: Patient to call before getting OOB, PT Consult for mobility concerns, PT Consult for assist device competence, Strengthening exercises (ROM-active/passive), Utilize walker, cane, or other assistive device Mentation Interventions: Adequate sleep, hydration, pain control Medication Interventions: Patient to call before getting OOB, Teach patient to arise slowly Elimination Interventions: Call light in reach, Elevated toilet seat, Patient to call for help with toileting needs, Toilet paper/wipes in reach, Toileting schedule/hourly rounds, Urinal in reach

## 2019-02-23 NOTE — PROGRESS NOTES
Hospitalist Progress Note Patient: Bucky Vicente MRN: [id number]  CSN: [id number] YOB: 1960  Age: 62 y.o. Sex: male DOA: 2/21/2019 LOS:  LOS: 2 days Chief Complaint:Foot pain Assessment/Plan Disposition : 
Patient Active Problem List  
Diagnosis Code  Pancreatitis K85.90  
 Dehydration E86.0  Abdominal pain R10.9  Gout attack M10.9  Cellulitis of left foot L03. [address] [facility] Hypertension I10  Smoker F17.200  Pancreatitis chronic  Gout M10.9  PAD (peripheral artery disease) ([facility]) I73.9  Alcohol use Z78.9  Hyponatremia E87.1  Cocaine abuse ([facility] F14.10  Alcohol withdrawal ([facility]) F10.239  Malnutrition ([facility] E46  
 Encephalopathy acute G93.40  Foot infection L08.9  Gangrene ([facility] I96  
 Cellulitis L03.90  
 
63 y/o male s/p TMA by Dr. Bob Gonzales. Multiple medical issues including copd, chronic pancreatitis and htn presnent. C/o post op pain. Gangrene foot s/p TMA. Chronic pancreatitis. COPD PVD HTN Smoker. Unclear etoh hx. Anemia acute blood loss / post op.  
 
-Add dilaudid oral tabs for post op pain. 
-Nebs prn for copd. -BP meds. -DVT px heparin. 
-Dispo - TBD. Pt consult. Subjective: 
 
 
C/o post op foot pain. No other complaints. Review of systems: 
 
Constitutional: denies fevers, chills, myalgias Respiratory: denies SOB, cough Cardiovascular: denies chest pain, palpitations Gastrointestinal: denies nausea, vomiting, diarrhea Vital signs/Intake and Output: 
Visit Vitals BP (!) 174/113 (BP 1 Location: Right arm, BP Patient Position: At rest) Pulse 86 Temp 98.1 °F (36.7 °C) Resp 16 Ht 5' 4\" (1.626 m) Wt 49 kg (108 lb) SpO2 100% BMI 18.54 kg/m² Current Shift:  02/23 0701 - 02/23 1900 In: 400 [P.O.:400] Out: 400 [Urine:400] Last three shifts:  02/21 1901 - 02/23 0700 In: 800 [P.O.:550; I.V.:250] Out: 1380 [JMCJX:2393] Exam: 
 
General: Well developed, alert, NAD, OX3 
 Head/Neck: NCAT, supple, No masses, No lymphadenopathy CVS:Regular rate and rhythm, no M/R/G, S1/S2 heard, no thrill Lungs:Clear to auscultation bilaterally, no wheezes, rhonchi, or rales Abdomen: Soft, Nontender, No distention, Normal Bowel sounds, No hepatomegaly Extremities: No edema Labs: Results:  
   
Chemistry Recent Labs  
  02/23/19 0142 02/21/19 2200 GLU 75 104*  135* K 4.1 3.9  100 CO2 26 25 BUN 13 12 CREA 1.21 1.44* CA 8.5 8.6 AGAP 7 10 BUCR 11* 8* AP  --  151* TP  --  8.1 ALB  --  3.3*  
GLOB  --  4.8* AGRAT  --  0.7* CBC w/Diff Recent Labs  
  02/23/19 0142 02/21/19 2200 WBC 8.5 13.4*  
RBC 3.59* 4.09* HGB 9.7* 11.3* HCT 32.2* 36.5 * 462* GRANS  --  74* LYMPH  --  14* EOS  --  6* Cardiac Enzymes No results for input(s): CPK, CKND1, DOUGLAS in the last 72 hours. No lab exists for component: Paola Macadamia Coagulation No results for input(s): PTP, INR, APTT in the last 72 hours. No lab exists for component: INREXT Lipid Panel Lab Results Component Value Date/Time Cholesterol, total 126 02/24/2014 09:52 AM  
 HDL Cholesterol 58 02/24/2014 09:52 AM  
 LDL, calculated 60.6 02/24/2014 09:52 AM  
 VLDL, calculated 7.4 02/24/2014 09:52 AM  
 Triglyceride 37 02/24/2014 09:52 AM  
 CHOL/HDL Ratio 2.2 02/24/2014 09:52 AM  
  
BNP No results for input(s): BNPP in the last 72 hours. Liver Enzymes Recent Labs  
  02/21/19 2200 TP 8.1 ALB 3.3* * SGOT 33 Thyroid Studies Lab Results Component Value Date/Time TSH 0.84 02/24/2014 09:52 AM  
    
Procedures/imaging: see electronic medical records for all procedures/Xrays and details which were not copied into this note but were reviewed prior to creation of Plan Ludivina Velez MD

## 2019-02-24 LAB
ANION GAP SERPL CALC-SCNC: 6 MMOL/L (ref 3–18)
BUN SERPL-MCNC: 9 MG/DL (ref 7–18)
BUN/CREAT SERPL: 8 (ref 12–20)
CALCIUM SERPL-MCNC: 9.1 MG/DL (ref 8.5–10.1)
CHLORIDE SERPL-SCNC: 102 MMOL/L (ref 100–108)
CO2 SERPL-SCNC: 30 MMOL/L (ref 21–32)
CREAT SERPL-MCNC: 1.17 MG/DL (ref 0.6–1.3)
GLUCOSE SERPL-MCNC: 151 MG/DL (ref 74–99)
POTASSIUM SERPL-SCNC: 4 MMOL/L (ref 3.5–5.5)
SODIUM SERPL-SCNC: 138 MMOL/L (ref 136–145)

## 2019-02-24 PROCEDURE — 80048 BASIC METABOLIC PNL TOTAL CA: CPT

## 2019-02-24 PROCEDURE — 3331090002 HH PPS REVENUE DEBIT

## 2019-02-24 PROCEDURE — 74011250637 HC RX REV CODE- 250/637: Performed by: INTERNAL MEDICINE

## 2019-02-24 PROCEDURE — 80202 ASSAY OF VANCOMYCIN: CPT

## 2019-02-24 PROCEDURE — 65270000029 HC RM PRIVATE

## 2019-02-24 PROCEDURE — 74011250636 HC RX REV CODE- 250/636: Performed by: INTERNAL MEDICINE

## 2019-02-24 PROCEDURE — 3331090001 HH PPS REVENUE CREDIT

## 2019-02-24 PROCEDURE — 74011250637 HC RX REV CODE- 250/637: Performed by: HOSPITALIST

## 2019-02-24 RX ORDER — GABAPENTIN 300 MG/1
300 CAPSULE ORAL 4 TIMES DAILY
Status: DISCONTINUED | OUTPATIENT
Start: 2019-02-24 | End: 2019-02-25

## 2019-02-24 RX ORDER — HYDRALAZINE HYDROCHLORIDE 25 MG/1
25 TABLET, FILM COATED ORAL 3 TIMES DAILY
Status: DISCONTINUED | OUTPATIENT
Start: 2019-02-24 | End: 2019-02-27 | Stop reason: HOSPADM

## 2019-02-24 RX ORDER — CLONIDINE HYDROCHLORIDE 0.1 MG/1
0.2 TABLET ORAL 2 TIMES DAILY
Status: DISCONTINUED | OUTPATIENT
Start: 2019-02-24 | End: 2019-02-27 | Stop reason: HOSPADM

## 2019-02-24 RX ADMIN — Medication 10 ML: at 21:54

## 2019-02-24 RX ADMIN — LISINOPRIL 40 MG: 20 TABLET ORAL at 08:25

## 2019-02-24 RX ADMIN — MORPHINE SULFATE 1 MG: 2 INJECTION, SOLUTION INTRAMUSCULAR; INTRAVENOUS at 19:52

## 2019-02-24 RX ADMIN — HYDROMORPHONE HYDROCHLORIDE 4 MG: 4 TABLET ORAL at 18:11

## 2019-02-24 RX ADMIN — MORPHINE SULFATE 1 MG: 2 INJECTION, SOLUTION INTRAMUSCULAR; INTRAVENOUS at 11:53

## 2019-02-24 RX ADMIN — GABAPENTIN 300 MG: 300 CAPSULE ORAL at 14:15

## 2019-02-24 RX ADMIN — HYDROMORPHONE HYDROCHLORIDE 4 MG: 4 TABLET ORAL at 14:15

## 2019-02-24 RX ADMIN — HEPARIN SODIUM 5000 UNITS: 5000 INJECTION INTRAVENOUS; SUBCUTANEOUS at 18:11

## 2019-02-24 RX ADMIN — CLONIDINE HYDROCHLORIDE 0.1 MG: 0.1 TABLET ORAL at 08:25

## 2019-02-24 RX ADMIN — HYDRALAZINE HYDROCHLORIDE 25 MG: 25 TABLET, FILM COATED ORAL at 10:24

## 2019-02-24 RX ADMIN — GABAPENTIN 300 MG: 300 CAPSULE ORAL at 21:49

## 2019-02-24 RX ADMIN — FOLIC ACID 1 MG: 1 TABLET ORAL at 08:26

## 2019-02-24 RX ADMIN — MORPHINE SULFATE 1 MG: 2 INJECTION, SOLUTION INTRAMUSCULAR; INTRAVENOUS at 15:14

## 2019-02-24 RX ADMIN — MORPHINE SULFATE 1 MG: 2 INJECTION, SOLUTION INTRAMUSCULAR; INTRAVENOUS at 08:25

## 2019-02-24 RX ADMIN — ATORVASTATIN CALCIUM 20 MG: 20 TABLET, FILM COATED ORAL at 08:25

## 2019-02-24 RX ADMIN — PANCRELIPASE LIPASE, PANCRELIPASE PROTEASE, PANCRELIPASE AMYLASE 1 CAPSULE: 10000; 32000; 42000 CAPSULE, DELAYED RELEASE ORAL at 08:31

## 2019-02-24 RX ADMIN — COLCHICINE 0.6 MG: 0.6 TABLET, FILM COATED ORAL at 08:25

## 2019-02-24 RX ADMIN — HEPARIN SODIUM 5000 UNITS: 5000 INJECTION INTRAVENOUS; SUBCUTANEOUS at 03:04

## 2019-02-24 RX ADMIN — HYDRALAZINE HYDROCHLORIDE 25 MG: 25 TABLET, FILM COATED ORAL at 21:49

## 2019-02-24 RX ADMIN — Medication 100 MG: at 08:25

## 2019-02-24 RX ADMIN — HYDROMORPHONE HYDROCHLORIDE 4 MG: 4 TABLET ORAL at 10:24

## 2019-02-24 RX ADMIN — GABAPENTIN 200 MG: 100 CAPSULE ORAL at 08:26

## 2019-02-24 RX ADMIN — PANCRELIPASE LIPASE, PANCRELIPASE PROTEASE, PANCRELIPASE AMYLASE 1 CAPSULE: 10000; 32000; 42000 CAPSULE, DELAYED RELEASE ORAL at 21:54

## 2019-02-24 RX ADMIN — GABAPENTIN 300 MG: 300 CAPSULE ORAL at 18:11

## 2019-02-24 RX ADMIN — HYDROMORPHONE HYDROCHLORIDE 4 MG: 4 TABLET ORAL at 05:09

## 2019-02-24 RX ADMIN — Medication 10 ML: at 14:15

## 2019-02-24 RX ADMIN — MORPHINE SULFATE 1 MG: 2 INJECTION, SOLUTION INTRAMUSCULAR; INTRAVENOUS at 03:16

## 2019-02-24 RX ADMIN — HYDROMORPHONE HYDROCHLORIDE 4 MG: 4 TABLET ORAL at 22:48

## 2019-02-24 RX ADMIN — SODIUM CHLORIDE 2 G: 9 INJECTION INTRAMUSCULAR; INTRAVENOUS; SUBCUTANEOUS at 14:14

## 2019-02-24 RX ADMIN — CLONIDINE HYDROCHLORIDE 0.2 MG: 0.1 TABLET ORAL at 21:49

## 2019-02-24 RX ADMIN — PANCRELIPASE LIPASE, PANCRELIPASE PROTEASE, PANCRELIPASE AMYLASE 1 CAPSULE: 10000; 32000; 42000 CAPSULE, DELAYED RELEASE ORAL at 16:51

## 2019-02-24 RX ADMIN — HYDROXYZINE HYDROCHLORIDE 25 MG: 25 TABLET, FILM COATED ORAL at 14:15

## 2019-02-24 RX ADMIN — AMLODIPINE BESYLATE 10 MG: 5 TABLET ORAL at 08:26

## 2019-02-24 RX ADMIN — FAMOTIDINE 20 MG: 20 TABLET ORAL at 08:25

## 2019-02-24 RX ADMIN — HYDRALAZINE HYDROCHLORIDE 25 MG: 25 TABLET, FILM COATED ORAL at 15:15

## 2019-02-24 RX ADMIN — HEPARIN SODIUM 5000 UNITS: 5000 INJECTION INTRAVENOUS; SUBCUTANEOUS at 10:24

## 2019-02-24 NOTE — ROUTINE PROCESS
Bedside and Verbal shift change report given to WILL Sutton RN (oncoming nurse) by KATELYN Hernandez RN (offgoing nurse). Report included the following information SBAR, OR Summary, Intake/Output, MAR and Recent Results.

## 2019-02-24 NOTE — CONSULTS
VASCULAR CONSULTATION NOTE    A vascular consultation has been requested on Glynn Lang, who is a 62 y.o. male admitted to the hospital on 2/21/2019 with an admitting diagnosis of Foot infection [L08.9]  Gangrene ([facility] [I96]  Cellulitis [L03.90]. He is being seen for evaluation and possible treatment of  PAD    Attending Physician: Dr. Raimundo Meeks    HPI:  61 yo male well known to me s/p left popliteal artery percutaneous thrombectomy and MISAEL pta 2 1/2 weeks ago for infected hardware and wound breakdown of the left great toe. He was seen in my office 2 d ago and subsequently admitted and underwent left TMA by Dr Haile Palmer for non-healing and gangrenous left great toe. Arterial duplex in our office did reveal a proximal popliteal artery stenosis and monophasic waveforms in the patient's foot. I have rereviewed his angio from 2/6/19 and unfortunately he has no distal tibial or pedal target for an open bypass. Intraoperative pictures taken by Dr Haile Palmer did reveal what appears to be good perfusion of the foot following TMA. HISTORY:    Patient Active Problem List   Diagnosis Code    Pancreatitis K85.90    Dehydration E86.0    Abdominal pain R10.9    Gout attack M10.9    Cellulitis of left foot L03. 80    Hypertension I10    Smoker F17.200    Pancreatitis chronic     Gout M10.9    PAD [facility]peripheral artery disease) ([facility]) I73.9    Alcohol use Z78.9    Hyponatremia E87.1    Cocaine abuse ([facility]) F14.10    Alcohol withdrawal ([facility]) F10.239    Malnutrition ([facility] E46    Encephalopathy acute G93.40    Foot infection L08.9    Gangrene ([facility]) I96    Cellulitis L03.90       Past Medical History:   Diagnosis Date    Gout     Hypertension     Pancreatitis chronic      Past Surgical History:   Procedure Laterality Date    HX CHOLECYSTECTOMY      VASCULAR SURGERY PROCEDURE UNLIST      vascular       History reviewed. No pertinent family history.       No Known Allergies    Home Medications: [unfilled]    In Patient Medications:   [unfilled]    Review of Systems:   Constitutional: negative for fevers, chills and sweats  Respiratory: negative for cough or hemoptysis  Cardiovascular: negative for chest pain  Gastrointestinal: negative for nausea and vomiting  Genitourinary:negative for frequency and dysuria  Musculoskeletal:negative for back pain  Neurological: negative for seizures    Physical Assessment:   Awake and alert  Cor RR  Lungs clear  Abd benign  Ext palpable femoral pulses bilaterally w/ patent left to right fem-fem bypass, palpable left popliteal pulse. Did not assess pedal pulses/signals of left foot secondary to dressing--pt/dp signals were present on exam in my office 2 days ago           Lab      CBC:   Lab Results   Component Value Date/Time    WBC 8.5 02/23/2019 01:42 AM    RBC 3.59 (L) 02/23/2019 01:42 AM     BMP:   Lab Results   Component Value Date/Time    CO2 26 02/23/2019 01:42 AM    BUN 13 02/23/2019 01:42 AM     Coagulation:   Lab Results   Component Value Date/Time    INR 0.9 02/04/2019 11:55 AM    aPTT 25.9 10/29/2018 07:56 AM       Xray       PVL       Impression:     1. Marginal perfusion to left foot w/o further option for revascularization  Plan: Will continue to follow with you and await initial dressing change by Dr Gurmeet Jimenez to further evaluate progress of wound healing of the left foot  I have discussed with the patient the possibility of a non-healing wound which would unfortunately necessitate a BKA.     [address] [facility]  SHARON Asst Prof Surgery  [phone]  [facility] [phone]

## 2019-02-24 NOTE — PROGRESS NOTES
Hospitalist Progress Note Patient: Myriam Brink MRN: [id number]  CSN: [id number] YOB: 1960  Age: 62 y.o. Sex: male DOA: 2/21/2019 LOS:  LOS: 3 days Chief Complaint: foot pain Assessment/Plan Disposition : 
Patient Active Problem List  
Diagnosis Code  Pancreatitis K85.90  
 Dehydration E86.0  Abdominal pain R10.9  Gout attack M10.9  Cellulitis of left foot L03. [address] [facility] Hypertension I10  Smoker F17.200  Pancreatitis chronic  Gout M10.9  PAD (peripheral artery disease) ([facility]) I73.9  Alcohol use Z78.9  Hyponatremia E87.1  Cocaine abuse ([facility] F14.10  Alcohol withdrawal ([facility]) F10.239  Mal[facility] E46  
 Encephalopathy acute G93.40  Foot infection L08.9  Gang[facility] ([facility] I96  
 Cellulitis L03.90  
 
 
61 y/o male s/p TMA by Dr. Dejah Briscoe. Multiple medical issues including copd, chronic pancreatitis and htn presnent. C/o post op pain. Taking po dilaudid. Added gabapentin. This seems to help.  
  
Gangrene foot s/p TMA. Chronic pancreatitis. COPD PVD HTN Smoker. Unclear etoh hx. Anemia acute blood loss / post op.  
  
-Increase gabapentin. 
-Nebs prn for copd. -BP meds. Add hydralazine. BP elevated on current therpy. -DVT px heparin. 
-Dispo - TBD. Pt consult. 
-Podiatry note reviewed. Subjective: 
 
Seen and examined. Some foot pain. Tells gabapentin helped. Pain has a neuropathic character. No cp or sob. Tolerating po. D/w Dr. Cha Abdi yesterday. Has perfusion but not pulsatile perfusion. Review of systems: 
 
Constitutional: denies fevers, chills, myalgias Respiratory: denies SOB, cough Cardiovascular: denies chest pain, palpitations Gastrointestinal: denies nausea, vomiting, diarrhea Vital signs/Intake and Output: 
Visit Vitals BP (!) 176/115 (BP 1 Location: Right arm, BP Patient Position: At rest) Pulse 97 Temp 98.2 °F (36.8 °C) Resp 18 Ht 5' 4\" (1.626 m) Wt 49 kg (107 lb 16 oz) SpO2 100% BMI 18.54 kg/m² Current Shift:  02/24 0701 - 02/24 1900 In: -  
Out: 394 [HBMGR:321] Last three shifts:  02/22 1901 - 02/24 0700 In: 1200 [P.O.:1200] Out: 2150 [Urine:2150] Exam: 
 
General: Well developed, alert, NAD, OX3 Head/Neck: NCAT, supple, No masses, No lymphadenopathy CVS:Regular rate and rhythm, no M/R/G, S1/S2 heard, no thrill Lungs:Clear to auscultation bilaterally, no wheezes, rhonchi, or rales Abdomen: Soft, Nontender, No distention, Normal Bowel sounds, No hepatomegaly Labs: Results:  
   
Chemistry Recent Labs  
  02/24/19 
0400 02/23/19 
0142 02/21/19 2200 * 75 104*  139 135* K 4.0 4.1 3.9  106 100 CO2 30 26 25 BUN 9 13 12 CREA 1.17 1.21 1.44* CA 9.1 8.5 8.6 AGAP 6 7 10 BUCR 8* 11* 8* AP  --   --  151* TP  --   --  8.1 ALB  --   --  3.3*  
GLOB  --   --  4.8* AGRAT  --   --  0.7* CBC w/Diff Recent Labs  
  02/23/19 
0142 02/21/19 2200 WBC 8.5 13.4*  
RBC 3.59* 4.09* HGB 9.7* 11.3* HCT 32.2* 36.5 * 462* GRANS  --  74* LYMPH  --  14* EOS  --  6* Cardiac Enzymes No results for input(s): CPK, CKND1, DOUGLAS in the last 72 hours. No lab exists for component: Lavada Bates Coagulation No results for input(s): PTP, INR, APTT in the last 72 hours. No lab exists for component: INREXT Lipid Panel Lab Results Component Value Date/Time Cholesterol, total 126 02/24/2014 09:52 AM  
 HDL Cholesterol 58 02/24/2014 09:52 AM  
 LDL, calculated 60.6 02/24/2014 09:52 AM  
 VLDL, calculated 7.4 02/24/2014 09:52 AM  
 Triglyceride 37 02/24/2014 09:52 AM  
 CHOL/HDL Ratio 2.2 02/24/2014 09:52 AM  
  
BNP No results for input(s): BNPP in the last 72 hours. Liver Enzymes Recent Labs  
  02/21/19 
2200 TP 8.1 ALB 3.3* * SGOT 33 Thyroid Studies Lab Results Component Value Date/Time  TSH 0.84 02/24/2014 09:52 AM  
    
 Procedures/imaging: see electronic medical records for all procedures/Xrays and details which were not copied into this note but were reviewed prior to creation of Plan [place] MD

## 2019-02-24 NOTE — PROGRESS NOTES
Progress Note Patient: Tawana Morales Said               Sex: male          DOA: 2/21/2019 YOB: 1960      Age:  62 y.o.        LOS:  LOS: 3 days Subjective:  
Pt seen for fu of TMA and rotational flap left foot. Pain is improved since last his surgery. Objective:  
  
Visit Vitals BP (!) 190/101 (BP 1 Location: Right arm, BP Patient Position: At rest) Pulse 83 Temp 97.8 °F (36.6 °C) Resp 20 Ht 5' 4\" (1.626 m) Wt 49 kg (107 lb 16 oz) SpO2 100% BMI 18.54 kg/m² Physical Exam: 
Incision is intact. Good perfusion. Flap is warm and no sign of infection Intake and Output: 
Current Shift:  No intake/output data recorded. Last three shifts:  02/22 1901 - 02/24 0700 In: 1200 [P.O.:1200] Out: 2150 [Urine:2150] Lab/Data Reviewed: All lab results for the last 24 hours reviewed. All lab results for the last 24 hours reviewed. Medications Reviewed Assessment/Plan Principal Problem: Foot infection (2/21/2019) Active Problems: 
  Cellulitis of left foot (2/4/2019) Alcohol use (2/4/2019) Malnutrition ([address] (2/8/2019) Gangrene ([address] (2/22/2019) Cellulitis (2/22/2019) TMA flap appears warm and has moderately good perfusion. I am hopeful this will heal. Would recommend dc home tomorrow and I can follow as an outpatient. Will need [facility] for daily dressing changes. Guillermo Up DPM 
February 24, 2019

## 2019-02-24 NOTE — PROGRESS NOTES
Pt refused PT due to: 
[]  Nausea/vomiting 
[]  Eating 
[x]  Pain, pt stating \"I'm not doing anything today Mr. Nicholas Alanis. \" Checked with nrs, she states pt is NWB on affected limb 
[]  Pt lethargic 
[]  Off Unit Will f/u tomorrow as schedule allows. Thank you. Fatoumata Simms

## 2019-02-24 NOTE — ROUTINE PROCESS
1910 - Bedside and Verbal shift change report given to jacque Black RN by Caroll Landau, RN. Report included the following information SBAR, Kardex, OR Summary, Intake/Output and MAR.

## 2019-02-24 NOTE — PROGRESS NOTES
Problem: Falls - Risk of 
Goal: *Absence of Falls Document Keya Primes Fall Risk and appropriate interventions in the flowsheet. Outcome: Progressing Towards Goal 
Fall Risk Interventions: 
Mobility Interventions: Patient to call before getting OOB, PT Consult for mobility concerns, PT Consult for assist device competence, Utilize walker, cane, or other assistive device Mentation Interventions: Adequate sleep, hydration, pain control Medication Interventions: Patient to call before getting OOB, Teach patient to arise slowly Elimination Interventions: Call light in reach, Patient to call for help with toileting needs, Toileting schedule/hourly rounds, Urinal in reach

## 2019-02-24 NOTE — PROGRESS NOTES
Pt refused PT due to: 
[]  Nausea/vomiting 
[]  Eating 
[x]  Pain, pt stating is the same as yesterday. \"We ain't doing any walking today Mr. Whitman. You can keep on coming by though\" []  Pt lethargic 
[]  Off Unit Will f/u later as schedule allows. Thank you. Goyo Castillo

## 2019-02-24 NOTE — PROGRESS NOTES
1925 - Assumed care of patient at this time. Patient is alert and oriented x 4. Patient denies chest pain, shortness of breath, or numbness or tingling in the upper or lower extremities. Kerlix dressing on the left foot is dry and intact, with old drainage present. Double lumen PICC line in the left basilic is clean, dry and intact and infusing NS @ 10mL/hr through the purple port. The red report is capped. Patient currently experiencing 7/10 pain. Bed is in lowest position with the wheels locked. Siderails x 3 are up. Call bell within reach. Patient is currently resting in bed in no apparent distress. Will continue to monitor. 2110 - Head to toe assessment performed at this time. Patient has no complaints of chest pain or shortness of breath. Denies any numbness or tingling in extremities. Lungs are clear to auscultation, but dimished in the bases. Bowel sounds are present in all 4 quadrants. Double lumen PICC line in the left basilic shows no signs of phlebitis or infiltration. Patient educated how to actively manage their pain. Patient encouraged to use the incentive spirometer. Patient educated on the side effects of medications. Instructed the patient not to attempt to get out of bed and/or ambulate without a staff member present. Patient verbalized understanding. Patient left in bed with call light within reach, bed in lowest position with wheels locked, and siderails x 3 up. Will continue to monitor. 2153 - Patient stated pain to be 8/10. Patient received oral PRN medication per the [facility] Patient educated on the side effects of the medications. Patient encouraged to continue to actively manage pain and call for assistance. Will continue to monitor. 2200 - Patient continues to have severe, unrelieved pain and elevated blood pressure. Patient is showing signs of distress, restlessness and anxiety. PRN hydroxyzine HCl administered per the [facility] Will continue to monitor. 2315 - Patient continues to have unrelieved pain in the left foot. Paged and spoke to Dr. Erma Chisholm concerning the patient's unrelieved pain and elevated blood pressures. Dr. Hemant Chavez gave a telephone order with read back for 1mg of morphine every 3 hours PRN for severe pain. Order placed at this time. 2345 - Patient stated pain to be 8/10. Patient received IV PRN medication per the [facility] Patient educated on the side effects of the medications. Patient encouraged to continue to actively manage pain and call for assistance. Will continue to monitor. 65 - Patient laying in bed with eyes closed, breathing regularly and evenly. Call bell within reach. Will continue to monitor. 4379 - Patient laying in bed with eyes closed, breathing regularly and evenly. Call bell within reach. Will continue to monitor. 0787 - Patient stated pain to be 8/10. Patient received IV PRN medication per the [facility] Patient educated on the side effects of the medications. Patient encouraged to continue to actively manage pain and call for assistance. Will continue to monitor. 0400 - Labs drawn from purple port on the PICC line. Both purple and red ports flushed with positive blood return. End caps changed on both ports and new alcohol caps placed. Will continue to monitor. 5 - Patient stated pain to be 8/10. Patient received oral PRN medication per the [facility] Patient educated on the side effects of the medications. Patient encouraged to continue to actively manage pain and call for assistance. Will continue to monitor.

## 2019-02-24 NOTE — PROGRESS NOTES
1500- Assume care of patient at this time. Report received from Yuri SOLIS RN. Patient in bed. Pain 8/10. Medication given by off going nurse. 1530- Pt remains in bed, A&O x4, denies CP, SOB. Patient continues to complain of pain to left foot. Left foot has Kerlix dressing, CDI, changed by MD today. Skin otherwise intact but dry. PICC line to left arm, infusing without difficulty. Call bell within reach. 1814- Pain 9/10. PRN Dilaudid 4 mg PO pain medication administered at this time. Patient has been educated on side effects. Side effect education sheets have been provided. 1854- Pain 6/10. PRN Morphine 1 mg IV pain medication administered at this time. Patient has been educated on side effects. Side effect education sheets have been provided.

## 2019-02-25 ENCOUNTER — HOSPITAL ENCOUNTER (OUTPATIENT)
Dept: INFUSION THERAPY | Age: 59
Discharge: HOME | End: 2019-02-25
Payer: MEDICARE

## 2019-02-25 LAB
ANION GAP SERPL CALC-SCNC: 5 MMOL/L (ref 3–18)
BUN SERPL-MCNC: 12 MG/DL (ref 7–18)
BUN/CREAT SERPL: 11 (ref 12–20)
CALCIUM SERPL-MCNC: 8.7 MG/DL (ref 8.5–10.1)
CHLORIDE SERPL-SCNC: 104 MMOL/L (ref 100–108)
CO2 SERPL-SCNC: 29 MMOL/L (ref 21–32)
CREAT SERPL-MCNC: 1.05 MG/DL (ref 0.6–1.3)
DATE LAST DOSE: NORMAL
ERYTHROCYTE [DISTWIDTH] IN BLOOD BY AUTOMATED COUNT: 12.3 % (ref 11.6–14.5)
GLUCOSE SERPL-MCNC: 101 MG/DL (ref 74–99)
HCT VFR BLD AUTO: 32.6 % (ref 36–48)
HGB BLD-MCNC: 10 G/DL (ref 13–16)
MCH RBC QN AUTO: 27.2 PG (ref 24–34)
MCHC RBC AUTO-ENTMCNC: 30.7 G/DL (ref 31–37)
MCV RBC AUTO: 88.6 FL (ref 74–97)
PLATELET # BLD AUTO: 443 K/UL (ref 135–420)
PMV BLD AUTO: 9.3 FL (ref 9.2–11.8)
POTASSIUM SERPL-SCNC: 4.2 MMOL/L (ref 3.5–5.5)
RBC # BLD AUTO: 3.68 M/UL (ref 4.7–5.5)
REPORTED DOSE,DOSE: NORMAL UNITS
REPORTED DOSE/TIME,TMG: 2343
SODIUM SERPL-SCNC: 138 MMOL/L (ref 136–145)
VANCOMYCIN TROUGH SERPL-MCNC: 10.5 UG/ML (ref 10–20)
WBC # BLD AUTO: 10.4 K/UL (ref 4.6–13.2)

## 2019-02-25 PROCEDURE — 65270000029 HC RM PRIVATE

## 2019-02-25 PROCEDURE — 97535 SELF CARE MNGMENT TRAINING: CPT

## 2019-02-25 PROCEDURE — 36592 COLLECT BLOOD FROM PICC: CPT

## 2019-02-25 PROCEDURE — 97161 PT EVAL LOW COMPLEX 20 MIN: CPT

## 2019-02-25 PROCEDURE — 74011250636 HC RX REV CODE- 250/636: Performed by: NURSE PRACTITIONER

## 2019-02-25 PROCEDURE — 3331090002 HH PPS REVENUE DEBIT

## 2019-02-25 PROCEDURE — 74011250637 HC RX REV CODE- 250/637: Performed by: INTERNAL MEDICINE

## 2019-02-25 PROCEDURE — 74011250636 HC RX REV CODE- 250/636: Performed by: INTERNAL MEDICINE

## 2019-02-25 PROCEDURE — 80048 BASIC METABOLIC PNL TOTAL CA: CPT

## 2019-02-25 PROCEDURE — 3331090001 HH PPS REVENUE CREDIT

## 2019-02-25 PROCEDURE — 97167 OT EVAL HIGH COMPLEX 60 MIN: CPT

## 2019-02-25 PROCEDURE — 74011250636 HC RX REV CODE- 250/636: Performed by: HOSPITALIST

## 2019-02-25 PROCEDURE — 74011250637 HC RX REV CODE- 250/637: Performed by: HOSPITALIST

## 2019-02-25 PROCEDURE — 85027 COMPLETE CBC AUTOMATED: CPT

## 2019-02-25 RX ORDER — GABAPENTIN 400 MG/1
400 CAPSULE ORAL 4 TIMES DAILY
Status: DISCONTINUED | OUTPATIENT
Start: 2019-02-25 | End: 2019-02-27 | Stop reason: HOSPADM

## 2019-02-25 RX ORDER — MORPHINE SULFATE 2 MG/ML
2 INJECTION, SOLUTION INTRAMUSCULAR; INTRAVENOUS
Status: DISCONTINUED | OUTPATIENT
Start: 2019-02-25 | End: 2019-02-27 | Stop reason: HOSPADM

## 2019-02-25 RX ORDER — OXYCODONE AND ACETAMINOPHEN 5; 325 MG/1; MG/1
2 TABLET ORAL
Status: DISCONTINUED | OUTPATIENT
Start: 2019-02-25 | End: 2019-02-27 | Stop reason: HOSPADM

## 2019-02-25 RX ADMIN — HYDRALAZINE HYDROCHLORIDE 25 MG: 25 TABLET, FILM COATED ORAL at 09:09

## 2019-02-25 RX ADMIN — HEPARIN SODIUM 5000 UNITS: 5000 INJECTION INTRAVENOUS; SUBCUTANEOUS at 18:59

## 2019-02-25 RX ADMIN — HYDRALAZINE HYDROCHLORIDE 25 MG: 25 TABLET, FILM COATED ORAL at 15:16

## 2019-02-25 RX ADMIN — HEPARIN SODIUM 5000 UNITS: 5000 INJECTION INTRAVENOUS; SUBCUTANEOUS at 04:49

## 2019-02-25 RX ADMIN — Medication 10 ML: at 23:02

## 2019-02-25 RX ADMIN — HYDROMORPHONE HYDROCHLORIDE 4 MG: 4 TABLET ORAL at 09:09

## 2019-02-25 RX ADMIN — CLONIDINE HYDROCHLORIDE 0.2 MG: 0.1 TABLET ORAL at 09:09

## 2019-02-25 RX ADMIN — Medication 10 ML: at 19:13

## 2019-02-25 RX ADMIN — Medication 100 MG: at 09:10

## 2019-02-25 RX ADMIN — LISINOPRIL 40 MG: 20 TABLET ORAL at 09:09

## 2019-02-25 RX ADMIN — OXYCODONE AND ACETAMINOPHEN 2 TABLET: 5; 325 TABLET ORAL at 21:30

## 2019-02-25 RX ADMIN — VANCOMYCIN HYDROCHLORIDE 1000 MG: 10 INJECTION, POWDER, LYOPHILIZED, FOR SOLUTION INTRAVENOUS at 01:26

## 2019-02-25 RX ADMIN — OXYCODONE AND ACETAMINOPHEN 2 TABLET: 5; 325 TABLET ORAL at 15:19

## 2019-02-25 RX ADMIN — FAMOTIDINE 20 MG: 20 TABLET ORAL at 09:09

## 2019-02-25 RX ADMIN — COLCHICINE 0.6 MG: 0.6 TABLET, FILM COATED ORAL at 09:09

## 2019-02-25 RX ADMIN — GABAPENTIN 400 MG: 400 CAPSULE ORAL at 17:57

## 2019-02-25 RX ADMIN — Medication 10 ML: at 05:00

## 2019-02-25 RX ADMIN — HEPARIN SODIUM 5000 UNITS: 5000 INJECTION INTRAVENOUS; SUBCUTANEOUS at 10:36

## 2019-02-25 RX ADMIN — FOLIC ACID 1 MG: 1 TABLET ORAL at 09:10

## 2019-02-25 RX ADMIN — GABAPENTIN 300 MG: 300 CAPSULE ORAL at 09:09

## 2019-02-25 RX ADMIN — GABAPENTIN 400 MG: 400 CAPSULE ORAL at 21:23

## 2019-02-25 RX ADMIN — SODIUM CHLORIDE 2 G: 9 INJECTION INTRAMUSCULAR; INTRAVENOUS; SUBCUTANEOUS at 15:10

## 2019-02-25 RX ADMIN — SODIUM CHLORIDE 100 ML/HR: 900 INJECTION, SOLUTION INTRAVENOUS at 05:00

## 2019-02-25 RX ADMIN — CLONIDINE HYDROCHLORIDE 0.2 MG: 0.1 TABLET ORAL at 21:23

## 2019-02-25 RX ADMIN — PANCRELIPASE LIPASE, PANCRELIPASE PROTEASE, PANCRELIPASE AMYLASE 1 CAPSULE: 10000; 32000; 42000 CAPSULE, DELAYED RELEASE ORAL at 09:19

## 2019-02-25 RX ADMIN — ATORVASTATIN CALCIUM 20 MG: 20 TABLET, FILM COATED ORAL at 09:09

## 2019-02-25 RX ADMIN — MORPHINE SULFATE 1 MG: 2 INJECTION, SOLUTION INTRAMUSCULAR; INTRAVENOUS at 01:33

## 2019-02-25 RX ADMIN — MORPHINE SULFATE 1 MG: 2 INJECTION, SOLUTION INTRAMUSCULAR; INTRAVENOUS at 08:03

## 2019-02-25 RX ADMIN — AMLODIPINE BESYLATE 10 MG: 5 TABLET ORAL at 09:10

## 2019-02-25 RX ADMIN — PANCRELIPASE LIPASE, PANCRELIPASE PROTEASE, PANCRELIPASE AMYLASE 1 CAPSULE: 10000; 32000; 42000 CAPSULE, DELAYED RELEASE ORAL at 22:53

## 2019-02-25 RX ADMIN — HYDRALAZINE HYDROCHLORIDE 25 MG: 25 TABLET, FILM COATED ORAL at 21:23

## 2019-02-25 RX ADMIN — GABAPENTIN 400 MG: 400 CAPSULE ORAL at 15:11

## 2019-02-25 RX ADMIN — HYDROMORPHONE HYDROCHLORIDE 4 MG: 4 TABLET ORAL at 04:48

## 2019-02-25 RX ADMIN — PANCRELIPASE LIPASE, PANCRELIPASE PROTEASE, PANCRELIPASE AMYLASE 1 CAPSULE: 10000; 32000; 42000 CAPSULE, DELAYED RELEASE ORAL at 17:59

## 2019-02-25 NOTE — PROGRESS NOTES
9:15 AM Ptis awake, alert, oriented x 4. Sitting in bed and eating breakfast.  Lungs are clear. Abdomen soft with active BS. Pt complaining of severe pain. Pain level 9/10 . Medicated with Dilaudid 4 mg po. Kerlix dressing to right foot dry and intact. Dr Diego Miller in to see pt.  
11:47 AM IDR done with Dr Diego Miller. Pt was seen by PT. Pt ambulated with Pt to outside of room to  Room 229  then back in bed. Pt is NWB on left foot. Pain level 7/10. Pt has eczematous rashes to skin. 3:24 PM Pt woke up , oriented x 4. Complaining of pain with pain level 9/10. Medicated with Percocet 5/325 mg 2 tabs po. Pt sat at edge of bed  to eat lunch.  
1625 Pt is sleeping. 
6:02 PM 
 Pt is awake. Pain level 7/10 but more comfortable. 7:00 PM Pt was asleep. Pt now awake. Encouraged to eat dinner.

## 2019-02-25 NOTE — PROGRESS NOTES
D/C Plan: SNF  
 
CM met with pt at bedside to discuss transition of care. Pt would like SNF placement. CM provided a list of area facilities to refer to and offered [address]. Pt has indiated he has been to [employer] at [facility] in the past and would like this to be his first choice. Pt has also given permission to have clinical information sent to all facilities in [address] [place] [address]. CMS has been notified to assist.  CM to continue to follow and await acceptance. Please noted pt will have to have an insurance auth for placement. CM to continue to follow and assist. 
 
Care Management Interventions PCP Verified by CM: Yes Mode of Transport at Discharge: Other (see comment)(Family) Transition of Care Consult (CM Consult):  [address]: Yes Health Maintenance Reviewed: Yes Physical Therapy Consult: Yes Occupational Therapy Consult: Yes Current Support Network: Lives with Spouse, Family Lives [place] Confirm Follow Up Transport: Family Discharge Location Discharge Placement: Skilled nursing facility

## 2019-02-25 NOTE — PROGRESS NOTES
Hospitalist Progress Note Patient: Amy Andrade MRN: [id number]  CSN: [id number] YOB: 1960  Age: 62 y.o. Sex: male DOA: 2/21/2019 LOS:  LOS: 4 days Chief Complaint: 
 
Gangrene of foot Assessment/Plan  
63 y/o male s/p TMA by Dr. Chhaya Casarez for gangrene of the foot Was previosuly completing IV rocephin until 3/20 with a picc line for OM of toe 
  
Gangrene foot s/p TMA. Cleared by surgery for d/c-pain still a large issue-changed to percocet, increased neurontin dosing Chronic pancreatitis. No acute issues COPD-appears stable w/o wheezing PVD-seen by vasc surg, no new recs for now-s/p recent surgery by vascular HTN-controlled Smoker. Must quit to assure he can heal and of course reduce his other comorbidities that will inevitably develop and/or worsen Hx etoh use-no w/d sx seen Anemia acute blood loss / post op. Stable Severe prot lanette malnutrition He requests to go to a SNF Arrangements to be made Complete rocephin course as high risk for infection considering gangrene and PVD-until March 20th-picc to be removed thereafter Pain control Disposition :SNF once arrangements made Patient Active Problem List  
Diagnosis Code  Pancreatitis K85.90  
 Dehydration E86.0  Abdominal pain R10.9  Gout attack M10.9  Cellulitis of left foot L03. [facility] Hypertension I10  Smoker F17.200  Pancreatitis chronic  Gout M10.9  PAD (peripheral artery disease) ([facility]) I73.9  Alcohol use Z78.9  Hyponatremia E87.1  Cocaine abuse ([facility] F14.10  Alcohol withdrawal ([facility]) F10.239  Malnutrition ([facility] E46  
 Encephalopathy acute G93.40  Foot infection L08.9  Gangrene ([facility] I96  
 Cellulitis L03.90 Subjective: A lot of burning pain and spasms in Left leg per patient Feels he cannot go home, would like to go to rehab, he has been before Review of systems: 
 
Constitutional: denies fevers, chills Respiratory: denies SOB Cardiovascular: denies chest pain, palpitations Gastrointestinal: denies nausea, vomiting, diarrhea Vital signs/Intake and Output: 
Visit Vitals /78 (BP 1 Location: Right arm, BP Patient Position: At rest) Pulse 75 Temp 98.5 °F (36.9 °C) Resp 20 Ht 5' 4\" (1.626 m) Wt 49 kg (107 lb 16 oz) SpO2 95% BMI 18.54 kg/m² Current Shift:  No intake/output data recorded. Last three shifts:  02/23 1901 - 02/25 0700 In: 1500 [P.O.:500; I.V.:1000] Out: 1475 [[id number]] Exam: 
 
General: Well developed, alert, NAD, OX3 
CVS:Regular rate and rhythm, no M/R/G, S1/S2 heard, no thrill Lungs:Clear to auscultation bilaterally, no wheezes, rhonchi, or rales Abdomen: Soft, Nontender, No distention, Normal Bowel sounds, No hepatomegaly Extremities: LLE TMA wrapped, no edema of lower leg Neuro:grossly normal , follows commands Psych:appropriate Labs: Results:  
   
Chemistry Recent Labs  
  02/25/19 
0505 02/24/19 
0400 02/23/19 
0142 * 151* 75  138 139  
K 4.2 4.0 4.1  102 106 CO2 29 30 26 BUN 12 9 13 CREA 1.05 1.17 1.21  
CA 8.7 9.1 8.5 AGAP 5 6 7 BUCR 11* 8* 11* CBC w/Diff Recent Labs  
  02/25/19 
0505 02/23/19 
0142 WBC 10.4 8.5  
RBC 3.68* 3.59* HGB 10.0* 9.7* HCT 32.6* 32.2*  
* 425* Cardiac Enzymes No results for input(s): CPK, CKND1, DOUGLAS in the last 72 hours. No lab exists for component: Branda Cork Coagulation No results for input(s): PTP, INR, APTT in the last 72 hours. No lab exists for component: INREXT Lipid Panel Lab Results Component Value Date/Time Cholesterol, total 126 02/24/2014 09:52 AM  
 HDL Cholesterol 58 02/24/2014 09:52 AM  
 LDL, calculated 60.6 02/24/2014 09:52 AM  
 VLDL, calculated 7.4 02/24/2014 09:52 AM  
 Triglyceride 37 02/24/2014 09:52 AM  
 CHOL/HDL Ratio 2.2 02/24/2014 09:52 AM  
  
BNP No results for input(s): BNPP in the last 72 hours. Liver Enzymes No results for input(s): TP, ALB, TBIL, AP, SGOT, GPT in the last 72 hours. No lab exists for component: DBIL Thyroid Studies Lab Results Component Value Date/Time TSH 0.84 02/24/2014 09:52 AM  
    
Procedures/imaging: see electronic medical records for all procedures/Xrays and details which were not copied into this note but were reviewed prior to creation of Plan Abdifatah Coburn MD

## 2019-02-25 NOTE — PROGRESS NOTES
Vancomycin trough level = 10.5 @ 2230 02/24/2019  new vancomycin dosing:  
Estimated Pharmacokinetic Parameters (based on population kinetics) Vd: 35 L (0.7 L/kg) Cruzito: 0.038 hr-1 (T1/2 = 18.2 hrs) Dosing Recommendations Vancomycin dose: 1000 mg IV Q24hrs (infused over 1 hr) Estimated peak: 46.9 mcg/mL Estimated trough: 19.6 mcg/mL Estimated AUC:MARY: 752 mcg*hr/mL (assumed MARY 1 mcg/mL) A/P:  
1. Recommend vancomycin 1000 mg IV Q24hrs (20 mg/kg) to start 0100 02/25/2019 2. Consider a vancomycin trough level prior to the 4th dose. 3. Please monitor renal function (urine output, BUN/SCr). Dose adjustments may be necessary with a significant change in renal function.

## 2019-02-25 NOTE — CDMP QUERY
A diagnosis of Malnutrition is documented in the progress notes. For further specificity, please indicate the degree of malnutrition: 
 
=>Severe protein calorie malnutrition with severe muscle and fat depletion 
=>Moderate protein calorie malnutrition with severe muscle and fat depletion =>Other Explanation of clinical findings =>Unable to Determine (no explanation of clinical findings) The medical record reflects the following: 
 
Risk: alcohol use, chronic pancreatitis, smoker, low BMI Clinical Indicators: Patient presented w/ non-healing gangrenous L foot. Noted to have low BMI 18.54 Nutrition assessment was completed by RDN and the patient was found to meet the following malnutrition criteria established by ASPEN/AND: 
Adult Malnutrition Guidelines: SEVERE PROTEIN CALORIE MALNUTRITION IN THE CONTEXT OF CHRONIC ILLNESS Body Fat:  Severe Depletion Muscle Mass: Severe Depletion NFPE shows temporal wasting and clavicle showing Treatment:  Nutrition consult, Ensure Enlive TID, monitor I&O, daily weight and labs Please clarify and document your clinical opinion in the progress notes and discharge summary including the definitive and/or presumptive diagnosis, (suspected or probable), related to the above clinical findings. Please include clinical findings supporting your diagnosis. If you DECLINE this query or would like to communicate with [facility], please utilize the \"[facility] message box\" at the TOP of the Progress Note on the right. Thank you, Yue Hernandez RN, BSN, [address] 
[facility] [phone]

## 2019-02-25 NOTE — PROGRESS NOTES
Problem: Mobility Impaired (Adult and Pediatric) Goal: *Acute Goals and Plan of Care (Insert Text) Physical Therapy Goals Initiated 2/25/2019 and to be accomplished within 4 day(s) 1. Patient will move from supine <> sit with mod I/I in prep for out of bed activity and change of position. 2.  Patient will perform sit<> stand with S/mod I with LRAD in prep for transfers/ambulation. 3.  Patient will transfer from bed <> chair with S/mod I with LRAD/NWB L LE for time up in chair for completion of ADL activity. 4.  Patient will ambulate 100 feet with S/mod I with LRAD/NWB LLE for improved functional mobility/safe discharge. 5.  Patient will ascend/descend 3-5 stairs with handrail(s) with minimal assistance/contact guard assist NWB LLE for home re-entry as needed. Outcome: Progressing Towards Goal 
physical Therapy EVALUATION Patient: Israel Norwood (62 y.o. male) Date: 2/25/2019 Primary Diagnosis: Foot infection [L08.9] Gangrene ([address] [facility] Cellulitis [L03.90] Procedure(s) (LRB): 
PARTIAL AMPUTATION OF LEFT FOOT WITH  TRANSMETATARSAL AMPUTATION, SOFT TISSUE REARRANGEMENT GREATER THAN 20CM (Left) 3 Days Post-Op Precautions:Fall, NWB(L LE) ASSESSMENT : 
Based on the objective data described below, the patient is a 63 yo M seen on medical unit, found reclined in bed with L LE elevated. Pt reports pain 8/10 and described as burning/stinging/throbbing L foot. L foot with dressing c/d/i in place. Pt presents with decreased ROM/motor performance L LE s/p surgery and decreased independence in functional mobility, primarily related to transfers/gait. Pt demonstrates transfers with CGA/VC for safety and technique. Able to participate in GT/RW/NWB L LE 48ft with CGA/vc for safety/technique. Pt returned to bed and left supine with LLE elevated and in NAD with pain 7/10. All needs in reach, and nurse Santos Madison present and made aware of above.   Recommend HHPT vs SNF for follow up physical therapy upon discharge, pending progress, to reach maximal level of independence/safety with functional mobility. Pt Education: pt educated in safety/technique during functional mobility tasks Patient will benefit from skilled intervention to address the above impairments. Patients rehabilitation potential is considered to be Good Factors which may influence rehabilitation potential include:  
[]         None noted 
[]         Mental ability/status [x]         Medical condition 
[]         Home/family situation and support systems 
[]         Safety awareness 
[]         Pain tolerance/management 
[]         Other: PLAN : 
Recommendations and Planned Interventions: 
[x]           Bed Mobility Training             []    Neuromuscular Re-Education 
[x]           Transfer Training                   []    Orthotic/Prosthetic Training 
[x]           Gait Training                          []    Modalities [x]           Therapeutic Exercises          []    Edema Management/Control 
[x]           Therapeutic Activities            [x]    Patient and Family Training/Education 
[]           Other (comment): Frequency/Duration: Patient will be followed by physical therapy 1-2 times per day to address goals. Discharge Recommendations: Home Health and [place] Further Equipment Recommendations for Discharge: wheelchair for outside home/long distances SUBJECTIVE:  
Patient stated It burns.  OBJECTIVE DATA SUMMARY:  
 
Past Medical History:  
Diagnosis Date  Gout  Hypertension  Pancreatitis chronic Past Surgical History:  
Procedure Laterality Date  HX CHOLECYSTECTOMY  VASCULAR SURGERY PROCEDURE UNLIST    
 vascular Barriers to Learning/Limitations: yes;  physical 
Compensate with: Visual Cues, Verbal Cues and Other NWB L LE 
Prior Level of Function/Home Situation: lives with spouse, amb with RW or SPC Home Situation Home Environment: Apartment # Steps to Enter: 4(steps up then down) Rails to Enter: Yes Hand Rails : Bilateral 
One/Two Story Residence: One story Living Alone: No 
Support Systems: Spouse/Significant Other/Partner Patient Expects to be Discharged to[de-identified] [id number] Current DME Used/Available at Home: [facility] [place], straightCritical Behavior: 
Neurologic State: Alert; Appropriate for age Orientation Level: Oriented X4 Cognition: Follows commands; Appropriate decision making; Appropriate for age attention/concentration; Appropriate safety awareness Safety/Judgement: Awareness of environment; Fall prevention Psychosocial 
Patient Behaviors: Calm; Cooperative Purposeful Interaction: Yes Pt Identified Daily Priority: Clinical issues (comment) Caritas Process: Nurture loving kindness Caring Interventions: Reassure Reassure: Informing Therapeutic Modalities: HumorSkin Condition/Temp: Dry;Warm 
Skin Integrity: Incision (comment)(dressing with Kerlix in place L foot c/d/i) Skin Integumentary Skin Color: Appropriate for ethnicity Skin Condition/Temp: Dry;Warm 
Skin Integrity: Incision (comment)(dressing with Kerlix in place L foot c/d/i) Turgor: Leathery Hair Growth: Present Varicosities: Absent Strength:   
Strength: Generally decreased, functional 
Tone & Sensation:  
Tone: Normal 
Sensation: Intact Range Of Motion: 
AROM: Within functional limits(except L ankle/foot not tested s/p surgery) Functional Mobility: 
Bed Mobility: 
Supine to Sit: Modified independent Sit to Supine: Modified independent Transfers: 
Sit to Stand: Contact guard assistance; Other (comment)(vc for hand placement) Stand to Sit: Contact guard assistance; Other (comment)(vc as above) Balance:  
Sitting: Intact Standing: Intact; With support(RW)Ambulation/Gait Training: 
Distance (ft): 48 Feet (ft) Assistive Device: Gait belt;Walker, rolling Ambulation - Level of Assistance: Contact guard assistance Gait Description (WDL): Exceptions to [facility] Gait Abnormalities: Antalgic;Decreased step clearance; Other(swing to gt) Left Side Weight Bearing: Non-weight bearing Base of Support: Shift to right Speed/Becka: Slow Step Length: Right shortened Interventions: Safety awareness training;Verbal cues; Visual/Demos Pain: 
Pain Scale 1: Numeric (0 - 10) Pain Intensity 1: 7 post; 8/10 pre session Pain Location 1: Foot Pain Orientation 1: Left Pain Description 1: Aching Pain Intervention(s) 1: Medication (see MAR) Activity Tolerance:  
Fair+ Please refer to the flowsheet for vital signs taken during this treatment. After treatment:  
[]         Patient left in no apparent distress sitting up in chair 
[x]         Patient left in no apparent distress in bed 
[x]         Call bell left within reach [x]         Nursing notified- Clint Pemberton present 
[]         Caregiver present 
[]         Bed alarm activated COMMUNICATION/EDUCATION:  
[x]         Fall prevention education was provided and the patient/caregiver indicated understanding. [x]         Patient/family have participated as able in goal setting and plan of care. [x]         Patient/family agree to work toward stated goals and plan of care. []         Patient understands intent and goals of therapy, but is neutral about his/her participation. []         Patient is unable to participate in goal setting and plan of care. Eval Complexity: History: HIGH Complexity :3+ comorbidities / personal factors will impact the outcome/ POC Exam:LOW Complexity : 1-2 Standardized tests and measures addressing body structure, function, activity limitation and / or participation in recreation  Presentation: LOW Complexity : Stable, uncomplicated  Clinical Decision Making:Low Complexity amb >30ft with RW/NWB LLE/CGA Overall Complexity:LOW Thank you for this referral. 
Perez Long, PT Time Calculation: 20 mins

## 2019-02-25 NOTE — CONSULTS
Brief Note -     Saw where he was admitted late last week for Toe amputation in setting of vascular innsuffiency. Agree with completing the initial 6wk course of IV CAX [id number] if there is any hint of residual infection proximal to his amputation. Can pull PICC after completing course.     La Mathur MD FACP  Infectious Diseases  Cell [phone]

## 2019-02-25 NOTE — PROGRESS NOTES
NUTRITION FOLLOW-UP 
RECOMMENDATIONS/PLAN:  
- Supplement: Ensure Enlive TID Monitor labs/lytes, PO intakes, skin integrity, wt, fluid status, BM Adult Malnutrition Criteria:  
 
  Nutrition assessment was completed by RDN and the patient was found to meet the following malnutrition criteria established by ASPEN/AND: 
 
Adult Malnutrition Guidelines: SEVERE PROTEIN CALORIE MALNUTRITION IN THE CONTEXT OF CHRONIC ILLNESS Body Fat:  Severe Depletion Muscle Mass: Severe Depletion Brian Little 
02/25/19 NUTRITION ASSESSMENT:  
Client Update: 62 yrs old Male with s/p TMA for gangrene, anemia, c/p post op pain, HTN, PVD. Pt is in too much pain to answer diet related questions. Did agree for Ensure will add. NFPE shows temporal wasting and clavicle showing FOOD/NUTRITION INTAKE Diet Order:  Bernarda Wilson Food Allergies: NKFA Average PO Intake:     
Patient Vitals for the past 100 hrs: 
 % Diet Eaten 02/23/19 1646 100 % 02/23/19 0939 100 % Pertinent Medications:  [x] Reviewed; pepcid, folic acid, dilaudid, zenpep, thiamine, Electrolyte Replacement Protocol: []K []Mg []PO4 Insulin:  []SSI  []Pre-meal   []Basal    []Drip  []None Cultural/Episcopalian Food Preferences: None Identified BIOCHEMICAL DATA & MEDICAL TESTS Pertinent Labs:  [x] Reviewed; ANTHROPOMETRICS Height: 5' 4\" (162.6 cm)       Weight: 49 kg (107 lb 16 oz) BMI: 18.5 kg/m^2 normal weight (18.5%-24.9% BMI) Adm Weight: 008 lbs                Weight change: -1lbs Adjusted Body Weight: n/a NUTRITION-FOCUSED PHYSICAL ASSESSMENT Skin: No PU     
GI: No BM 
NUTRITION PRESCRIPTION Calories: 1470 kcal/day based on 30kcal/kg Protein: 59 g/day based on 1.2 g/kg Fluid: 1470 ml/day based on 1 kcal/ml NUTRITION DIAGNOSES:  
1. inadequate energy intake related to to inadequate oral intake as evidence by BMI <19 
  
NUTRITION INTERVENTIONS:  
INTERVENTIONS:                                                     GOALS: 
 1. Oral supplement: ensure tid 1. Encourage PO intake >50% at most meals in the next 3 days.   
  
 
 
 
 
LEARNING NEEDS (Diet, Supplementation, Food/Nutrient-Drug Interaction): 
 [] None Identified   [] Education provided/documented      Identified and patient: [] Declined   [x] Was not appropriate/indicated NUTRITION MONITORING /EVALUATION:  
Follow PO intake Monitor wt Monitor renal labs, electrolytes, fluid status Monitor for additional supplement needs Previous Recommendations Implemented: yes Previous Goals Met:  yes -diet adv 
   
[] Participated in Interdisciplinary Rounds   
[x] Interdisciplinary Care Plan Reviewed DISCHARGE NUTRITION RECOMMENDATIONS ADDRESSED:  
  
  [x] To be determined closer to discharge NUTRITION RISK:           [x] At risk                        [] Not currently at risk Will follow-up per policy. Yolande Sweet 9

## 2019-02-25 NOTE — ROUTINE PROCESS
Bedside and Verbal shift change report given to Marielena Aaron RN by Josefina Rodriguez RN.  Report included the following information SBAR, Kardex, OR Summary, Intake/Output and MAR

## 2019-02-25 NOTE — PROGRESS NOTES
Problem: Falls - Risk of 
Goal: *Absence of Falls Document Natividad Toure Fall Risk and appropriate interventions in the flowsheet. Outcome: Progressing Towards Goal 
Fall Risk Interventions: 
Mobility Interventions: Patient to call before getting OOB, PT Consult for mobility concerns, PT Consult for assist device competence, Utilize walker, cane, or other assistive device Mentation Interventions: Adequate sleep, hydration, pain control Medication Interventions: Patient to call before getting OOB, Teach patient to arise slowly Elimination Interventions: Call light in reach, Patient to call for help with toileting needs, Toileting schedule/hourly rounds, Urinal in reach

## 2019-02-25 NOTE — ROUTINE PROCESS
Bedside and Verbal shift change report given to Evgeny Lopez RN (oncoming nurse) by Ag Banks RN 
 (offgoing nurse). Report included the following information SBAR, Kardex, Procedure Summary, Intake/Output and MAR.

## 2019-02-25 NOTE — PROGRESS NOTES
Problem: Self Care Deficits Care Plan (Adult) Goal: *Acute Goals and Plan of Care (Insert Text) Initial Occupational Therapy Goals (2/25/2019) Within 7 day(s): 1. Patient will perform perform grooming standing sinkside for 2-3 minutes for increased independence in ADLs. 2. Patient will perform LB dressing with setup for increased independence with ADLs. 3. Patient will perform all aspects of toileting with Supervision for increased independence with ADLs. 4. Patient will perform LE ADLs utilizing body mechanics & adaptive strategies with 1 verbal cue for increased safety in ADLs. 5. Patient will independently apply energy conservation techniques with 1 verbal cue(s) for increased independence with ADLs. 6. Patient will perform gentle residual limb desensitization over dressing with SBA/mod I to minimize phantom limb pain. 7. Patient will perform standing ADLs w/ 100% compliance with LLE NWB for increased safety with ADLs. Outcome: Progressing Towards Goal 
Occupational Therapy EVALUATION Patient: George Junior (62 y.o. male) Date: 2/25/2019 Primary Diagnosis: Foot infection [L08.9] Gangrene ([address] Candis Liner Cellulitis [L03.90] Procedure(s) (LRB): 
PARTIAL AMPUTATION OF LEFT FOOT WITH  TRANSMETATARSAL AMPUTATION, SOFT TISSUE REARRANGEMENT GREATER THAN 20CM (Left) 3 Days Post-Op Precautions:  Fall, NWB(L LE) ASSESSMENT : 
Based on the objective data described below, the patient presents with decreased functional strength, decreased functional balance, decreased overall activity tolerance limiting independence with ADLs. Patient declined OOB as stating she just was OOB washing up. Pt able to don sock on RLE while in bed. Pt reports he is home alone while spouse works and is concerned about urinal spillage and transport. Instructed on positioning on RW and securing for decreased spillage. Pt thankful. Instructed on clothing modifications and adaptive techniques to assist in independence. Pt would benefit from continued OT services to maximize independence in ADLs. Education: Reviewed home safety, body mechanics, importance of gentle movement every hour to assist with pain, residual limb and phantom pain (including techniques to assist in minimizing or inhibiting), Rolling Walker management/safety, and adaptive dressing techniques with patient verbalizing  understanding at this time Patient will benefit from skilled intervention to address the above impairments. Patients rehabilitation potential is considered to be Good Factors which may influence rehabilitation potential include:  
[]             None noted [x]             Mental ability/status [x]             Medical condition []             Home/family situation and support systems [x]             Safety awareness [x]             Pain tolerance/management 
[]             Other: PLAN : 
Recommendations and Planned Interventions: 
[x]               Self Care Training                  [x]        Therapeutic Activities [x]               Functional Mobility Training    [x]        Cognitive Retraining 
[x]               Therapeutic Exercises           [x]        Endurance Activities [x]               Balance Training                   [x]        Neuromuscular Re-Education []               Visual/Perceptual Training     [x]   Home Safety Training 
[x]               Patient Education                 [x]        Family Training/Education []               Other (comment): Frequency/Duration: Patient will be followed by occupational therapy 1-2 times per day/2-4 days per week to address goals. Discharge Recommendations: Home Health Further Equipment Recommendations for Discharge: To Be Determined (TBD) at next level of care (?Tub transfer bench) SUBJECTIVE:  
Patient stated I have a Tracy French.  (rolling walker) OBJECTIVE DATA SUMMARY:  
 
Past Medical History:  
Diagnosis Date  Gout  Hypertension  Pancreatitis chronic Past Surgical History:  
Procedure Laterality Date  HX CHOLECYSTECTOMY  VASCULAR SURGERY PROCEDURE UNLIST    
 vascular Barriers to Learning/Limitations: yes;  physical 
Compensate with: visual, verbal, tactile, kinesthetic cues/model Prior Level of Function/Home Situation: Pt reports spouse works; pt reports shower chair; pt reports when he is in pain, he stays in bed Home Situation Home Environment: Apartment # Steps to Enter: 4(steps up then down) Rails to Enter: Yes Hand Rails : Bilateral 
One/Two Story Residence: One story Living Alone: No 
Support Systems: Spouse/Significant Other/Partner Patient Expects to be Discharged to<IPADD> [facility] Current DME Used/Available at Home: Cane, straight Tub or Shower Type: Tub/Shower combination; shower chair 
[x]  Right hand dominant   []  Left hand dominant Cognitive/Behavioral Status: 
Neurologic State: Alert; Appropriate for age Orientation Level: Oriented X4 Cognition: Follows commands; Appropriate decision making; Appropriate for age attention/concentration; Appropriate safety awareness Safety/Judgement: Awareness of environment; Fall prevention Skin: L TMA w/ dressing Edema: No significant edema noted Vision/Perceptual:   
 appears [facility] Coordination: BUECoordination: Within functional limits Fine Motor Skills-Upper: Left Intact; Right Intact Gross Motor Skills-Upper: Left Intact; Right Intact Balance: 
Sitting: Intact Standing: Intact; With support(RW) 
Strength: BUEStrength: Generally decreased, functional 
Tone & Sensation: BUETone: Normal 
Sensation: Intact Range of Motion: BUEAROM: Within functional limits(except L ankle/foot not tested s/p surgery) Functional Mobility and Transfers for ADLs: 
Bed Mobility: 
Supine to Sit: Modified independent Sit to Supine: Modified independent Scooting: Modified independent Transfers: 
Sit to Stand: declined as pt states he was just OOB washing up w/ CNA ADL Assessment:  
Feeding: Setup Oral Facial Hygiene/Grooming: Setup Bathing: Setup (CNA confirms pt able to wash up w/ setup and able to walk to chair w/ RW maintaining NWB) Upper Body Dressing: Setup Lower Body Dressing: Minimum assistance; Additional time Toileting: Contact guard assistance ADL Intervention: 
Grooming Washing Hands: Supervision/set-up(wipes seated in bed) Lower Body Dressing Assistance Socks: Supervision/set-up(RLE) Position Performed: Long sitting on bed Toileting Bladder Hygiene: Contact guard assistance(w/ urinal) Cognitive Retraining Problem Solving: Inductive reason; Identifying the task; Identifying the problem;General alternative solution;Deductive reason; Awareness of environment Executive Functions: Executing cognitive plans;Regulating behavior Organizing/Sequencing: Breaking task down;Prioritizing Following Commands: Awareness of environment; Follows one step commands/directions Safety/Judgement: Awareness of environment; Fall prevention Cues: Tactile cues provided;Verbal cues provided;Visual cues provided Pain: 
Pre-treatment: 3/10 Post-treatment: 5/10 Activity Tolerance:  
Patient able to sit 5 minute(s). Patient able to complete ADLs with frequent rest breaks. Patient limited by pain/cognition. Please refer to the flowsheet for vital signs taken during this treatment. After treatment:  
[] Patient left in no apparent distress sitting up in chair 
[x] Patient left in no apparent distress in bed 
[x] Call bell left within reach [x] Nursing notified/Lilia [x] Caregiver present/RN/Lilia 
[] Bed alarm activated COMMUNICATION/EDUCATION:  
[x] Home safety education was provided and the patient/caregiver indicated understanding. [x] Patient/family have participated as able in goal setting and plan of care. [x] Patient/family agree to work toward stated goals and plan of care. [] Patient understands intent and goals of therapy, but is neutral about his/her participation. [] Patient is unable to participate in goal setting and plan of care. Thank you for this referral. 
Ronel Huggins, OTR/L Time Calculation: 23 mins G-Codes (GP) Self Care  Current  CJ= 20-39%  Goal  CI= 1-19% The severity rating is based on the professional judgement & direct observation of Level of Assistance required for Functional Mobility and ADLs. Eval Complexity: History: HIGH Complexity : Extensive review of history including physical, cognitive and psychosocial history ; Examination: HIGH Complexity : 5 or more performance deficits relating to physical, cognitive , or psychosocial skils that result in activity limitations and / or participation restrictions; Decision Making:HIGH Complexity : Patient presents with comorbidities that affect occupational performance. Signifigant modification of tasks or assistance (eg, physical or verbal) with assessment (s) is necessary to enable patient to complete evaluation

## 2019-02-26 ENCOUNTER — HOSPITAL ENCOUNTER (OUTPATIENT)
Dept: INFUSION THERAPY | Age: 59
Discharge: HOME | End: 2019-02-26
Payer: MEDICARE

## 2019-02-26 ENCOUNTER — HOME CARE VISIT (OUTPATIENT)
Dept: HOME HEALTH SERVICES | Facility: HOME HEALTH | Age: 59
End: 2019-02-26
Payer: MEDICARE

## 2019-02-26 LAB
ANION GAP SERPL CALC-SCNC: 4 MMOL/L (ref 3–18)
BUN SERPL-MCNC: 19 MG/DL (ref 7–18)
BUN/CREAT SERPL: 15 (ref 12–20)
CALCIUM SERPL-MCNC: 8.5 MG/DL (ref 8.5–10.1)
CHLORIDE SERPL-SCNC: 105 MMOL/L (ref 100–108)
CO2 SERPL-SCNC: 31 MMOL/L (ref 21–32)
CREAT SERPL-MCNC: 1.23 MG/DL (ref 0.6–1.3)
GLUCOSE SERPL-MCNC: 130 MG/DL (ref 74–99)
POTASSIUM SERPL-SCNC: 4.2 MMOL/L (ref 3.5–5.5)
SODIUM SERPL-SCNC: 140 MMOL/L (ref 136–145)

## 2019-02-26 PROCEDURE — 74011250637 HC RX REV CODE- 250/637: Performed by: INTERNAL MEDICINE

## 2019-02-26 PROCEDURE — 97530 THERAPEUTIC ACTIVITIES: CPT

## 2019-02-26 PROCEDURE — 3331090001 HH PPS REVENUE CREDIT

## 2019-02-26 PROCEDURE — 74011250636 HC RX REV CODE- 250/636: Performed by: HOSPITALIST

## 2019-02-26 PROCEDURE — 80048 BASIC METABOLIC PNL TOTAL CA: CPT

## 2019-02-26 PROCEDURE — 74011250636 HC RX REV CODE- 250/636: Performed by: INTERNAL MEDICINE

## 2019-02-26 PROCEDURE — 74011000250 HC RX REV CODE- 250: Performed by: INTERNAL MEDICINE

## 2019-02-26 PROCEDURE — 97116 GAIT TRAINING THERAPY: CPT

## 2019-02-26 PROCEDURE — 65270000029 HC RM PRIVATE

## 2019-02-26 PROCEDURE — 74011250637 HC RX REV CODE- 250/637: Performed by: HOSPITALIST

## 2019-02-26 PROCEDURE — 3331090002 HH PPS REVENUE DEBIT

## 2019-02-26 RX ADMIN — OXYCODONE AND ACETAMINOPHEN 2 TABLET: 5; 325 TABLET ORAL at 17:16

## 2019-02-26 RX ADMIN — HYDRALAZINE HYDROCHLORIDE 25 MG: 25 TABLET, FILM COATED ORAL at 21:00

## 2019-02-26 RX ADMIN — CLONIDINE HYDROCHLORIDE 0.2 MG: 0.1 TABLET ORAL at 21:00

## 2019-02-26 RX ADMIN — OXYCODONE AND ACETAMINOPHEN 2 TABLET: 5; 325 TABLET ORAL at 10:45

## 2019-02-26 RX ADMIN — Medication 10 ML: at 05:45

## 2019-02-26 RX ADMIN — PANCRELIPASE LIPASE, PANCRELIPASE PROTEASE, PANCRELIPASE AMYLASE 1 CAPSULE: 10000; 32000; 42000 CAPSULE, DELAYED RELEASE ORAL at 21:06

## 2019-02-26 RX ADMIN — FAMOTIDINE 20 MG: 20 TABLET ORAL at 10:09

## 2019-02-26 RX ADMIN — PANCRELIPASE LIPASE, PANCRELIPASE PROTEASE, PANCRELIPASE AMYLASE 1 CAPSULE: 10000; 32000; 42000 CAPSULE, DELAYED RELEASE ORAL at 17:16

## 2019-02-26 RX ADMIN — PANCRELIPASE LIPASE, PANCRELIPASE PROTEASE, PANCRELIPASE AMYLASE 1 CAPSULE: 10000; 32000; 42000 CAPSULE, DELAYED RELEASE ORAL at 10:45

## 2019-02-26 RX ADMIN — COLCHICINE 0.6 MG: 0.6 TABLET, FILM COATED ORAL at 10:09

## 2019-02-26 RX ADMIN — ATORVASTATIN CALCIUM 20 MG: 20 TABLET, FILM COATED ORAL at 10:09

## 2019-02-26 RX ADMIN — LISINOPRIL 40 MG: 20 TABLET ORAL at 10:09

## 2019-02-26 RX ADMIN — HYDRALAZINE HYDROCHLORIDE 25 MG: 25 TABLET, FILM COATED ORAL at 17:12

## 2019-02-26 RX ADMIN — OXYCODONE AND ACETAMINOPHEN 2 TABLET: 5; 325 TABLET ORAL at 05:44

## 2019-02-26 RX ADMIN — Medication 100 MG: at 10:09

## 2019-02-26 RX ADMIN — MORPHINE SULFATE 2 MG: 2 INJECTION, SOLUTION INTRAMUSCULAR; INTRAVENOUS at 21:00

## 2019-02-26 RX ADMIN — GABAPENTIN 400 MG: 400 CAPSULE ORAL at 17:13

## 2019-02-26 RX ADMIN — HEPARIN SODIUM 5000 UNITS: 5000 INJECTION INTRAVENOUS; SUBCUTANEOUS at 10:09

## 2019-02-26 RX ADMIN — Medication 10 ML: at 21:03

## 2019-02-26 RX ADMIN — SODIUM CHLORIDE 2 G: 9 INJECTION INTRAMUSCULAR; INTRAVENOUS; SUBCUTANEOUS at 14:26

## 2019-02-26 RX ADMIN — HYDRALAZINE HYDROCHLORIDE 25 MG: 25 TABLET, FILM COATED ORAL at 10:09

## 2019-02-26 RX ADMIN — MORPHINE SULFATE 2 MG: 2 INJECTION, SOLUTION INTRAMUSCULAR; INTRAVENOUS at 14:26

## 2019-02-26 RX ADMIN — OXYCODONE AND ACETAMINOPHEN 2 TABLET: 5; 325 TABLET ORAL at 01:54

## 2019-02-26 RX ADMIN — GABAPENTIN 400 MG: 400 CAPSULE ORAL at 21:00

## 2019-02-26 RX ADMIN — Medication 10 ML: at 14:00

## 2019-02-26 RX ADMIN — GABAPENTIN 400 MG: 400 CAPSULE ORAL at 14:26

## 2019-02-26 RX ADMIN — CLONIDINE HYDROCHLORIDE 0.2 MG: 0.1 TABLET ORAL at 10:09

## 2019-02-26 RX ADMIN — HEPARIN SODIUM 5000 UNITS: 5000 INJECTION INTRAVENOUS; SUBCUTANEOUS at 18:41

## 2019-02-26 RX ADMIN — HEPARIN SODIUM 5000 UNITS: 5000 INJECTION INTRAVENOUS; SUBCUTANEOUS at 02:00

## 2019-02-26 RX ADMIN — FOLIC ACID 1 MG: 1 TABLET ORAL at 10:09

## 2019-02-26 RX ADMIN — AMLODIPINE BESYLATE 10 MG: 5 TABLET ORAL at 10:09

## 2019-02-26 NOTE — ROUTINE PROCESS
Bedside and Verbal shift change report given to CINDY Murphy RN (oncoming nurse) by Trena Hair RN (offgoing nurse). Report included the following information SBAR, Kardex, Intake/Output and MAR.

## 2019-02-26 NOTE — PROGRESS NOTES
2121-alert and oriented x 4. Lungs CTA, but diminished. BS active x 4 quads. Pain rated at 8/10. Dressing in place to left foot CDI. Double lumen PICC line to left upper arm, flushing without difficulty. Patient NWB on left. 2130-Pain rated 8/10 to left foot/leg, Percocet x 2 given. 2220-Pain decreased to 5/10. 
 
0154-Pain rated at 8/10, Percocet x 2 given. 0240-pain rated 6/10. 
 
0414-blood drawn from  of double lumen PICC line without difficulty, flushed without problems. 0544-Pain rated at 7/10, Percocet x 2 given for pain at left foot/leg. Shift summary-Pain controlled with PRN pain medication. Voiding without difficulty. Tolerating meals with no problems. Kerlix wrap in place to left foot with no drainage noted.

## 2019-02-26 NOTE — PROGRESS NOTES
Problem: Falls - Risk of 
Goal: *Absence of Falls Document Liyah Knight Fall Risk and appropriate interventions in the flowsheet. Outcome: Progressing Towards Goal 
Fall Risk Interventions: 
Mobility Interventions: PT Consult for mobility concerns Mentation Interventions: Door open when patient unattended Medication Interventions: Patient to call before getting OOB Elimination Interventions: Call light in reach

## 2019-02-26 NOTE — ROUTINE PROCESS
Bedside, Verbal and Written shift change report given to J. Rachael Boas (oncoming nurse) by Caleb Holstein (offgoing nurse). Report included the following information SBAR, Kardex, Intake/Output and Recent Results. Assumed care at this time, pt aox4, denies pain, room air, up at [place] with walker, sitting up in bed eating. 1000  Pt up to bathroom, with out assistance. 1400  Pt status unchanged, sitting on bedside watching TV. VSS 
1800  Scheduled med's given, see MAR. Pt status unchanged. Bedside, Verbal and Written shift change report given to Triston Frederick (oncoming nurse) by J. Rachael Boas (offgoing nurse). Report included the following information SBAR, Procedure Summary, Intake/Output and Recent Results.

## 2019-02-26 NOTE — PROGRESS NOTES
Problem: Mobility Impaired (Adult and Pediatric) Goal: *Acute Goals and Plan of Care (Insert Text) Physical Therapy Goals Initiated 2/25/2019 and to be accomplished within 4 day(s) 1. Patient will move from supine <> sit with mod I/I in prep for out of bed activity and change of position. 2.  Patient will perform sit<> stand with S/mod I with LRAD in prep for transfers/ambulation. 3.  Patient will transfer from bed <> chair with S/mod I with LRAD/NWB L LE for time up in chair for completion of ADL activity. 4.  Patient will ambulate 100 feet with S/mod I with LRAD/NWB LLE for improved functional mobility/safe discharge. 5.  Patient will ascend/descend 3-5 stairs with handrail(s) with minimal assistance/contact guard assist NWB LLE for home re-entry as needed. Outcome: Progressing Towards Goal 
physical Therapy TREATMENT Patient: Mathew Severin (62 y.o. male) Date: 2/26/2019 Diagnosis: Foot infection [L08.9] Gangrene ([address] Farhana Leventhal Cellulitis [L03.90] Foot infection Procedure(s) (LRB): 
PARTIAL AMPUTATION OF LEFT FOOT WITH  TRANSMETATARSAL AMPUTATION, SOFT TISSUE REARRANGEMENT GREATER THAN 20CM (Left) 4 Days Post-Op Precautions: Fall, NWB(L LE) Chart, physical therapy assessment, plan of care and goals were reviewed. ASSESSMENT: 
Pt is not requiring significant assistance, but would benefit from placement to improve endurance. Pt notes needing to be able to [facility] when he returns to home. Pt is progressing fairly at this time. Progression toward goals: 
[]      Improving appropriately and progressing toward goals [x]      Improving slowly and progressing toward goals 
[]      Not making progress toward goals and plan of care will be adjusted PLAN: 
Patient continues to benefit from skilled intervention to address the above impairments. Continue treatment per established plan of care. Discharge Recommendations:  Candelario Hernández Further Equipment Recommendations for Discharge:  Pediatric rolling walker SUBJECTIVE:  
Patient stated I'm getting ready for a nap.  OBJECTIVE DATA SUMMARY:  
Critical Behavior: 
Neurologic State: Alert, Appropriate for age Orientation Level: Oriented X4 Cognition: Follows commands Safety/Judgement: Awareness of environment, Fall prevention Functional Mobility Training: 
Bed Mobility: 
Rolling: Modified independent Supine to Sit: Modified independent Sit to Supine: Modified independent Transfers: 
Sit to Stand: Contact guard assistance Stand to Sit: Contact guard assistance Balance: 
Sitting: Intact Standing: Intact; With supportAmbulation/Gait Training: 
Distance (ft): 50 Feet (ft) Assistive Device: Gait belt;Walker, rolling Ambulation - Level of Assistance: Contact guard assistance Gait Abnormalities: Antalgic;Decreased step clearance; Other Left Side Weight Bearing: Non-weight bearing Base of Support: Shift to right;Narrowed Stance: Right increased Speed/Becka: Slow Step Length: Right shortened Interventions: Safety awareness training;Verbal cues; Visual/Demos Pain: 
Pain Scale 1: Numeric (0 - 10) Pain Intensity 1: 0 Pain Location 1: Leg 
Pain Orientation 1: Anterior Pain Description 1: Aching; Sore Pain Intervention(s) 1: Medication (see MAR) Activity Tolerance:  
Fair Please refer to the flowsheet for vital signs taken during this treatment. After treatment:  
[] Patient left in no apparent distress sitting up in chair 
[x] Patient left in no apparent distress in bed 
[x] Call bell left within reach [x] Nursing notified 
[] Caregiver present 
[] Bed alarm activated Carrol Hendrix PTA Time Calculation: 27 mins

## 2019-02-26 NOTE — ROUTINE PROCESS
1940 
Bedside shift change report given to Rosangela Ryan RN (oncoming nurse) by Kisha Connor RN (offgoing nurse). Report included the following information SBAR, Kardex and MAR.

## 2019-02-26 NOTE — PROGRESS NOTES
D/C Plan: [address] [place] [facility] (pending insurance auth) 2/27/19 Noted pt has been declined by [facility]. [facility] has accepted this pt. CM met with pt at bedside to provide an update. Pt is agreeable to transitioning to [facility] when medically stable. Pt has indicated his wife will transport him to [facility] when discharged. Pt will also require insurance auth for placement. CM will initiate insurance auth in preparation for transition tomorrow (2/27/19) if medically stable. Pt will transition to [address] and [address] [place] [address], [address], [phone] for report pending insurance auth. CM continuing to follow and assist with transition to [facility]. 1530: Insurance Deanna Kaur has been initiated and clinical information has been sent for review. CM continuing to follow. Care Management Interventions PCP Verified by CM: Yes Mode of Transport at Discharge: Other (see comment)(Family) Transition of Care Consult (CM Consult):  [address]: Yes Health Maintenance Reviewed: Yes Physical Therapy Consult: Yes Occupational Therapy Consult: Yes Current Support Network: Lives with Spouse, Family Lives [place] Confirm Follow Up Transport: Family Discharge Location Discharge Placement: Skilled nursing facility

## 2019-02-26 NOTE — PROGRESS NOTES
Hospitalist Progress Note Patient: Nydia Cervantes MRN: [id number]  CSN: [id number] YOB: 1960  Age: 62 y.o. Sex: male DOA: 2/21/2019 LOS:  LOS: 5 days Chief Complaint: foot pain Assessment/Plan Disposition : 
Patient Active Problem List  
Diagnosis Code  Pancreatitis K85.90  
 Dehydration E86.0  Abdominal pain R10.9  Gout attack M10.9  Cellulitis of left foot L03. [facility] Hypertension I10  Smoker F17.200  Pancreatitis chronic  Gout M10.9  PAD (peripheral artery disease) ([facility]) I73.9  Alcohol use Z78.9  Hyponatremia E87.1  Cocaine abuse [address] F14.10  Alcohol withdrawal ([facility]) F10.239  Mal[facility] ([facility] E46  
 Encephalopathy acute G93.40  Foot infection L08.9  [facility] ([facility] I96  
 Cellulitis L03.90  
 
63 y/o male s/p TMA by Dr. Murrieta Cord issues including copd, chronic pancreatitis and htn presnent.  C/o post op pain. Taking po dilaudid tablets and gabapentin. Reports satisfactory pain control. 
  
Gangrene foot s/p TMA. Chronic pancreatitis. COPD PVD HTN Smoker. Unclear etoh hx. Anemia acute blood loss / post op.  
  
-Continue dilaudid and gabapentin. 
-Nebs prn for copd. -Continue bp meds. -DVT px heparin. 
-Dispo - TBD.  Pt consult. 
-Podiatry note reviewed. ID note reviewed. Interested in SNF. Has list of facilities. Will go with picc and ceftriaxone. 
  
 
 
 
 
Subjective: 
 
Pain controlled as long as he does not miss a dose of his pain meds. Post surgical pain. Otherwise feeling well. Review of systems: 
 
Constitutional: denies fevers, chills, myalgias Respiratory: denies SOB, cough Cardiovascular: denies chest pain, palpitations Gastrointestinal: denies nausea, vomiting, diarrhea Vital signs/Intake and Output: 
Visit Vitals /80 Pulse 68 Temp 98.3 °F (36.8 °C) Resp 18 Ht 5' 4\" (1.626 m) Wt 45.8 kg (100 lb 15.5 oz) SpO2 100% BMI 17.33 kg/m² Current Shift:  02/26 0701 - 02/26 1900 In: 480 [P.O.:480] Out: 600 [Urine:600] Last three shifts:  02/24 1901 - 02/26 0700 In: 2830 [P.O.:1230; I.V.:1600] Out: 1175 [PYUMK:0052] Exam: 
 
General: Well developed, alert, NAD, OX3 Head/Neck: mmm CVS:s1s2 rrr 
Lungs:CTABL. Abdomen: Soft, BS+ Extremities: No edema. Left TMA. Clean dressing in place. Labs: Results:  
   
Chemistry Recent Labs  
  02/26/19 
0414 02/25/19 
0505 02/24/19 
0400 * 101* 151*  138 138  
K 4.2 4.2 4.0  
 104 102 CO2 31 29 30 BUN 19* 12 9 CREA 1.23 1.05 1.17 CA 8.5 8.7 9.1 AGAP 4 5 6 BUCR 15 11* 8*  
  
CBC w/Diff Recent Labs  
  02/25/19 
0505 WBC 10.4 RBC 3.68* HGB 10.0* HCT 32.6* * Cardiac Enzymes No results for input(s): CPK, CKND1, DOUGLAS in the last 72 hours. No lab exists for component: Renee Leaver Coagulation No results for input(s): PTP, INR, APTT in the last 72 hours. No lab exists for component: INREXT Lipid Panel Lab Results Component Value Date/Time Cholesterol, total 126 02/24/2014 09:52 AM  
 HDL Cholesterol 58 02/24/2014 09:52 AM  
 LDL, calculated 60.6 02/24/2014 09:52 AM  
 VLDL, calculated 7.4 02/24/2014 09:52 AM  
 Triglyceride 37 02/24/2014 09:52 AM  
 CHOL/HDL Ratio 2.2 02/24/2014 09:52 AM  
  
BNP No results for input(s): BNPP in the last 72 hours. Liver Enzymes No results for input(s): TP, ALB, TBIL, AP, SGOT, GPT in the last 72 hours. No lab exists for component: DBIL Thyroid Studies Lab Results Component Value Date/Time TSH 0.84 02/24/2014 09:52 AM  
    
Procedures/imaging: see electronic medical records for all procedures/Xrays and details which were not copied into this note but were reviewed prior to creation of Plan [place] MD

## 2019-02-26 NOTE — PROGRESS NOTES
Problem: Falls - Risk of 
Goal: *Absence of Falls Document Orvel Buffy Fall Risk and appropriate interventions in the flowsheet. Outcome: Progressing Towards Goal 
Fall Risk Interventions: 
Mobility Interventions: Patient to call before getting OOB Mentation Interventions: Door open when patient unattended Medication Interventions: Patient to call before getting OOB Elimination Interventions: Call light in reach

## 2019-02-27 ENCOUNTER — HOSPITAL ENCOUNTER (OUTPATIENT)
Dept: INFUSION THERAPY | Age: 59
Discharge: HOME | End: 2019-02-27
Payer: MEDICARE

## 2019-02-27 VITALS
WEIGHT: 100.97 LBS | BODY MASS INDEX: 17.24 KG/M2 | HEIGHT: 64 IN | RESPIRATION RATE: 18 BRPM | HEART RATE: 92 BPM | OXYGEN SATURATION: 100 % | DIASTOLIC BLOOD PRESSURE: 79 MMHG | SYSTOLIC BLOOD PRESSURE: 130 MMHG | TEMPERATURE: 99.2 F

## 2019-02-27 PROCEDURE — 74011250637 HC RX REV CODE- 250/637: Performed by: INTERNAL MEDICINE

## 2019-02-27 PROCEDURE — 97116 GAIT TRAINING THERAPY: CPT

## 2019-02-27 PROCEDURE — 74011250636 HC RX REV CODE- 250/636: Performed by: HOSPITALIST

## 2019-02-27 PROCEDURE — 74011250636 HC RX REV CODE- 250/636: Performed by: INTERNAL MEDICINE

## 2019-02-27 PROCEDURE — 74011250637 HC RX REV CODE- 250/637: Performed by: HOSPITALIST

## 2019-02-27 PROCEDURE — 3331090002 HH PPS REVENUE DEBIT

## 2019-02-27 PROCEDURE — 3331090001 HH PPS REVENUE CREDIT

## 2019-02-27 PROCEDURE — 97530 THERAPEUTIC ACTIVITIES: CPT

## 2019-02-27 RX ORDER — IBUPROFEN 200 MG
1 TABLET ORAL DAILY
Qty: 30 PATCH | Refills: 0 | Status: SHIPPED | OUTPATIENT
Start: 2019-02-28 | End: 2019-03-30

## 2019-02-27 RX ORDER — ACETAMINOPHEN 325 MG/1
650 TABLET ORAL
Status: DISCONTINUED | OUTPATIENT
Start: 2019-02-27 | End: 2019-02-27 | Stop reason: HOSPADM

## 2019-02-27 RX ORDER — OXYCODONE AND ACETAMINOPHEN 7.5; 325 MG/1; MG/1
1 TABLET ORAL
Qty: 15 TAB | Refills: 0 | Status: SHIPPED | OUTPATIENT
Start: 2019-02-27 | End: 2019-03-06

## 2019-02-27 RX ORDER — GABAPENTIN 400 MG/1
400 CAPSULE ORAL 4 TIMES DAILY
Qty: 10 CAP | Refills: 0 | Status: SHIPPED | OUTPATIENT
Start: 2019-02-27

## 2019-02-27 RX ORDER — CEFTRIAXONE SODIUM 2 G/1
2 INJECTION, POWDER, FOR SOLUTION INTRAVENOUS EVERY 24 HOURS
Qty: 2 G | Refills: 0 | Status: SHIPPED | OUTPATIENT
Start: 2019-02-27 | End: 2019-03-20

## 2019-02-27 RX ORDER — IPRATROPIUM BROMIDE AND ALBUTEROL SULFATE 2.5; .5 MG/3ML; MG/3ML
3 SOLUTION RESPIRATORY (INHALATION)
Qty: 30 NEBULE | Refills: 0 | Status: SHIPPED | OUTPATIENT
Start: 2019-02-27

## 2019-02-27 RX ORDER — HYDRALAZINE HYDROCHLORIDE 25 MG/1
25 TABLET, FILM COATED ORAL 3 TIMES DAILY
Qty: 3 TAB | Refills: 0 | Status: SHIPPED
Start: 2019-02-27

## 2019-02-27 RX ORDER — AMMONIUM LACTATE 12 G/100G
LOTION TOPICAL 2 TIMES DAILY
Status: DISCONTINUED | OUTPATIENT
Start: 2019-02-27 | End: 2019-02-27 | Stop reason: HOSPADM

## 2019-02-27 RX ORDER — AMMONIUM LACTATE 12 G/100G
LOTION TOPICAL
Qty: 1 BOTTLE | Refills: 1 | Status: SHIPPED | OUTPATIENT
Start: 2019-02-27

## 2019-02-27 RX ADMIN — OXYCODONE AND ACETAMINOPHEN 2 TABLET: 5; 325 TABLET ORAL at 01:45

## 2019-02-27 RX ADMIN — FAMOTIDINE 20 MG: 20 TABLET ORAL at 08:43

## 2019-02-27 RX ADMIN — GABAPENTIN 400 MG: 400 CAPSULE ORAL at 13:45

## 2019-02-27 RX ADMIN — Medication 10 ML: at 08:11

## 2019-02-27 RX ADMIN — Medication: at 13:55

## 2019-02-27 RX ADMIN — ATORVASTATIN CALCIUM 20 MG: 20 TABLET, FILM COATED ORAL at 08:43

## 2019-02-27 RX ADMIN — AMLODIPINE BESYLATE 10 MG: 5 TABLET ORAL at 08:44

## 2019-02-27 RX ADMIN — SODIUM CHLORIDE 2 G: 9 INJECTION INTRAMUSCULAR; INTRAVENOUS; SUBCUTANEOUS at 13:44

## 2019-02-27 RX ADMIN — GABAPENTIN 400 MG: 400 CAPSULE ORAL at 08:44

## 2019-02-27 RX ADMIN — OXYCODONE AND ACETAMINOPHEN 2 TABLET: 5; 325 TABLET ORAL at 13:46

## 2019-02-27 RX ADMIN — FOLIC ACID 1 MG: 1 TABLET ORAL at 08:44

## 2019-02-27 RX ADMIN — PANCRELIPASE LIPASE, PANCRELIPASE PROTEASE, PANCRELIPASE AMYLASE 1 CAPSULE: 10000; 32000; 42000 CAPSULE, DELAYED RELEASE ORAL at 09:01

## 2019-02-27 RX ADMIN — MORPHINE SULFATE 2 MG: 2 INJECTION, SOLUTION INTRAMUSCULAR; INTRAVENOUS at 05:14

## 2019-02-27 RX ADMIN — Medication 100 MG: at 08:43

## 2019-02-27 RX ADMIN — HEPARIN SODIUM 5000 UNITS: 5000 INJECTION INTRAVENOUS; SUBCUTANEOUS at 13:45

## 2019-02-27 RX ADMIN — HEPARIN SODIUM 5000 UNITS: 5000 INJECTION INTRAVENOUS; SUBCUTANEOUS at 03:46

## 2019-02-27 RX ADMIN — LISINOPRIL 40 MG: 20 TABLET ORAL at 08:44

## 2019-02-27 RX ADMIN — CLONIDINE HYDROCHLORIDE 0.2 MG: 0.1 TABLET ORAL at 08:44

## 2019-02-27 RX ADMIN — Medication 5 ML: at 14:00

## 2019-02-27 RX ADMIN — COLCHICINE 0.6 MG: 0.6 TABLET, FILM COATED ORAL at 08:44

## 2019-02-27 RX ADMIN — HYDRALAZINE HYDROCHLORIDE 25 MG: 25 TABLET, FILM COATED ORAL at 08:43

## 2019-02-27 RX ADMIN — OXYCODONE AND ACETAMINOPHEN 2 TABLET: 5; 325 TABLET ORAL at 08:43

## 2019-02-27 NOTE — PROGRESS NOTES
Pt and family given discharge instructions and education. They were also given the package with prescriptions that they will give to the Rehab center as well as the pt's cane. Pt was transported to the car via wheelchair. Will continue to monitor.

## 2019-02-27 NOTE — PROGRESS NOTES
Received report from [employer]. Pt requested pain medication, was given as prescribed. Dressing is intact and free of drainage. No other issues on this shift. Shift report given to [address] [place] [address] @ [phone]. Bedside and Verbal shift change report given to [address] [place] [address] (oncoming nurse) by Rosy Rojas (offgoing nurse). Report included the following information SBAR, Kardex, OR Summary and MAR.

## 2019-02-27 NOTE — PROGRESS NOTES
Problem: Mobility Impaired (Adult and Pediatric) Goal: *Acute Goals and Plan of Care (Insert Text) Physical Therapy Goals Initiated 2/25/2019 and to be accomplished within 4 day(s) 1. Patient will move from supine <> sit with mod I/I in prep for out of bed activity and change of position. 2.  Patient will perform sit<> stand with S/mod I with LRAD in prep for transfers/ambulation. 3.  Patient will transfer from bed <> chair with S/mod I with LRAD/NWB L LE for time up in chair for completion of ADL activity. 4.  Patient will ambulate 100 feet with S/mod I with LRAD/NWB LLE for improved functional mobility/safe discharge. 5.  Patient will ascend/descend 3-5 stairs with handrail(s) with minimal assistance/contact guard assist NWB LLE for home re-entry as needed. Outcome: Progressing Towards Goal 
physical Therapy TREATMENT Patient: Lynette Banks (62 y.o. male) Date: 2/27/2019 Diagnosis: Foot infection [L08.9] Gangrene ([address] Sonja Fenton Cellulitis [L03.90] Foot infection Procedure(s) (LRB): 
PARTIAL AMPUTATION OF LEFT FOOT WITH  TRANSMETATARSAL AMPUTATION, SOFT TISSUE REARRANGEMENT GREATER THAN 20CM (Left) 5 Days Post-Op Precautions: Fall, NWB(L LE) Chart, physical therapy assessment, plan of care and goals were reviewed. ASSESSMENT: 
Session used to transition to standing, ambulate to/from restroom. ADL's performed fairly. Significant fatigue post treatment. Progression toward goals: 
[]      Improving appropriately and progressing toward goals [x]      Improving slowly and progressing toward goals 
[]      Not making progress toward goals and plan of care will be adjusted PLAN: 
Patient continues to benefit from skilled intervention to address the above impairments. Continue treatment per established plan of care. Discharge Recommendations:  SNF Further Equipment Recommendations for Discharge:  rolling walker SUBJECTIVE:  
Patient stated Kyara Lombardi.  OBJECTIVE DATA SUMMARY:  
 Critical Behavior: 
Neurologic State: Alert Orientation Level: Oriented X4 Cognition: Follows commands, Appropriate decision making Safety/Judgement: Awareness of environment, Fall prevention Functional Mobility Training: 
Bed Mobility: 
Rolling: Modified independent Supine to Sit: Modified independent Sit to Supine: Modified independent Transfers: 
Sit to Stand: Supervision Stand to Sit: Supervision Balance: 
Sitting: Intact Standing: Intact; With supportAmbulation/Gait Training: 
Distance (ft): 30 Feet (ft) Assistive Device: Gait belt;Walker, rolling Ambulation - Level of Assistance: Contact guard assistance Gait Abnormalities: Antalgic;Decreased step clearance; Other Left Side Weight Bearing: Non-weight bearing Base of Support: Shift to right;Narrowed Stance: Right increased Step Length: Right shortened Interventions: Safety awareness training;Verbal cues; Visual/Demos Pain: 
Pain Scale 1: Numeric (0 - 10) Pain Intensity 1: 8 Pain Location 1: Foot Pain Orientation 1: Left; Lower Pain Description 1: Burning; Throbbing Pain Intervention(s) 1: Medication (see MAR) Activity Tolerance:  
Fair Please refer to the flowsheet for vital signs taken during this treatment. After treatment:  
[] Patient left in no apparent distress sitting up in chair 
[x] Patient left in no apparent distress in bed 
[x] Call bell left within reach [x] Nursing notified 
[] Caregiver present 
[] Bed alarm activated No Naylor PTA Time Calculation: 30 mins

## 2019-02-27 NOTE — PROGRESS NOTES
Shift summary: Pt is A/O x4, up with walker. Foot wrapped with Kerlix, C/D/I. Pain controlled by Percocet every 4 hours and Morphine IV every 3 hours prn, respectively. Refused CHG wipes stating \"It stings. \" wanted to have bath later on in the day. Shift uneventful

## 2019-02-27 NOTE — PROGRESS NOTES
TRANSFER - OUT REPORT: 
 
Verbal report given to JYOTI Mclaughlin (name) on [employer]  being transferred to [facility] (unit) for routine progression of care Report consisted of patients Situation, Background, Assessment and  
Recommendations(SBAR). Information from the following report(s) SBAR, Kardex, Intake/Output and MAR was reviewed with the receiving nurse. Lines: PICC Double Lumen 10/29/18 Brachial;Left (Active) Central Line Being Utilized Yes 2/27/2019  1:42 AM  
Criteria for Appropriate Use Long term IV/antibiotic administration 2/27/2019  1:42 AM  
Site Assessment Clean, dry, & intact 2/27/2019  1:42 AM  
Phlebitis Assessment 0 2/27/2019  1:42 AM  
Infiltration Assessment 0 2/27/2019  1:42 AM  
Date of Last Dressing Change 02/27/19 2/27/2019  3:46 AM  
Dressing Status Clean, dry, & intact 2/27/2019  1:42 AM  
Action Taken Blood drawn 2/25/2019  4:59 AM  
Dressing Type Disk with Chlorhexadine gluconate (CHG); Transparent 2/27/2019  1:42 AM  
Hub Color/Line Status Purple;Capped 2/27/2019  1:42 AM  
Positive Blood Return (Site #1) Yes 2/26/2019  8:50 PM  
Hub Color/Line Status Red;Capped 2/27/2019  1:42 AM  
Positive Blood Return (Site #2) Yes 2/26/2019  8:50 PM  
Alcohol Cap Used Yes 2/27/2019  1:42 AM  
   
PICC Double Lumen 92/85/81 Left;Basilic (Active) Peripheral IV 02/22/19 Right Antecubital (Active) Site Assessment Clean, dry, & intact 2/25/2019  9:21 PM  
Phlebitis Assessment 0 2/25/2019  9:21 PM  
Infiltration Assessment 0 2/25/2019  9:21 PM  
Dressing Status Clean, dry, & intact 2/25/2019  9:21 PM  
Dressing Type Tape;Transparent 2/25/2019  9:21 PM  
Hub Color/Line Status Blue; Infusing 2/25/2019  9:21 PM  
Action Taken Open ports on tubing capped 2/25/2019  9:21 PM  
Alcohol Cap Used Yes 2/25/2019  9:21 PM  
  
 
Opportunity for questions and clarification was provided. Patient transported with: 
 Celiro

## 2019-02-27 NOTE — ROUTINE PROCESS
Bedside and Verbal shift change report given to Yael Branham RN (oncoming nurse) by Imani Garcia RN 
 (offgoing nurse). Report included the following information SBAR, Kardex, Intake/Output, MAR and Recent Results.

## 2019-02-27 NOTE — DISCHARGE SUMMARY
[facility] SUMMARY    Name:  Augustus Alvarenga  MR#:   [id number]  :  1960  ACCOUNT #:  [de-identified]  ADMIT DATE:  2019  DISCHARGE DATE:  2019    Patient is going to a skilled nursing facility. DISCHARGE DIAGNOSES:  1. 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 2.  Chronic pancreatitis. 3.  Severe peripheral vascular disease. 4.  Hypertension. 5.  Tobacco use. 6.  History of alcohol use. 7.  Anemia, acute blood loss. 8.  Chronic obstructive pulmonary disease. CONSULTANTS:  1. Daniel Sherwood MD, Vascular Surgery. 2.  Tu Leung DPM, Podiatry. 3.  Ashley Cortés MD, Infectious Disease. HOSPTIAL SUMMARY:  This is a 57-year-old gentleman with a history of tobacco and alcohol use as well as other substance use. He has peripheral vascular disease. He recently had a procedure for his circulation on the left lower extremity prior to this admission. In fact, it was at the beginning of this month in February he had a hardware removal first of his left foot by Podiatry and a left popliteal thrombectomy and evaluation of a stent by Vascular Surgery. The patient came back to the hospital with worsening darkening skin and pain on the foot at the surgical site. There was darkening and necrosis noted consistent with gangrene. He was admitted back to the hospital, continued on his antibiotics as he was already on Rocephin for a 6-week course until 2019. He had a PICC line in place which looks well. He was seen by Podiatry, who recommended a transmetatarsal amputation. He has had that completed. He has been followed by Vascular Surgery and Infectious Disease, and seems to be tolerating his postoperative course well except for pain which is now better with Percocet and Neurontin also. Vascular recommends continuing dressing care and they will follow peripherally. Surgery has recommended his discharge is appropriate.   He decided he wanted to go to a skilled nursing facility until arrangements are being made for that and insurance authorization pending. White count is normal at 10.4. H and H 10 and 32.6, platelets are 704. His metabolic panel is within normal limits. Glucose slightly elevated at 130. His urine drug screen of note was positive for opiates on the 02/22/2019. He has been positive for cocaine as early as 02/04/2019. Wound culture from 02/07/2019 came back few Staph aureus prior to this admission, and at this point, he had a low-grade temperature earlier at 100 but is now down to 98.9. He appears nontoxic. His lungs are clear. Cardiac exam within normal limits. Abdomen is soft, nontender. He denies any diarrhea. He had a bowel movement yesterday. He is eating fine. PT has been working with him. His abdomen is soft, nontender. Left lower extremity is dressed at the foot. No peripheral edema noted. Today's vital signs otherwise include a blood pressure of 148/75, respiratory rate is 18, SaO2 of 95% on room air. Temperature current is 98.9. As far as antibiotics, he is on IV Rocephin 2 g daily. He is on a statin, blood pressure medicine Norvasc, Catapres, hydralazine. He is on chronic pancreatic enzymes daily. He is on thiamine for history of alcohol use as well as folic acid. He is on Pepcid for history of gastroesophageal reflux disease and colchicine for history of gout. Plan is to discharge him to Rehab once arrangements are made today. Follow up with Dr. Nathen Hodgkin on 03/08/2019 as scheduled and with his PCP after release from facility in 2 weeks, that is Dr. Bouchra Stephens. DISCHARGE MEDICATIONS:  Rocephin 2 g IV daily through the PICC until 03/20/2019 and thereafter the PICC can be removed.   Continue Lipitor 20 mg daily, Lac-Hydrin solution for his eczema twice daily, Norvasc 10 mg daily, DuoNeb every 4 hours as needed for shortness of breath and wheezing, Catapres 0.1 mg twice daily, Colcrys 0.6 mg daily, Pepcid 20 mg daily, folic acid 1 mg daily, Neurontin 400 mg four times daily, hydralazine 25 mg three times daily, Atarax 25 mg every 8 hours as needed for itching, Creon one tablet three times daily with meals, lisinopril 40 mg daily, Nicoderm patch 14 mg per day, Percocet 7.5 mg tablet one to two every 6 hours as needed for severe pain, thiamine 100 mg daily. She should be on a cardiac regular diet with Ensure Enlive breakfast, lunch, and dinner. He is stable to leave to Rehab today. Pull the PICC line after 03/20/2019 date for antibiotics. 35-minute discharge time.       Hernandez Leone MD      [id number]  D:  02/27/2019 11:06  T:  02/27/2019 15:39  JOB #:  [id number]

## 2019-02-27 NOTE — PROGRESS NOTES
Problem: Mobility Impaired (Adult and Pediatric) Goal: *Acute Goals and Plan of Care (Insert Text) Physical Therapy Goals Initiated 2/25/2019 and to be accomplished within 4 day(s) 1. Patient will move from supine <> sit with mod I/I in prep for out of bed activity and change of position. 2.  Patient will perform sit<> stand with S/mod I with LRAD in prep for transfers/ambulation. 3.  Patient will transfer from bed <> chair with S/mod I with LRAD/NWB L LE for time up in chair for completion of ADL activity. 4.  Patient will ambulate 100 feet with S/mod I with LRAD/NWB LLE for improved functional mobility/safe discharge. 5.  Patient will ascend/descend 3-5 stairs with handrail(s) with minimal assistance/contact guard assist NWB LLE for home re-entry as needed. Outcome: Progressing Towards Goal 
physical Therapy TREATMENT Patient: Kayleen Kocher (62 y.o. male) Date: 2/27/2019 Diagnosis: Foot infection [L08.9] Gangrene ([address] Rudy Lars Cellulitis [L03.90] Foot infection Procedure(s) (LRB): 
PARTIAL AMPUTATION OF LEFT FOOT WITH  TRANSMETATARSAL AMPUTATION, SOFT TISSUE REARRANGEMENT GREATER THAN 20CM (Left) 5 Days Post-Op Precautions: Fall, NWB(L LE) Chart, physical therapy assessment, plan of care and goals were reviewed. ASSESSMENT: 
Pt is very mobile and increasing with OOB safety (gradually). Placement will be needed for ABX, but continued PT will be beneficial, until WB is resumed. Progression toward goals: 
[]      Improving appropriately and progressing toward goals [x]      Improving slowly and progressing toward goals 
[]      Not making progress toward goals and plan of care will be adjusted PLAN: 
Patient continues to benefit from skilled intervention to address the above impairments. Continue treatment per established plan of care. Discharge Recommendations:  Candelario Hernández Further Equipment Recommendations for Discharge:  bedside commode and rolling walker SUBJECTIVE:  
Patient stated I want to work with .  OBJECTIVE DATA SUMMARY:  
Critical Behavior: 
Neurologic State: Alert, Appropriate for age Orientation Level: Oriented X4 Cognition: Follows commands, Appropriate safety awareness, Appropriate for age attention/concentration Safety/Judgement: Awareness of environment, Fall prevention Functional Mobility Training: 
Bed Mobility: 
Rolling: Modified independent Supine to Sit: Modified independent Sit to Supine: Modified independent Transfers: 
Sit to Stand: Supervision Stand to Sit: Supervision Balance: 
Sitting: Intact Standing: Intact; With supportAmbulation/Gait Training: 
Distance (ft): 60 Feet (ft) Assistive Device: Gait belt;Walker, rolling Ambulation - Level of Assistance: Contact guard assistance Gait Abnormalities: Antalgic;Decreased step clearance; Other Left Side Weight Bearing: Non-weight bearing Base of Support: Shift to right;Narrowed Stance: Right increased Step Length: Right shortened Interventions: Safety awareness training;Verbal cues; Visual/Demos Pain: 
Pain Scale 1: Numeric (0 - 10) Pain Intensity 1: 8 Pain Location 1: Foot Pain Orientation 1: Left; Lower Pain Description 1: Burning; Throbbing Pain Intervention(s) 1: Medication (see MAR) Activity Tolerance:  
Fair Please refer to the flowsheet for vital signs taken during this treatment. After treatment:  
[] Patient left in no apparent distress sitting up in chair 
[x] Patient left in no apparent distress in bed 
[x] Call bell left within reach [x] Nursing notified 
[] Caregiver present 
[] Bed alarm activated Hector Monge PTA Time Calculation: 25 mins

## 2019-02-27 NOTE — PROGRESS NOTES
Dressing taken down from left foot--suture line intact except medially Both anterior and posterior aspect of wound viable Will continue to follow peripherally Rupal Owen [facility] Asst Prof Surgery PG [phone]

## 2019-02-27 NOTE — PROGRESS NOTES
Hospitalist Progress Note Patient: Jasmina Martinez MRN: [id number]  CSN: [id number] YOB: 1960  Age: 62 y.o. Sex: male DOA: 2/21/2019 LOS:  LOS: 6 days Chief Complaint: 
 
Gangrene of foot Assessment/Plan  
 
  
Gangrene foot s/p TMA. Already completing course of rocephin until March 20, has picc line Chronic pancreatitis. No acute changes COPD-stable, no 02 requirement PVD-seen by vasc surgery, no changes HTN-continue ACE, hydralazine Smoker. nicoderm patch Unclear etoh hx. Anemia acute blood loss / post op. Stable D/c pending to rehab, auth from insurance needed Stable to d/c today if arrangements made 
  
 
 
Disposition :SNF Patient Active Problem List  
Diagnosis Code  Pancreatitis K85.90  
 Dehydration E86.0  Abdominal pain R10.9  Gout attack M10.9  Cellulitis of left foot L03. [address] Hypertension I10  Smoker F17.200  Pancreatitis chronic  Gout M10.9  PAD (peripheral artery disease) ([facility]) I73.9  Alcohol use Z78.9  Hyponatremia E87.1  Cocaine abuse ([facility] F14.10  Alcohol withdrawal ([facility]) F10.239  Malnutrition ([facility] E46  
 Encephalopathy acute G93.40  Foot infection L08.9  Gangrene ([facility] I96  
 Cellulitis L03.90 Subjective: He denies new issue Pain is improved in foot, still has occasional spasms in leg Review of systems: 
 
Constitutional: denies fevers, chills Respiratory: denies SOB, cough Cardiovascular: denies chest pain Gastrointestinal: denies nausea, vomiting, diarrhea Vital signs/Intake and Output: 
Visit Vitals /75 (BP 1 Location: Right arm, BP Patient Position: At rest) Pulse 84 Temp 100 °F (37.8 °C) Resp 18 Ht 5' 4\" (1.626 m) Wt 45.8 kg (100 lb 15.5 oz) SpO2 95% BMI 17.33 kg/m² Current Shift:  No intake/output data recorded. Last three shifts:  02/25 1901 - 02/27 0700 In: 720 [P.O.:720] Out: 1375 [OIQRM:0824] Exam: General: thin debilitated alert, NAD, OX3 
CVS:Regular rate and rhythm, no M/R/G, S1/S2 heard, no thrill Lungs:Clear to auscultation bilaterally, no wheezes, rhonchi, or rales Abdomen: Soft, Nontender, No distention, Normal Bowel sounds, No hepatomegaly Extremities: LLE with wrap on foot Neuro:grossly normal , follows commands Psych:appropriate Dry thickened scaly skin on hands Labs: Results:  
   
Chemistry Recent Labs  
  02/26/19 
7121 02/25/19 
0505 * 101*  138  
K 4.2 4.2  104 CO2 31 29 BUN 19* 12  
CREA 1.23 1.05  
CA 8.5 8.7 AGAP 4 5 BUCR 15 11* CBC w/Diff Recent Labs  
  02/25/19 
0505 WBC 10.4 RBC 3.68* HGB 10.0* HCT 32.6* * Cardiac Enzymes No results for input(s): CPK, CKND1, DOUGLAS in the last 72 hours. No lab exists for component: Sanya Brice Coagulation No results for input(s): PTP, INR, APTT in the last 72 hours. No lab exists for component: INREXT Lipid Panel Lab Results Component Value Date/Time Cholesterol, total 126 02/24/2014 09:52 AM  
 HDL Cholesterol 58 02/24/2014 09:52 AM  
 LDL, calculated 60.6 02/24/2014 09:52 AM  
 VLDL, calculated 7.4 02/24/2014 09:52 AM  
 Triglyceride 37 02/24/2014 09:52 AM  
 CHOL/HDL Ratio 2.2 02/24/2014 09:52 AM  
  
BNP No results for input(s): BNPP in the last 72 hours. Liver Enzymes No results for input(s): TP, ALB, TBIL, AP, SGOT, GPT in the last 72 hours. No lab exists for component: DBIL Thyroid Studies Lab Results Component Value Date/Time TSH 0.84 02/24/2014 09:52 AM  
    
Procedures/imaging: see electronic medical records for all procedures/Xrays and details which were not copied into this note but were reviewed prior to creation of Plan Malena Jackson MD

## 2019-02-28 ENCOUNTER — HOSPITAL ENCOUNTER (OUTPATIENT)
Dept: INFUSION THERAPY | Age: 59
Discharge: HOME | End: 2019-02-28
Payer: MEDICARE

## 2019-02-28 PROCEDURE — 3331090001 HH PPS REVENUE CREDIT

## 2019-02-28 PROCEDURE — 3331090002 HH PPS REVENUE DEBIT

## 2019-03-01 ENCOUNTER — HOSPITAL ENCOUNTER (OUTPATIENT)
Dept: INFUSION THERAPY | Age: 59
End: 2019-03-01
Payer: MEDICARE

## 2019-03-01 PROCEDURE — 3331090001 HH PPS REVENUE CREDIT

## 2019-03-01 PROCEDURE — 3331090002 HH PPS REVENUE DEBIT

## 2019-03-02 ENCOUNTER — APPOINTMENT (OUTPATIENT)
Dept: INFUSION THERAPY | Age: 59
End: 2019-03-02
Payer: MEDICARE

## 2019-03-02 PROCEDURE — 3331090002 HH PPS REVENUE DEBIT

## 2019-03-02 PROCEDURE — 3331090001 HH PPS REVENUE CREDIT

## 2019-03-03 ENCOUNTER — APPOINTMENT (OUTPATIENT)
Dept: INFUSION THERAPY | Age: 59
End: 2019-03-03
Payer: MEDICARE

## 2019-03-03 PROCEDURE — 3331090001 HH PPS REVENUE CREDIT

## 2019-03-03 PROCEDURE — 3331090002 HH PPS REVENUE DEBIT

## 2019-03-04 ENCOUNTER — APPOINTMENT (OUTPATIENT)
Dept: INFUSION THERAPY | Age: 59
End: 2019-03-04
Payer: MEDICARE

## 2019-03-04 PROCEDURE — 3331090002 HH PPS REVENUE DEBIT

## 2019-03-04 PROCEDURE — 3331090001 HH PPS REVENUE CREDIT

## 2019-03-05 ENCOUNTER — APPOINTMENT (OUTPATIENT)
Dept: INFUSION THERAPY | Age: 59
End: 2019-03-05
Payer: MEDICARE

## 2019-03-05 PROCEDURE — 3331090001 HH PPS REVENUE CREDIT

## 2019-03-05 PROCEDURE — 3331090002 HH PPS REVENUE DEBIT

## 2019-03-06 ENCOUNTER — HOSPITAL ENCOUNTER (OUTPATIENT)
Dept: INFUSION THERAPY | Age: 59
End: 2019-03-06
Payer: MEDICARE

## 2019-03-06 PROCEDURE — 3331090001 HH PPS REVENUE CREDIT

## 2019-03-06 PROCEDURE — 3331090002 HH PPS REVENUE DEBIT

## 2019-03-07 ENCOUNTER — APPOINTMENT (OUTPATIENT)
Dept: INFUSION THERAPY | Age: 59
End: 2019-03-07
Payer: MEDICARE

## 2019-03-07 PROCEDURE — 3331090001 HH PPS REVENUE CREDIT

## 2019-03-07 PROCEDURE — 3331090002 HH PPS REVENUE DEBIT

## 2019-03-08 ENCOUNTER — APPOINTMENT (OUTPATIENT)
Dept: INFUSION THERAPY | Age: 59
End: 2019-03-08
Payer: MEDICARE

## 2019-03-08 PROCEDURE — 3331090002 HH PPS REVENUE DEBIT

## 2019-03-08 PROCEDURE — 3331090001 HH PPS REVENUE CREDIT

## 2019-03-09 ENCOUNTER — APPOINTMENT (OUTPATIENT)
Dept: INFUSION THERAPY | Age: 59
End: 2019-03-09
Payer: MEDICARE

## 2019-03-09 PROCEDURE — 3331090001 HH PPS REVENUE CREDIT

## 2019-03-09 PROCEDURE — 3331090002 HH PPS REVENUE DEBIT

## 2019-03-10 ENCOUNTER — APPOINTMENT (OUTPATIENT)
Dept: INFUSION THERAPY | Age: 59
End: 2019-03-10
Payer: MEDICARE

## 2019-03-10 PROCEDURE — 3331090001 HH PPS REVENUE CREDIT

## 2019-03-10 PROCEDURE — 3331090002 HH PPS REVENUE DEBIT

## 2019-03-11 ENCOUNTER — APPOINTMENT (OUTPATIENT)
Dept: INFUSION THERAPY | Age: 59
End: 2019-03-11
Payer: MEDICARE

## 2019-03-11 ENCOUNTER — PATIENT OUTREACH (OUTPATIENT)
Dept: CASE MANAGEMENT | Age: 59
End: 2019-03-11

## 2019-03-11 LAB
BACTERIA SPEC CULT: NORMAL
FUNGUS SMEAR,FNGSMR: NORMAL
SERVICE CMNT-IMP: NORMAL

## 2019-03-11 PROCEDURE — 3331090002 HH PPS REVENUE DEBIT

## 2019-03-11 PROCEDURE — 3331090001 HH PPS REVENUE CREDIT

## 2019-03-11 NOTE — PROGRESS NOTES
Community Care Team Documentation for Patient in [facility]     Patient discharged from [facility] 2/21/2019 - 2/27/2019 to [facility], on 2/27/2019. Hospital Discharge diagnosis:    1. 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 2.  Chronic pancreatitis. 3.  Severe peripheral vascular disease. 4.  Hypertension. 5.  Tobacco use. 6.  History of alcohol use. 7.  Anemia, acute blood loss. 8.  Chronic obstructive pulmonary disease. SNF Attending Provider:      Anticipated discharge date from SNF:        PCP : Roberto Morales MD    Nurse Navigator:     [facility] team rounds completed, updates provided by facility. PT/OT: Participating with therapy. Nausea interfered with therapy, resolved. Lives with wife. [facility]. [facility]. IV ABX x21 days. Medium Risk            13       Total Score        2 . Living with Significant Other. Assisted Living. LTAC. SNF. or   Rehab    4 IP Visits Last 12 Months (1-3=4, 4=9, >4=11)    7 Charlson Comorbidity Score (Age + Comorbid Conditions)        Criteria that do not apply:    Has Seen PCP in Last 6 Months (Yes=3, No=0)    Patient Length of Stay (>5 days = 3)    Pt.  Coverage (Medicare=5 , Medicaid, or Self-Pay=4)      Active Ambulatory Problems     Diagnosis Date Noted    Pancreatitis 05/28/2013    Dehydration 05/28/2013    Abdominal pain 05/28/2013    Gout attack 06/02/2013    Cellulitis of left foot 02/04/2019    Hypertension 02/04/2019    Smoker 02/04/2019    Pancreatitis chronic 02/04/2019    Gout 02/04/2019    PAD (peripheral artery disease) ([address] 02/04/2019    Alcohol use 02/04/2019    Hyponatremia 02/04/2019    Cocaine abuse ([address] 02/05/2019    Alcohol withdrawal ([address] 02/08/2019    Malnutrition ([facility] 02/08/2019    Encephalopathy acute 02/08/2019    Foot infection 02/21/2019    Gangrene ([address] 02/22/2019    Cellulitis 02/22/2019     Resolved Ambulatory Problems     Diagnosis Date Noted    No Resolved Ambulatory Problems     Past Medical History:   Diagnosis Date    Gout     Hypertension     Pancreatitis chronic

## 2019-03-12 ENCOUNTER — APPOINTMENT (OUTPATIENT)
Dept: INFUSION THERAPY | Age: 59
End: 2019-03-12
Payer: MEDICARE

## 2019-03-12 PROCEDURE — 3331090002 HH PPS REVENUE DEBIT

## 2019-03-12 PROCEDURE — 3331090001 HH PPS REVENUE CREDIT

## 2019-03-12 NOTE — OP NOTES
[facility]    Name:  Rebel Ivy  MR#:   [id number]  :  1960  ACCOUNT #:  [de-identified]  DATE OF SERVICE:  2019    PREOPERATIVE DIAGNOSES:  1. Failed internal hardware, left foot. 2.  Osteomyelitis, left foot. POSTOPERATIVE DIAGNOSES:  1. Failed internal hardware, left foot. 2.  Osteomyelitis, left foot. PROCEDURE PERFORMED:  1. Removal of internal hardware, deep implant of the left foot. 2.  Bone biopsy and incision to bone cortex, left foot. SURGEON:  Sergio Candelaria DPM    ASSISTANT:  _____    ANESTHESIA:  MAC.    COMPLICATIONS:  _____    SPECIMENS REMOVED:  _____    IMPLANTS:  _____    ESTIMATED BLOOD LOSS:  _____    INDICATION:  This is an unfortunate 42-year-old male who underwent first metatarsophalangeal joint fusion. He did fairly well for the first 6 to 8 weeks but slowly we have noted increased drainage, discharge, and swelling in the area. He also has a history of peripheral vascular disease. Complications to the procedure include delayed healing, nonhealing, continued pain and discomfort, certainly loss of the toe given possible presence of infection. An informed consent was obtained. PREPARATION AND PROCEDURE:  The patient was taken to the operating room, placed in the operative table in supine position. After induction of IV sedation and administration of intravenous antibiotic, the left foot was prepped and draped in the usual sterile manner. PROCEDURE #1:  Removal of deep implant, left foot. Attention now directed to the dorsal aspect of the left great toe. During after which point, an incision was created 6 cm in length. The incision was  _____ subcutaneous tissue, taken care to ____ as necessary. I removed the plate and screws from the dorsal aspect of the left great toe joint as well as the intramedullary stainless hang from the plantar aspect of the great toe.   There was no evidence of fusion occurring and the bone appeared to be soft in nature but no evidence of purulent abscess. Surgical site was then copiously irrigated with normal sterile solution. PROCEDURE #2:  Incision to bone cortex with bone biopsy. At this point, I incised the periosteum and bone cortex with the use of 15 blade. I noted the bone was soft and friable. I took samples of the bone for bone biopsy and also cultured the wound. Surgical site was then copiously irrigated once again and the periosteum repaired with use of 3-0 Vicryl, subcutaneous tissue repaired with 3-0 Vicryl, and skin closed with use of 3-0 Prolene. The patient's surgical site was then dressed with Xeroform, 4x4s, and Chika, and a very lightly applied Ace bandage dressing. The patient will follow up as an outpatient in my office.       RENE Tejada/[id number]/  D:  03/12/2019 12:51  T:  03/12/2019 17:35  JOB #:  [id number]

## 2019-03-13 ENCOUNTER — APPOINTMENT (OUTPATIENT)
Dept: INFUSION THERAPY | Age: 59
End: 2019-03-13
Payer: MEDICARE

## 2019-03-13 PROCEDURE — 3331090002 HH PPS REVENUE DEBIT

## 2019-03-13 PROCEDURE — 3331090001 HH PPS REVENUE CREDIT

## 2019-03-14 ENCOUNTER — PATIENT OUTREACH (OUTPATIENT)
Dept: CASE MANAGEMENT | Age: 59
End: 2019-03-14

## 2019-03-14 ENCOUNTER — APPOINTMENT (OUTPATIENT)
Dept: INFUSION THERAPY | Age: 59
End: 2019-03-14
Payer: MEDICARE

## 2019-03-14 PROCEDURE — 3331090002 HH PPS REVENUE DEBIT

## 2019-03-14 PROCEDURE — 3331090001 HH PPS REVENUE CREDIT

## 2019-03-14 NOTE — PROGRESS NOTES
Community Care Team Documentation for Patient in [facility]     Patient discharged from [facility] 2/21/2019 - 2/27/2019 to [facility], on 2/27/2019. Hospital Discharge diagnosis:    1. 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 2.  Chronic pancreatitis. 3.  Severe peripheral vascular disease. 4.  Hypertension. 5.  Tobacco use. 6.  History of alcohol use. 7.  Anemia, acute blood loss. 8.  Chronic obstructive pulmonary disease. SNF Attending Provider:      Anticipated discharge date from SNF:        PCP : Philippe Kim MD    Nurse Navigator:     [facility] team rounds completed, updates provided by facility. PT/OT: Supervision level. Amb 100ft, NWB amb 15-20 stairs. Wound care continues. ABX complete 3/21. Dc either 3/18 with 3 days of IV ABX at home or stay. Will depend on cost.   Lives with wife. Home with [facility]. [facility]. IV ABX x21 days. Medium Risk            13       Total Score        2 . Living with Significant Other. Assisted Living. LTAC. SNF. or   Rehab    4 IP Visits Last 12 Months (1-3=4, 4=9, >4=11)    7 Charlson Comorbidity Score (Age + Comorbid Conditions)        Criteria that do not apply:    Has Seen PCP in Last 6 Months (Yes=3, No=0)    Patient Length of Stay (>5 days = 3)    Pt.  Coverage (Medicare=5 , Medicaid, or Self-Pay=4)      Active Ambulatory Problems     Diagnosis Date Noted    Pancreatitis 05/28/2013    Dehydration 05/28/2013    Abdominal pain 05/28/2013    Gout attack 06/02/2013    Cellulitis of left foot 02/04/2019    Hypertension 02/04/2019    Smoker 02/04/2019    Pancreatitis chronic 02/04/2019    Gout 02/04/2019    PAD (peripheral artery disease) ([facility] 02/04/2019    Alcohol use 02/04/2019    Hyponatremia 02/04/2019    Cocaine abuse ([facility] 02/05/2019    Alcohol withdrawal ([address] 02/08/2019    Malnutrition ([facility] 02/08/2019    Encephalopathy acute 02/08/2019  Foot infection 02/21/2019    Gangrene ([facility] [address] 02/22/2019    Cellulitis 02/22/2019     Resolved Ambulatory Problems     Diagnosis Date Noted    No Resolved Ambulatory Problems     Past Medical History:   Diagnosis Date    Gout     Hypertension     Pancreatitis chronic

## 2019-03-15 ENCOUNTER — APPOINTMENT (OUTPATIENT)
Dept: INFUSION THERAPY | Age: 59
End: 2019-03-15
Payer: MEDICARE

## 2019-03-15 PROCEDURE — 3331090001 HH PPS REVENUE CREDIT

## 2019-03-15 PROCEDURE — 3331090002 HH PPS REVENUE DEBIT

## 2019-03-16 ENCOUNTER — APPOINTMENT (OUTPATIENT)
Dept: INFUSION THERAPY | Age: 59
End: 2019-03-16
Payer: MEDICARE

## 2019-03-16 PROCEDURE — 3331090002 HH PPS REVENUE DEBIT

## 2019-03-16 PROCEDURE — 3331090001 HH PPS REVENUE CREDIT

## 2019-03-17 ENCOUNTER — APPOINTMENT (OUTPATIENT)
Dept: INFUSION THERAPY | Age: 59
End: 2019-03-17
Payer: MEDICARE

## 2019-03-17 PROCEDURE — 3331090001 HH PPS REVENUE CREDIT

## 2019-03-17 PROCEDURE — 3331090002 HH PPS REVENUE DEBIT

## 2019-03-18 ENCOUNTER — APPOINTMENT (OUTPATIENT)
Dept: INFUSION THERAPY | Age: 59
End: 2019-03-18
Payer: MEDICARE

## 2019-03-18 PROCEDURE — 3331090001 HH PPS REVENUE CREDIT

## 2019-03-18 PROCEDURE — 3331090002 HH PPS REVENUE DEBIT

## 2019-03-19 ENCOUNTER — APPOINTMENT (OUTPATIENT)
Dept: INFUSION THERAPY | Age: 59
End: 2019-03-19
Payer: MEDICARE

## 2019-03-19 ENCOUNTER — PATIENT OUTREACH (OUTPATIENT)
Dept: CASE MANAGEMENT | Age: 59
End: 2019-03-19

## 2019-03-19 PROCEDURE — 3331090002 HH PPS REVENUE DEBIT

## 2019-03-19 PROCEDURE — 3331090001 HH PPS REVENUE CREDIT

## 2019-03-19 NOTE — PROGRESS NOTES
Community Care Team Documentation for Patient in [facility]     Patient discharged from [facility] 2/21/2019 - 2/27/2019 to [facility], on 2/27/2019. Hospital Discharge diagnosis:    1. 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 2.  Chronic pancreatitis. 3.  Severe peripheral vascular disease. 4.  Hypertension. 5.  Tobacco use. 6.  History of alcohol use. 7.  Anemia, acute blood loss. 8.  Chronic obstructive pulmonary disease. SNF Attending Provider:      Anticipated discharge date from SNF:  3/19/2019      PCP : Jennifer Ventura MD    Nurse Navigator:     [facility] team updates provided by facility via email. \"D/c'd home today with his son. HH - PT/OT/ST/SN/SW/Aide was arranged for him through [address] [place] [address],[facility] and he has a pending appt @ the Wound Care center @ [facility]"    Medium Risk            13       Total Score        2 . Living with Significant Other. Assisted Living. LTAC. SNF. or   Rehab    4 IP Visits Last 12 Months (1-3=4, 4=9, >4=11)    7 Charlson Comorbidity Score (Age + Comorbid Conditions)        Criteria that do not apply:    Has Seen PCP in Last 6 Months (Yes=3, No=0)    Patient Length of Stay (>5 days = 3)    Pt.  Coverage (Medicare=5 , Medicaid, or Self-Pay=4)      Active Ambulatory Problems     Diagnosis Date Noted    Pancreatitis 05/28/2013    Dehydration 05/28/2013    Abdominal pain 05/28/2013    Gout attack 06/02/2013    Cellulitis of left foot 02/04/2019    Hypertension 02/04/2019    Smoker 02/04/2019    Pancreatitis chronic 02/04/2019    Gout 02/04/2019    PAD (peripheral artery disease) ([address] 02/04/2019    Alcohol use 02/04/2019    Hyponatremia 02/04/2019    Cocaine abuse ([address] 02/05/2019    Alcohol withdrawal ([address] 02/08/2019    Malnutrition ([address] 02/08/2019    Encephalopathy acute 02/08/2019    Foot infection 02/21/2019    Gangrene ([address] 02/22/2019    Cellulitis 02/22/2019     Resolved Ambulatory Problems     Diagnosis Date Noted    No Resolved Ambulatory Problems     Past Medical History:   Diagnosis Date    Gout     Hypertension     Pancreatitis chronic

## 2019-03-20 ENCOUNTER — APPOINTMENT (OUTPATIENT)
Dept: INFUSION THERAPY | Age: 59
End: 2019-03-20
Payer: MEDICARE

## 2019-03-20 PROCEDURE — 3331090002 HH PPS REVENUE DEBIT

## 2019-03-20 PROCEDURE — 3331090001 HH PPS REVENUE CREDIT

## 2019-03-21 ENCOUNTER — PATIENT OUTREACH (OUTPATIENT)
Dept: CASE MANAGEMENT | Age: 59
End: 2019-03-21

## 2019-03-21 PROCEDURE — 3331090001 HH PPS REVENUE CREDIT

## 2019-03-21 PROCEDURE — 3331090002 HH PPS REVENUE DEBIT

## 2019-03-21 NOTE — PROGRESS NOTES
Hospital Discharge Follow-Up Date/Time:  3/21/2019 9:50 AM 
 
Patient Outreach made to patient. I spoke with his wife. She states he has an appointment with wound care on 19. They have scheduled the appointment with the pcp. They have not heard from [facility]. She states he switched insurances from [facility] to [facility] on Tuesday. They do not have the new insurance card. She will follow up on that. She think that is holding up everything. He is getting around with a wheelchair. Top Challenges reviewed with the provider  
none Nurse Navigator (NN) contacted the caregiver by telephone to perform post hospital discharge assessment. Verified name and  with caregiver as identifiers. Provided introduction to self, and explanation of the Nurse Navigator role. Reviewed discharge instructions and red flags with caregiver who verbalized understanding. Caregiver given an opportunity to ask questions and does not have any further questions or concerns at this time. The caregiver agrees to contact the PCP office for questions related to their healthcare. NN provided contact information for future reference. Home Health orders at discharge: ST, PT, OT, SN, SW, Personal Care Aide Home Health company: [employer] Date of initial visit: Pending Barriers to care? Advance Care Planning:  
Does patient have an Advance Directive:  not on file Current Outpatient Medications Medication Sig  
 nicotine (NICODERM CQ) 14 mg/24 hr patch 1 Patch by TransDERmal route daily for 30 days.  ammonium lactate (LAC-HYDRIN) 12 % lotion rub in to affected area well  albuterol-ipratropium (DUO-NEB) 2.5 mg-0.5 mg/3 ml nebu 3 mL by Nebulization route every four (4) hours as needed for Cough.  gabapentin (NEURONTIN) 400 mg capsule Take 1 Cap by mouth four (4) times daily.  hydrALAZINE (APRESOLINE) 25 mg tablet Take 1 Tab by mouth three (3) times daily.  atorvastatin (LIPITOR) 20 mg tablet Take 20 mg by mouth daily.  hydrOXYzine HCl (ATARAX) 25 mg tablet Take 25 mg by mouth every eight (8) hours as needed for Anxiety or Itching.  cloNIDine HCl (CATAPRES) 0.1 mg tablet Take 1 Tab by mouth two (2) times a day.  folic acid (FOLVITE) 1 mg tablet Take 1 Tab by mouth daily.  thiamine mononitrate (B-1) 100 mg tablet Take 1 Tab by mouth daily.  colchicine (COLCRYS) 0.6 mg tablet Take 0.6 mg by mouth daily.  famotidine (PEPCID) 20 mg tablet Take 20 mg by mouth daily.  triamcinolone acetonide (KENALOG) 0.1 % topical cream Apply  to affected area. use thin layer  LIPASE/PROTEASE/AMYLASE (CREON PO) Take 1 Tab by mouth three (3) times daily.  lisinopril (PRINIVIL, ZESTRIL) 40 mg tablet Take 40 mg by mouth daily.  amLODIPine (NORVASC) 10 mg tablet Take 1 Tab by mouth daily. No current facility-administered medications for this visit. There are no discontinued medications. [facility] follow up appointment(s): No future appointments. Non-[facility] follow up appointment(s): pcp

## 2019-03-22 PROCEDURE — 3331090001 HH PPS REVENUE CREDIT

## 2019-03-22 PROCEDURE — 3331090002 HH PPS REVENUE DEBIT

## 2019-03-23 PROCEDURE — 3331090001 HH PPS REVENUE CREDIT

## 2019-03-23 PROCEDURE — 3331090002 HH PPS REVENUE DEBIT

## 2019-03-24 LAB
ACID FAST STN SPEC: NEGATIVE
MYCOBACTERIUM SPEC QL CULT: NEGATIVE
SPECIMEN PREPARATION: NORMAL
SPECIMEN SOURCE: NORMAL

## 2019-03-24 PROCEDURE — 3331090001 HH PPS REVENUE CREDIT

## 2019-03-24 PROCEDURE — 3331090002 HH PPS REVENUE DEBIT

## 2019-03-25 PROCEDURE — 3331090001 HH PPS REVENUE CREDIT

## 2019-03-25 PROCEDURE — 3331090002 HH PPS REVENUE DEBIT

## 2019-03-26 PROCEDURE — 3331090001 HH PPS REVENUE CREDIT

## 2019-03-26 PROCEDURE — 3331090002 HH PPS REVENUE DEBIT

## 2019-03-27 PROCEDURE — 3331090001 HH PPS REVENUE CREDIT

## 2019-03-27 PROCEDURE — 3331090002 HH PPS REVENUE DEBIT

## 2019-03-28 PROCEDURE — 3331090001 HH PPS REVENUE CREDIT

## 2019-03-28 PROCEDURE — 3331090002 HH PPS REVENUE DEBIT

## 2019-03-29 PROCEDURE — 3331090001 HH PPS REVENUE CREDIT

## 2019-03-29 PROCEDURE — 3331090002 HH PPS REVENUE DEBIT

## 2019-03-30 PROCEDURE — 3331090001 HH PPS REVENUE CREDIT

## 2019-03-30 PROCEDURE — 3331090002 HH PPS REVENUE DEBIT

## 2019-03-31 PROCEDURE — 3331090001 HH PPS REVENUE CREDIT

## 2019-03-31 PROCEDURE — 3331090002 HH PPS REVENUE DEBIT

## 2019-04-01 ENCOUNTER — HOME CARE VISIT (OUTPATIENT)
Dept: HOME HEALTH SERVICES | Facility: HOME HEALTH | Age: 59
End: 2019-04-01
Payer: MEDICARE

## 2019-04-21 LAB
ATRIAL RATE: 69 BPM
CALCULATED P AXIS, ECG09: 62 DEGREES
CALCULATED R AXIS, ECG10: 33 DEGREES
CALCULATED T AXIS, ECG11: 62 DEGREES
DIAGNOSIS, 93000: NORMAL
P-R INTERVAL, ECG05: 136 MS
Q-T INTERVAL, ECG07: 358 MS
QRS DURATION, ECG06: 96 MS
QTC CALCULATION (BEZET), ECG08: 383 MS
VENTRICULAR RATE, ECG03: 69 BPM

## 2019-05-02 ENCOUNTER — PATIENT OUTREACH (OUTPATIENT)
Dept: INTERNAL MEDICINE CLINIC | Age: 59
End: 2019-05-02

## 2019-05-02 NOTE — PROGRESS NOTES
Patient has graduated from the Transitions of Care Coordination  program on 5/2/19. Patient's symptoms are stable at this time. Patient/family has the ability to self-manage. Care management goals have been completed at this time. No further nurse navigator follow up scheduled. Goals Addressed     None          Pt has nurse navigator's contact information for any further questions, concerns, or needs. Patients upcoming visits:  No future appointments.

## 2023-03-27 NOTE — PROGRESS NOTES
Shift summary: Pt c/o severe L foot pain. PO dilaudid and IV morphine given PRN. PICC line flushed and labs drawn with no issues. L foot incision with dressing CDI. CIWA 0. Hypertensive. Patient Vitals for the past 12 hrs: 
 Temp Pulse Resp BP SpO2  
02/25/19 0335 98.5 °F (36.9 °C) 75 20 156/78 95 % 02/24/19 2254 98.5 °F (36.9 °C) 95 18 (!) 146/93 100 % 02/24/19 2148  99  (!) 185/109 100 % 02/24/19 1910 98.7 °F (37.1 °C) 82 18 (!) 168/106 100 % right upper arm

## (undated) DEVICE — HANDPIECE SET WITH FAN SPRAY TIP: Brand: INTERPULSE

## (undated) DEVICE — SYR LR LCK 1ML GRAD NSAF 30ML --

## (undated) DEVICE — REM POLYHESIVE ADULT PATIENT RETURN ELECTRODE: Brand: VALLEYLAB

## (undated) DEVICE — SPONGE LAP 18X18IN STRL -- 5/PK

## (undated) DEVICE — SUTURE VCRL + SZ 3-0 L36IN ABSRB UD L36MM CT-1 1/2 CIR VCP944H

## (undated) DEVICE — BANDAGE COMPR W4INXL5YD ELAS CLP CLSR

## (undated) DEVICE — INTENDED FOR TISSUE SEPARATION, AND OTHER PROCEDURES THAT REQUIRE A SHARP SURGICAL BLADE TO PUNCTURE OR CUT.: Brand: BARD-PARKER ® CARBON RIB-BACK BLADES

## (undated) DEVICE — ABDOMINAL PAD: Brand: DERMACEA

## (undated) DEVICE — DEVON™ KNEE AND BODY STRAP 60" X 3" (1.5 M X 7.6 CM): Brand: DEVON

## (undated) DEVICE — DRESSING,GAUZE,XEROFORM,CURAD,5"X9",ST: Brand: CURAD

## (undated) DEVICE — BANDAGE,GAUZE,BULKEE II,4.5"X4.1YD,STRL: Brand: MEDLINE

## (undated) DEVICE — DRAPE TWL SURG 16X26IN BLU ORB04] ALLCARE INC]

## (undated) DEVICE — BLADE SAW SAG 73X12.5X1.27 MM FOR LG BNE 2108 SER

## (undated) DEVICE — KENDALL SCD EXPRESS SLEEVES, KNEE LENGTH, MEDIUM: Brand: KENDALL SCD

## (undated) DEVICE — (D)PACK EXTREMITY CUSTOM -- DISC BY MFR USE ITEM 338833

## (undated) DEVICE — SUTURE PROL SZ 3-0 L18IN NONABSORBABLE BLU L19MM PC-5 3/8 8632G

## (undated) DEVICE — SOLUTION LACTATED RINGERS INJECTION USP

## (undated) DEVICE — PRECISION (9.0 X 0.51 X 25.0MM)

## (undated) DEVICE — GOWN,AURORA,NONRNF,XL,30/CS: Brand: MEDLINE

## (undated) DEVICE — SUT VCRL + 2-0 27IN CT2 UD -- 36/BX

## (undated) DEVICE — SOL IRRIGATION INJ NACL 0.9% 500ML BTL

## (undated) DEVICE — Device

## (undated) DEVICE — SUPER SPONGES,MEDIUM: Brand: KERLIX

## (undated) DEVICE — SPONGE GZ W4XL4IN COT 12 PLY TYP VII WVN C FLD DSGN

## (undated) DEVICE — PACK PROCEDURE SURG EXTREMITY CUST